# Patient Record
Sex: MALE | Race: OTHER | NOT HISPANIC OR LATINO | URBAN - METROPOLITAN AREA
[De-identification: names, ages, dates, MRNs, and addresses within clinical notes are randomized per-mention and may not be internally consistent; named-entity substitution may affect disease eponyms.]

---

## 2024-01-01 ENCOUNTER — INPATIENT (INPATIENT)
Facility: HOSPITAL | Age: 79
LOS: 19 days | DRG: 392 | End: 2024-10-06
Attending: HOSPITALIST | Admitting: STUDENT IN AN ORGANIZED HEALTH CARE EDUCATION/TRAINING PROGRAM
Payer: MEDICARE

## 2024-01-01 VITALS
TEMPERATURE: 98 F | SYSTOLIC BLOOD PRESSURE: 92 MMHG | WEIGHT: 126.99 LBS | RESPIRATION RATE: 18 BRPM | HEART RATE: 93 BPM | DIASTOLIC BLOOD PRESSURE: 55 MMHG | OXYGEN SATURATION: 96 %

## 2024-01-01 VITALS — OXYGEN SATURATION: 97 % | HEART RATE: 62 BPM

## 2024-01-01 DIAGNOSIS — Z51.5 ENCOUNTER FOR PALLIATIVE CARE: ICD-10-CM

## 2024-01-01 DIAGNOSIS — R10.9 UNSPECIFIED ABDOMINAL PAIN: ICD-10-CM

## 2024-01-01 DIAGNOSIS — C67.9 MALIGNANT NEOPLASM OF BLADDER, UNSPECIFIED: ICD-10-CM

## 2024-01-01 DIAGNOSIS — R31.9 HEMATURIA, UNSPECIFIED: ICD-10-CM

## 2024-01-01 LAB
A1C WITH ESTIMATED AVERAGE GLUCOSE RESULT: 6.3 % — HIGH (ref 4–5.6)
AFP-TM SERPL-MCNC: 2.4 NG/ML — SIGNIFICANT CHANGE UP
ALBUMIN SERPL ELPH-MCNC: 2.2 G/DL — LOW (ref 3.5–5.2)
ALBUMIN SERPL ELPH-MCNC: 2.3 G/DL — LOW (ref 3.5–5.2)
ALBUMIN SERPL ELPH-MCNC: 2.5 G/DL — LOW (ref 3.5–5.2)
ALBUMIN SERPL ELPH-MCNC: 2.6 G/DL — LOW (ref 3.5–5.2)
ALBUMIN SERPL ELPH-MCNC: 2.6 G/DL — LOW (ref 3.5–5.2)
ALBUMIN SERPL ELPH-MCNC: 2.8 G/DL — LOW (ref 3.5–5.2)
ALBUMIN SERPL ELPH-MCNC: 2.9 G/DL — LOW (ref 3.5–5.2)
ALBUMIN SERPL ELPH-MCNC: 3 G/DL — LOW (ref 3.5–5.2)
ALBUMIN SERPL ELPH-MCNC: 3 G/DL — LOW (ref 3.5–5.2)
ALBUMIN SERPL ELPH-MCNC: 3.1 G/DL — LOW (ref 3.5–5.2)
ALBUMIN SERPL ELPH-MCNC: 3.3 G/DL — LOW (ref 3.5–5.2)
ALBUMIN SERPL ELPH-MCNC: 3.4 G/DL — LOW (ref 3.5–5.2)
ALBUMIN SERPL ELPH-MCNC: 3.4 G/DL — LOW (ref 3.5–5.2)
ALBUMIN SERPL ELPH-MCNC: 3.7 G/DL — SIGNIFICANT CHANGE UP (ref 3.5–5.2)
ALBUMIN SERPL ELPH-MCNC: 3.8 G/DL — SIGNIFICANT CHANGE UP (ref 3.5–5.2)
ALP SERPL-CCNC: 485 U/L — HIGH (ref 30–115)
ALP SERPL-CCNC: 485 U/L — HIGH (ref 30–115)
ALP SERPL-CCNC: 542 U/L — HIGH (ref 30–115)
ALP SERPL-CCNC: 596 U/L — HIGH (ref 30–115)
ALP SERPL-CCNC: 655 U/L — HIGH (ref 30–115)
ALP SERPL-CCNC: 812 U/L — HIGH (ref 30–115)
ALP SERPL-CCNC: 850 U/L — HIGH (ref 30–115)
ALP SERPL-CCNC: 853 U/L — HIGH (ref 30–115)
ALP SERPL-CCNC: 856 U/L — HIGH (ref 30–115)
ALP SERPL-CCNC: 860 U/L — HIGH (ref 30–115)
ALP SERPL-CCNC: 865 U/L — HIGH (ref 30–115)
ALP SERPL-CCNC: 869 U/L — HIGH (ref 30–115)
ALP SERPL-CCNC: 874 U/L — HIGH (ref 30–115)
ALP SERPL-CCNC: 889 U/L — HIGH (ref 30–115)
ALP SERPL-CCNC: 897 U/L — HIGH (ref 30–115)
ALP SERPL-CCNC: 899 U/L — HIGH (ref 30–115)
ALP SERPL-CCNC: 908 U/L — HIGH (ref 30–115)
ALP SERPL-CCNC: 947 U/L — HIGH (ref 30–115)
ALP SERPL-CCNC: 969 U/L — HIGH (ref 30–115)
ALP SERPL-CCNC: 981 U/L — HIGH (ref 30–115)
ALT FLD-CCNC: 60 U/L — HIGH (ref 0–41)
ALT FLD-CCNC: 63 U/L — HIGH (ref 0–41)
ALT FLD-CCNC: 64 U/L — HIGH (ref 0–41)
ALT FLD-CCNC: 65 U/L — HIGH (ref 0–41)
ALT FLD-CCNC: 65 U/L — HIGH (ref 0–41)
ALT FLD-CCNC: 69 U/L — HIGH (ref 0–41)
ALT FLD-CCNC: 69 U/L — HIGH (ref 0–41)
ALT FLD-CCNC: 70 U/L — HIGH (ref 0–41)
ALT FLD-CCNC: 70 U/L — HIGH (ref 0–41)
ALT FLD-CCNC: 72 U/L — HIGH (ref 0–41)
ALT FLD-CCNC: 81 U/L — HIGH (ref 0–41)
ALT FLD-CCNC: 81 U/L — HIGH (ref 0–41)
ALT FLD-CCNC: 84 U/L — HIGH (ref 0–41)
ALT FLD-CCNC: 85 U/L — HIGH (ref 0–41)
ALT FLD-CCNC: 86 U/L — HIGH (ref 0–41)
ALT FLD-CCNC: 89 U/L — HIGH (ref 0–41)
ALT FLD-CCNC: 91 U/L — HIGH (ref 0–41)
ALT FLD-CCNC: 91 U/L — HIGH (ref 0–41)
ALT FLD-CCNC: 92 U/L — HIGH (ref 0–41)
ALT FLD-CCNC: 98 U/L — HIGH (ref 0–41)
ANA TITR SER: NEGATIVE — SIGNIFICANT CHANGE UP
ANION GAP SERPL CALC-SCNC: 14 MMOL/L — SIGNIFICANT CHANGE UP (ref 7–14)
ANION GAP SERPL CALC-SCNC: 15 MMOL/L — HIGH (ref 7–14)
ANION GAP SERPL CALC-SCNC: 16 MMOL/L — HIGH (ref 7–14)
ANION GAP SERPL CALC-SCNC: 16 MMOL/L — HIGH (ref 7–14)
ANION GAP SERPL CALC-SCNC: 17 MMOL/L — HIGH (ref 7–14)
ANION GAP SERPL CALC-SCNC: 18 MMOL/L — HIGH (ref 7–14)
ANION GAP SERPL CALC-SCNC: 19 MMOL/L — HIGH (ref 7–14)
ANION GAP SERPL CALC-SCNC: 21 MMOL/L — HIGH (ref 7–14)
ANION GAP SERPL CALC-SCNC: 21 MMOL/L — HIGH (ref 7–14)
ANION GAP SERPL CALC-SCNC: 22 MMOL/L — HIGH (ref 7–14)
ANION GAP SERPL CALC-SCNC: 27 MMOL/L — HIGH (ref 7–14)
APPEARANCE UR: ABNORMAL
APTT BLD: 29.9 SEC — SIGNIFICANT CHANGE UP (ref 27–39.2)
APTT BLD: 30.6 SEC — SIGNIFICANT CHANGE UP (ref 27–39.2)
APTT BLD: 31.9 SEC — SIGNIFICANT CHANGE UP (ref 27–39.2)
AST SERPL-CCNC: 245 U/L — HIGH (ref 0–41)
AST SERPL-CCNC: 252 U/L — HIGH (ref 0–41)
AST SERPL-CCNC: 252 U/L — HIGH (ref 0–41)
AST SERPL-CCNC: 259 U/L — HIGH (ref 0–41)
AST SERPL-CCNC: 260 U/L — HIGH (ref 0–41)
AST SERPL-CCNC: 270 U/L — HIGH (ref 0–41)
AST SERPL-CCNC: 278 U/L — HIGH (ref 0–41)
AST SERPL-CCNC: 283 U/L — HIGH (ref 0–41)
AST SERPL-CCNC: 289 U/L — HIGH (ref 0–41)
AST SERPL-CCNC: 292 U/L — HIGH (ref 0–41)
AST SERPL-CCNC: 294 U/L — HIGH (ref 0–41)
AST SERPL-CCNC: 294 U/L — HIGH (ref 0–41)
AST SERPL-CCNC: 301 U/L — HIGH (ref 0–41)
AST SERPL-CCNC: 304 U/L — HIGH (ref 0–41)
AST SERPL-CCNC: 323 U/L — HIGH (ref 0–41)
AST SERPL-CCNC: 331 U/L — HIGH (ref 0–41)
AST SERPL-CCNC: 335 U/L — HIGH (ref 0–41)
AST SERPL-CCNC: 339 U/L — HIGH (ref 0–41)
AST SERPL-CCNC: 412 U/L — HIGH (ref 0–41)
AST SERPL-CCNC: 414 U/L — HIGH (ref 0–41)
BACTERIA # UR AUTO: ABNORMAL /HPF
BACTERIA # UR AUTO: ABNORMAL /HPF
BASE EXCESS BLDA CALC-SCNC: -8.7 MMOL/L — LOW (ref -2–3)
BASOPHILS # BLD AUTO: 0 K/UL — SIGNIFICANT CHANGE UP (ref 0–0.2)
BASOPHILS # BLD AUTO: 0 K/UL — SIGNIFICANT CHANGE UP (ref 0–0.2)
BASOPHILS # BLD AUTO: 0.01 K/UL — SIGNIFICANT CHANGE UP (ref 0–0.2)
BASOPHILS # BLD AUTO: 0.02 K/UL — SIGNIFICANT CHANGE UP (ref 0–0.2)
BASOPHILS # BLD AUTO: 0.03 K/UL — SIGNIFICANT CHANGE UP (ref 0–0.2)
BASOPHILS # BLD AUTO: 0.04 K/UL — SIGNIFICANT CHANGE UP (ref 0–0.2)
BASOPHILS NFR BLD AUTO: 0 % — SIGNIFICANT CHANGE UP (ref 0–1)
BASOPHILS NFR BLD AUTO: 0 % — SIGNIFICANT CHANGE UP (ref 0–1)
BASOPHILS NFR BLD AUTO: 0.1 % — SIGNIFICANT CHANGE UP (ref 0–1)
BASOPHILS NFR BLD AUTO: 0.2 % — SIGNIFICANT CHANGE UP (ref 0–1)
BASOPHILS NFR BLD AUTO: 0.3 % — SIGNIFICANT CHANGE UP (ref 0–1)
BASOPHILS NFR BLD AUTO: 0.4 % — SIGNIFICANT CHANGE UP (ref 0–1)
BILIRUB SERPL-MCNC: 2.1 MG/DL — HIGH (ref 0.2–1.2)
BILIRUB SERPL-MCNC: 2.6 MG/DL — HIGH (ref 0.2–1.2)
BILIRUB SERPL-MCNC: 3.1 MG/DL — HIGH (ref 0.2–1.2)
BILIRUB SERPL-MCNC: 3.8 MG/DL — HIGH (ref 0.2–1.2)
BILIRUB SERPL-MCNC: 3.8 MG/DL — HIGH (ref 0.2–1.2)
BILIRUB SERPL-MCNC: 4 MG/DL — HIGH (ref 0.2–1.2)
BILIRUB SERPL-MCNC: 4.1 MG/DL — HIGH (ref 0.2–1.2)
BILIRUB SERPL-MCNC: 4.1 MG/DL — HIGH (ref 0.2–1.2)
BILIRUB SERPL-MCNC: 4.2 MG/DL — HIGH (ref 0.2–1.2)
BILIRUB SERPL-MCNC: 4.3 MG/DL — HIGH (ref 0.2–1.2)
BILIRUB SERPL-MCNC: 5.5 MG/DL — HIGH (ref 0.2–1.2)
BILIRUB SERPL-MCNC: 5.5 MG/DL — HIGH (ref 0.2–1.2)
BILIRUB SERPL-MCNC: 5.8 MG/DL — HIGH (ref 0.2–1.2)
BILIRUB SERPL-MCNC: 6.6 MG/DL — HIGH (ref 0.2–1.2)
BILIRUB SERPL-MCNC: 6.7 MG/DL — HIGH (ref 0.2–1.2)
BILIRUB SERPL-MCNC: 7 MG/DL — HIGH (ref 0.2–1.2)
BILIRUB SERPL-MCNC: 7.5 MG/DL — HIGH (ref 0.2–1.2)
BILIRUB SERPL-MCNC: 8.1 MG/DL — HIGH (ref 0.2–1.2)
BILIRUB SERPL-MCNC: 8.2 MG/DL — HIGH (ref 0.2–1.2)
BILIRUB SERPL-MCNC: 8.4 MG/DL — HIGH (ref 0.2–1.2)
BILIRUB UR-MCNC: ABNORMAL
BLD GP AB SCN SERPL QL: SIGNIFICANT CHANGE UP
BUN SERPL-MCNC: 17 MG/DL — SIGNIFICANT CHANGE UP (ref 10–20)
BUN SERPL-MCNC: 19 MG/DL — SIGNIFICANT CHANGE UP (ref 10–20)
BUN SERPL-MCNC: 21 MG/DL — HIGH (ref 10–20)
BUN SERPL-MCNC: 23 MG/DL — HIGH (ref 10–20)
BUN SERPL-MCNC: 24 MG/DL — HIGH (ref 10–20)
BUN SERPL-MCNC: 24 MG/DL — HIGH (ref 10–20)
BUN SERPL-MCNC: 26 MG/DL — HIGH (ref 10–20)
BUN SERPL-MCNC: 26 MG/DL — HIGH (ref 10–20)
BUN SERPL-MCNC: 27 MG/DL — HIGH (ref 10–20)
BUN SERPL-MCNC: 27 MG/DL — HIGH (ref 10–20)
BUN SERPL-MCNC: 28 MG/DL — HIGH (ref 10–20)
BUN SERPL-MCNC: 29 MG/DL — HIGH (ref 10–20)
BUN SERPL-MCNC: 30 MG/DL — HIGH (ref 10–20)
BUN SERPL-MCNC: 30 MG/DL — HIGH (ref 10–20)
BUN SERPL-MCNC: 31 MG/DL — HIGH (ref 10–20)
BUN SERPL-MCNC: 31 MG/DL — HIGH (ref 10–20)
BUN SERPL-MCNC: 32 MG/DL — HIGH (ref 10–20)
BUN SERPL-MCNC: 32 MG/DL — HIGH (ref 10–20)
BUN SERPL-MCNC: 43 MG/DL — HIGH (ref 10–20)
BUN SERPL-MCNC: 44 MG/DL — HIGH (ref 10–20)
BUN SERPL-MCNC: 44 MG/DL — HIGH (ref 10–20)
BUN SERPL-MCNC: 48 MG/DL — HIGH (ref 10–20)
BUN SERPL-MCNC: 53 MG/DL — HIGH (ref 10–20)
C DIFF GDH STL QL: NEGATIVE — SIGNIFICANT CHANGE UP
C DIFF GDH STL QL: SIGNIFICANT CHANGE UP
CALCIUM SERPL-MCNC: 10.2 MG/DL — SIGNIFICANT CHANGE UP (ref 8.4–10.5)
CALCIUM SERPL-MCNC: 11.4 MG/DL — HIGH (ref 8.4–10.5)
CALCIUM SERPL-MCNC: 7.6 MG/DL — LOW (ref 8.4–10.5)
CALCIUM SERPL-MCNC: 8 MG/DL — LOW (ref 8.4–10.5)
CALCIUM SERPL-MCNC: 8.1 MG/DL — LOW (ref 8.4–10.5)
CALCIUM SERPL-MCNC: 8.2 MG/DL — LOW (ref 8.4–10.5)
CALCIUM SERPL-MCNC: 8.4 MG/DL — SIGNIFICANT CHANGE UP (ref 8.4–10.4)
CALCIUM SERPL-MCNC: 8.4 MG/DL — SIGNIFICANT CHANGE UP (ref 8.4–10.5)
CALCIUM SERPL-MCNC: 8.4 MG/DL — SIGNIFICANT CHANGE UP (ref 8.4–10.5)
CALCIUM SERPL-MCNC: 8.6 MG/DL — SIGNIFICANT CHANGE UP (ref 8.4–10.4)
CALCIUM SERPL-MCNC: 8.6 MG/DL — SIGNIFICANT CHANGE UP (ref 8.4–10.5)
CALCIUM SERPL-MCNC: 9.3 MG/DL — SIGNIFICANT CHANGE UP (ref 8.4–10.4)
CALCIUM SERPL-MCNC: 9.3 MG/DL — SIGNIFICANT CHANGE UP (ref 8.4–10.5)
CALCIUM SERPL-MCNC: 9.4 MG/DL — SIGNIFICANT CHANGE UP (ref 8.4–10.4)
CALCIUM SERPL-MCNC: 9.4 MG/DL — SIGNIFICANT CHANGE UP (ref 8.4–10.4)
CALCIUM SERPL-MCNC: 9.4 MG/DL — SIGNIFICANT CHANGE UP (ref 8.4–10.5)
CALCIUM SERPL-MCNC: 9.5 MG/DL — SIGNIFICANT CHANGE UP (ref 8.4–10.5)
CALCIUM SERPL-MCNC: 9.6 MG/DL — SIGNIFICANT CHANGE UP (ref 8.4–10.4)
CALCIUM SERPL-MCNC: 9.6 MG/DL — SIGNIFICANT CHANGE UP (ref 8.4–10.5)
CALCIUM SERPL-MCNC: 9.8 MG/DL — SIGNIFICANT CHANGE UP (ref 8.4–10.5)
CALCIUM SERPL-MCNC: 9.9 MG/DL — SIGNIFICANT CHANGE UP (ref 8.4–10.5)
CALPROTECTIN STL-MCNT: 42 UG/G — SIGNIFICANT CHANGE UP (ref 0–120)
CANCER AG19-9 SERPL-ACNC: 49 U/ML — HIGH
CAST: 40 /LPF — HIGH (ref 0–4)
CAST: 9 /LPF — HIGH (ref 0–4)
CHLORIDE SERPL-SCNC: 100 MMOL/L — SIGNIFICANT CHANGE UP (ref 98–110)
CHLORIDE SERPL-SCNC: 100 MMOL/L — SIGNIFICANT CHANGE UP (ref 98–110)
CHLORIDE SERPL-SCNC: 102 MMOL/L — SIGNIFICANT CHANGE UP (ref 98–110)
CHLORIDE SERPL-SCNC: 103 MMOL/L — SIGNIFICANT CHANGE UP (ref 98–110)
CHLORIDE SERPL-SCNC: 104 MMOL/L — SIGNIFICANT CHANGE UP (ref 98–110)
CHLORIDE SERPL-SCNC: 105 MMOL/L — SIGNIFICANT CHANGE UP (ref 98–110)
CHLORIDE SERPL-SCNC: 105 MMOL/L — SIGNIFICANT CHANGE UP (ref 98–110)
CHLORIDE SERPL-SCNC: 106 MMOL/L — SIGNIFICANT CHANGE UP (ref 98–110)
CHLORIDE SERPL-SCNC: 107 MMOL/L — SIGNIFICANT CHANGE UP (ref 98–110)
CHLORIDE SERPL-SCNC: 107 MMOL/L — SIGNIFICANT CHANGE UP (ref 98–110)
CHLORIDE SERPL-SCNC: 92 MMOL/L — LOW (ref 98–110)
CHLORIDE SERPL-SCNC: 95 MMOL/L — LOW (ref 98–110)
CHLORIDE SERPL-SCNC: 96 MMOL/L — LOW (ref 98–110)
CHLORIDE SERPL-SCNC: 97 MMOL/L — LOW (ref 98–110)
CHLORIDE SERPL-SCNC: 97 MMOL/L — LOW (ref 98–110)
CHLORIDE SERPL-SCNC: 98 MMOL/L — SIGNIFICANT CHANGE UP (ref 98–110)
CHLORIDE SERPL-SCNC: 98 MMOL/L — SIGNIFICANT CHANGE UP (ref 98–110)
CHLORIDE SERPL-SCNC: 99 MMOL/L — SIGNIFICANT CHANGE UP (ref 98–110)
CHOLEST SERPL-MCNC: 211 MG/DL — HIGH
CO2 SERPL-SCNC: 16 MMOL/L — LOW (ref 17–32)
CO2 SERPL-SCNC: 17 MMOL/L — SIGNIFICANT CHANGE UP (ref 17–32)
CO2 SERPL-SCNC: 18 MMOL/L — SIGNIFICANT CHANGE UP (ref 17–32)
CO2 SERPL-SCNC: 19 MMOL/L — SIGNIFICANT CHANGE UP (ref 17–32)
CO2 SERPL-SCNC: 20 MMOL/L — SIGNIFICANT CHANGE UP (ref 17–32)
CO2 SERPL-SCNC: 21 MMOL/L — SIGNIFICANT CHANGE UP (ref 17–32)
CO2 SERPL-SCNC: 21 MMOL/L — SIGNIFICANT CHANGE UP (ref 17–32)
CO2 SERPL-SCNC: 22 MMOL/L — SIGNIFICANT CHANGE UP (ref 17–32)
CO2 SERPL-SCNC: 23 MMOL/L — SIGNIFICANT CHANGE UP (ref 17–32)
CO2 SERPL-SCNC: 23 MMOL/L — SIGNIFICANT CHANGE UP (ref 17–32)
CO2 SERPL-SCNC: 24 MMOL/L — SIGNIFICANT CHANGE UP (ref 17–32)
CO2 SERPL-SCNC: 25 MMOL/L — SIGNIFICANT CHANGE UP (ref 17–32)
CO2 SERPL-SCNC: 25 MMOL/L — SIGNIFICANT CHANGE UP (ref 17–32)
COLOR SPEC: ABNORMAL
COLOR SPEC: SIGNIFICANT CHANGE UP
COLOR SPEC: SIGNIFICANT CHANGE UP
CREAT SERPL-MCNC: 0.7 MG/DL — SIGNIFICANT CHANGE UP (ref 0.7–1.5)
CREAT SERPL-MCNC: 0.7 MG/DL — SIGNIFICANT CHANGE UP (ref 0.7–1.5)
CREAT SERPL-MCNC: 0.8 MG/DL — SIGNIFICANT CHANGE UP (ref 0.7–1.5)
CREAT SERPL-MCNC: 0.9 MG/DL — SIGNIFICANT CHANGE UP (ref 0.7–1.5)
CREAT SERPL-MCNC: 1 MG/DL — SIGNIFICANT CHANGE UP (ref 0.7–1.5)
CREAT SERPL-MCNC: 1.1 MG/DL — SIGNIFICANT CHANGE UP (ref 0.7–1.5)
CREAT SERPL-MCNC: 1.1 MG/DL — SIGNIFICANT CHANGE UP (ref 0.7–1.5)
CREAT SERPL-MCNC: 1.2 MG/DL — SIGNIFICANT CHANGE UP (ref 0.7–1.5)
CREAT SERPL-MCNC: 1.3 MG/DL — SIGNIFICANT CHANGE UP (ref 0.7–1.5)
CREAT SERPL-MCNC: 1.9 MG/DL — HIGH (ref 0.7–1.5)
CREAT SERPL-MCNC: 2.2 MG/DL — HIGH (ref 0.7–1.5)
CREAT SERPL-MCNC: 2.4 MG/DL — HIGH (ref 0.7–1.5)
CREAT SERPL-MCNC: 2.4 MG/DL — HIGH (ref 0.7–1.5)
CULTURE RESULTS: SIGNIFICANT CHANGE UP
D DIMER BLD IA.RAPID-MCNC: 1119 NG/ML DDU — HIGH
DIFF PNL FLD: ABNORMAL
EGFR: 27 ML/MIN/1.73M2 — LOW
EGFR: 27 ML/MIN/1.73M2 — LOW
EGFR: 30 ML/MIN/1.73M2 — LOW
EGFR: 36 ML/MIN/1.73M2 — LOW
EGFR: 57 ML/MIN/1.73M2 — LOW
EGFR: 62 ML/MIN/1.73M2 — SIGNIFICANT CHANGE UP
EGFR: 69 ML/MIN/1.73M2 — SIGNIFICANT CHANGE UP
EGFR: 69 ML/MIN/1.73M2 — SIGNIFICANT CHANGE UP
EGFR: 77 ML/MIN/1.73M2 — SIGNIFICANT CHANGE UP
EGFR: 87 ML/MIN/1.73M2 — SIGNIFICANT CHANGE UP
EGFR: 91 ML/MIN/1.73M2 — SIGNIFICANT CHANGE UP
EGFR: 94 ML/MIN/1.73M2 — SIGNIFICANT CHANGE UP
EGFR: 94 ML/MIN/1.73M2 — SIGNIFICANT CHANGE UP
ELASTASE PANC STL-MCNT: 157 CD:794062645 — LOW
EOSINOPHIL # BLD AUTO: 0 K/UL — SIGNIFICANT CHANGE UP (ref 0–0.7)
EOSINOPHIL # BLD AUTO: 0.01 K/UL — SIGNIFICANT CHANGE UP (ref 0–0.7)
EOSINOPHIL # BLD AUTO: 0.02 K/UL — SIGNIFICANT CHANGE UP (ref 0–0.7)
EOSINOPHIL # BLD AUTO: 0.03 K/UL — SIGNIFICANT CHANGE UP (ref 0–0.7)
EOSINOPHIL # BLD AUTO: 0.04 K/UL — SIGNIFICANT CHANGE UP (ref 0–0.7)
EOSINOPHIL # BLD AUTO: 0.05 K/UL — SIGNIFICANT CHANGE UP (ref 0–0.7)
EOSINOPHIL # BLD AUTO: 0.09 K/UL — SIGNIFICANT CHANGE UP (ref 0–0.7)
EOSINOPHIL NFR BLD AUTO: 0 % — SIGNIFICANT CHANGE UP (ref 0–8)
EOSINOPHIL NFR BLD AUTO: 0.1 % — SIGNIFICANT CHANGE UP (ref 0–8)
EOSINOPHIL NFR BLD AUTO: 0.3 % — SIGNIFICANT CHANGE UP (ref 0–8)
EOSINOPHIL NFR BLD AUTO: 0.4 % — SIGNIFICANT CHANGE UP (ref 0–8)
EOSINOPHIL NFR BLD AUTO: 0.8 % — SIGNIFICANT CHANGE UP (ref 0–8)
ESTIMATED AVERAGE GLUCOSE: 134 MG/DL — HIGH (ref 68–114)
FAT STL QN: NORMAL — SIGNIFICANT CHANGE UP
FAT STL QN: NORMAL — SIGNIFICANT CHANGE UP
GASTRIN SERPL-MCNC: 53 PG/ML — SIGNIFICANT CHANGE UP (ref 0–115)
GI PCR PANEL: SIGNIFICANT CHANGE UP
GLUCOSE BLDC GLUCOMTR-MCNC: 239 MG/DL — HIGH (ref 70–99)
GLUCOSE SERPL-MCNC: 101 MG/DL — HIGH (ref 70–99)
GLUCOSE SERPL-MCNC: 102 MG/DL — HIGH (ref 70–99)
GLUCOSE SERPL-MCNC: 107 MG/DL — HIGH (ref 70–99)
GLUCOSE SERPL-MCNC: 108 MG/DL — HIGH (ref 70–99)
GLUCOSE SERPL-MCNC: 110 MG/DL — HIGH (ref 70–99)
GLUCOSE SERPL-MCNC: 122 MG/DL — HIGH (ref 70–99)
GLUCOSE SERPL-MCNC: 122 MG/DL — HIGH (ref 70–99)
GLUCOSE SERPL-MCNC: 132 MG/DL — HIGH (ref 70–99)
GLUCOSE SERPL-MCNC: 137 MG/DL — HIGH (ref 70–99)
GLUCOSE SERPL-MCNC: 142 MG/DL — HIGH (ref 70–99)
GLUCOSE SERPL-MCNC: 149 MG/DL — HIGH (ref 70–99)
GLUCOSE SERPL-MCNC: 207 MG/DL — HIGH (ref 70–99)
GLUCOSE SERPL-MCNC: 59 MG/DL — LOW (ref 70–99)
GLUCOSE SERPL-MCNC: 76 MG/DL — SIGNIFICANT CHANGE UP (ref 70–99)
GLUCOSE SERPL-MCNC: 78 MG/DL — SIGNIFICANT CHANGE UP (ref 70–99)
GLUCOSE SERPL-MCNC: 78 MG/DL — SIGNIFICANT CHANGE UP (ref 70–99)
GLUCOSE SERPL-MCNC: 79 MG/DL — SIGNIFICANT CHANGE UP (ref 70–99)
GLUCOSE SERPL-MCNC: 80 MG/DL — SIGNIFICANT CHANGE UP (ref 70–99)
GLUCOSE SERPL-MCNC: 84 MG/DL — SIGNIFICANT CHANGE UP (ref 70–99)
GLUCOSE SERPL-MCNC: 84 MG/DL — SIGNIFICANT CHANGE UP (ref 70–99)
GLUCOSE SERPL-MCNC: 85 MG/DL — SIGNIFICANT CHANGE UP (ref 70–99)
GLUCOSE SERPL-MCNC: 86 MG/DL — SIGNIFICANT CHANGE UP (ref 70–99)
GLUCOSE SERPL-MCNC: 87 MG/DL — SIGNIFICANT CHANGE UP (ref 70–99)
GLUCOSE UR QL: NEGATIVE MG/DL — SIGNIFICANT CHANGE UP
HAV IGM SER-ACNC: SIGNIFICANT CHANGE UP
HBV CORE IGM SER-ACNC: SIGNIFICANT CHANGE UP
HBV SURFACE AG SER-ACNC: SIGNIFICANT CHANGE UP
HCO3 BLDA-SCNC: 21 MMOL/L — SIGNIFICANT CHANGE UP (ref 21–28)
HCT VFR BLD CALC: 22.5 % — LOW (ref 42–52)
HCT VFR BLD CALC: 25.3 % — LOW (ref 42–52)
HCT VFR BLD CALC: 26.2 % — LOW (ref 42–52)
HCT VFR BLD CALC: 26.3 % — LOW (ref 42–52)
HCT VFR BLD CALC: 26.4 % — LOW (ref 42–52)
HCT VFR BLD CALC: 26.7 % — LOW (ref 42–52)
HCT VFR BLD CALC: 27.9 % — LOW (ref 42–52)
HCT VFR BLD CALC: 28 % — LOW (ref 42–52)
HCT VFR BLD CALC: 28.5 % — LOW (ref 42–52)
HCT VFR BLD CALC: 29.4 % — LOW (ref 42–52)
HCT VFR BLD CALC: 29.4 % — LOW (ref 42–52)
HCT VFR BLD CALC: 29.9 % — LOW (ref 42–52)
HCT VFR BLD CALC: 31.1 % — LOW (ref 42–52)
HCT VFR BLD CALC: 31.4 % — LOW (ref 42–52)
HCT VFR BLD CALC: 31.9 % — LOW (ref 42–52)
HCT VFR BLD CALC: 32.4 % — LOW (ref 42–52)
HCT VFR BLD CALC: 33.3 % — LOW (ref 42–52)
HCT VFR BLD CALC: 34 % — LOW (ref 42–52)
HCT VFR BLD CALC: 34.3 % — LOW (ref 42–52)
HCT VFR BLD CALC: 35.3 % — LOW (ref 42–52)
HCT VFR BLD CALC: 35.4 % — LOW (ref 42–52)
HCT VFR BLD CALC: 35.7 % — LOW (ref 42–52)
HCT VFR BLD CALC: 39.5 % — LOW (ref 42–52)
HCV AB S/CO SERPL IA: 0.08 S/CO — SIGNIFICANT CHANGE UP (ref 0–0.99)
HCV AB SERPL-IMP: SIGNIFICANT CHANGE UP
HDLC SERPL-MCNC: 42 MG/DL — SIGNIFICANT CHANGE UP
HGB BLD-MCNC: 10 G/DL — LOW (ref 14–18)
HGB BLD-MCNC: 10.1 G/DL — LOW (ref 14–18)
HGB BLD-MCNC: 10.5 G/DL — LOW (ref 14–18)
HGB BLD-MCNC: 10.5 G/DL — LOW (ref 14–18)
HGB BLD-MCNC: 10.6 G/DL — LOW (ref 14–18)
HGB BLD-MCNC: 11.2 G/DL — LOW (ref 14–18)
HGB BLD-MCNC: 11.2 G/DL — LOW (ref 14–18)
HGB BLD-MCNC: 11.4 G/DL — LOW (ref 14–18)
HGB BLD-MCNC: 11.7 G/DL — LOW (ref 14–18)
HGB BLD-MCNC: 11.9 G/DL — LOW (ref 14–18)
HGB BLD-MCNC: 12.2 G/DL — LOW (ref 14–18)
HGB BLD-MCNC: 12.3 G/DL — LOW (ref 14–18)
HGB BLD-MCNC: 12.3 G/DL — LOW (ref 14–18)
HGB BLD-MCNC: 12.4 G/DL — LOW (ref 14–18)
HGB BLD-MCNC: 12.7 G/DL — LOW (ref 14–18)
HGB BLD-MCNC: 13.7 G/DL — LOW (ref 14–18)
HGB BLD-MCNC: 8.1 G/DL — LOW (ref 14–18)
HGB BLD-MCNC: 9.2 G/DL — LOW (ref 14–18)
HGB BLD-MCNC: 9.4 G/DL — LOW (ref 14–18)
HGB BLD-MCNC: 9.4 G/DL — LOW (ref 14–18)
HGB BLD-MCNC: 9.5 G/DL — LOW (ref 14–18)
HGB BLD-MCNC: 9.5 G/DL — LOW (ref 14–18)
HGB BLD-MCNC: 9.6 G/DL — LOW (ref 14–18)
HGB BLD-MCNC: 9.6 G/DL — LOW (ref 14–18)
HGB BLD-MCNC: 9.8 G/DL — LOW (ref 14–18)
HIV 1+2 AB+HIV1 P24 AG SERPL QL IA: SIGNIFICANT CHANGE UP
IGA FLD-MCNC: 153 MG/DL — SIGNIFICANT CHANGE UP (ref 84–499)
IGG FLD-MCNC: 701 MG/DL — SIGNIFICANT CHANGE UP (ref 610–1660)
IGM SERPL-MCNC: 27 MG/DL — LOW (ref 35–242)
IMM GRANULOCYTES NFR BLD AUTO: 0.5 % — HIGH (ref 0.1–0.3)
IMM GRANULOCYTES NFR BLD AUTO: 0.6 % — HIGH (ref 0.1–0.3)
IMM GRANULOCYTES NFR BLD AUTO: 0.7 % — HIGH (ref 0.1–0.3)
IMM GRANULOCYTES NFR BLD AUTO: 0.8 % — HIGH (ref 0.1–0.3)
IMM GRANULOCYTES NFR BLD AUTO: 0.8 % — HIGH (ref 0.1–0.3)
IMM GRANULOCYTES NFR BLD AUTO: 0.9 % — HIGH (ref 0.1–0.3)
IMM GRANULOCYTES NFR BLD AUTO: 1 % — HIGH (ref 0.1–0.3)
IMM GRANULOCYTES NFR BLD AUTO: 1 % — HIGH (ref 0.1–0.3)
IMM GRANULOCYTES NFR BLD AUTO: 1.1 % — HIGH (ref 0.1–0.3)
IMM GRANULOCYTES NFR BLD AUTO: 1.2 % — HIGH (ref 0.1–0.3)
IMM GRANULOCYTES NFR BLD AUTO: 1.2 % — HIGH (ref 0.1–0.3)
IMM GRANULOCYTES NFR BLD AUTO: 1.4 % — HIGH (ref 0.1–0.3)
IMM GRANULOCYTES NFR BLD AUTO: 1.6 % — HIGH (ref 0.1–0.3)
IMM GRANULOCYTES NFR BLD AUTO: 1.7 % — HIGH (ref 0.1–0.3)
IMM GRANULOCYTES NFR BLD AUTO: 1.7 % — HIGH (ref 0.1–0.3)
IMM GRANULOCYTES NFR BLD AUTO: 1.8 % — HIGH (ref 0.1–0.3)
INR BLD: 1.06 RATIO — SIGNIFICANT CHANGE UP (ref 0.65–1.3)
INR BLD: 1.17 RATIO — SIGNIFICANT CHANGE UP (ref 0.65–1.3)
KAPPA LC SER QL IFE: 2.13 MG/DL — HIGH (ref 0.33–1.94)
KAPPA/LAMBDA FREE LIGHT CHAIN RATIO, SERUM: 2.2 RATIO — HIGH (ref 0.26–1.65)
KETONES UR-MCNC: 15 MG/DL
KETONES UR-MCNC: 15 MG/DL
KETONES UR-MCNC: NEGATIVE MG/DL — SIGNIFICANT CHANGE UP
LACTATE SERPL-SCNC: 1.9 MMOL/L — SIGNIFICANT CHANGE UP (ref 0.7–2)
LACTATE SERPL-SCNC: 1.9 MMOL/L — SIGNIFICANT CHANGE UP (ref 0.7–2)
LACTATE SERPL-SCNC: 2.2 MMOL/L — HIGH (ref 0.7–2)
LACTATE SERPL-SCNC: 3.5 MMOL/L — HIGH (ref 0.7–2)
LACTATE SERPL-SCNC: 4.4 MMOL/L — CRITICAL HIGH (ref 0.7–2)
LACTOFERRIN STL-MCNC: 1.83 CD:794062635 — SIGNIFICANT CHANGE UP (ref 0–7.24)
LAMBDA LC SER QL IFE: 0.97 MG/DL — SIGNIFICANT CHANGE UP (ref 0.57–2.63)
LEUKOCYTE ESTERASE UR-ACNC: ABNORMAL
LIDOCAIN IGE QN: 81 U/L — HIGH (ref 7–60)
LIPID PNL WITH DIRECT LDL SERPL: 134 MG/DL — HIGH
LKM AB SER-ACNC: <20.1 UNITS — SIGNIFICANT CHANGE UP (ref 0–20)
LYMPHOCYTES # BLD AUTO: 0.14 K/UL — LOW (ref 1.2–3.4)
LYMPHOCYTES # BLD AUTO: 0.19 K/UL — LOW (ref 1.2–3.4)
LYMPHOCYTES # BLD AUTO: 0.35 K/UL — LOW (ref 1.2–3.4)
LYMPHOCYTES # BLD AUTO: 0.41 K/UL — LOW (ref 1.2–3.4)
LYMPHOCYTES # BLD AUTO: 0.42 K/UL — LOW (ref 1.2–3.4)
LYMPHOCYTES # BLD AUTO: 0.45 K/UL — LOW (ref 1.2–3.4)
LYMPHOCYTES # BLD AUTO: 0.63 K/UL — LOW (ref 1.2–3.4)
LYMPHOCYTES # BLD AUTO: 0.67 K/UL — LOW (ref 1.2–3.4)
LYMPHOCYTES # BLD AUTO: 0.74 K/UL — LOW (ref 1.2–3.4)
LYMPHOCYTES # BLD AUTO: 0.76 K/UL — LOW (ref 1.2–3.4)
LYMPHOCYTES # BLD AUTO: 0.81 K/UL — LOW (ref 1.2–3.4)
LYMPHOCYTES # BLD AUTO: 0.85 K/UL — LOW (ref 1.2–3.4)
LYMPHOCYTES # BLD AUTO: 0.86 K/UL — LOW (ref 1.2–3.4)
LYMPHOCYTES # BLD AUTO: 0.9 K/UL — LOW (ref 1.2–3.4)
LYMPHOCYTES # BLD AUTO: 0.92 K/UL — LOW (ref 1.2–3.4)
LYMPHOCYTES # BLD AUTO: 0.97 K/UL — LOW (ref 1.2–3.4)
LYMPHOCYTES # BLD AUTO: 0.98 K/UL — LOW (ref 1.2–3.4)
LYMPHOCYTES # BLD AUTO: 1 K/UL — LOW (ref 1.2–3.4)
LYMPHOCYTES # BLD AUTO: 1.03 K/UL — LOW (ref 1.2–3.4)
LYMPHOCYTES # BLD AUTO: 1.08 K/UL — LOW (ref 1.2–3.4)
LYMPHOCYTES # BLD AUTO: 1.11 K/UL — LOW (ref 1.2–3.4)
LYMPHOCYTES # BLD AUTO: 2.8 % — LOW (ref 20.5–51.1)
LYMPHOCYTES # BLD AUTO: 2.8 % — LOW (ref 20.5–51.1)
LYMPHOCYTES # BLD AUTO: 3.1 % — LOW (ref 20.5–51.1)
LYMPHOCYTES # BLD AUTO: 3.5 % — LOW (ref 20.5–51.1)
LYMPHOCYTES # BLD AUTO: 6.1 % — LOW (ref 20.5–51.1)
LYMPHOCYTES # BLD AUTO: 6.3 % — LOW (ref 20.5–51.1)
LYMPHOCYTES # BLD AUTO: 6.4 % — LOW (ref 20.5–51.1)
LYMPHOCYTES # BLD AUTO: 6.9 % — LOW (ref 20.5–51.1)
LYMPHOCYTES # BLD AUTO: 7 % — LOW (ref 20.5–51.1)
LYMPHOCYTES # BLD AUTO: 7.3 % — LOW (ref 20.5–51.1)
LYMPHOCYTES # BLD AUTO: 7.3 % — LOW (ref 20.5–51.1)
LYMPHOCYTES # BLD AUTO: 7.5 % — LOW (ref 20.5–51.1)
LYMPHOCYTES # BLD AUTO: 7.7 % — LOW (ref 20.5–51.1)
LYMPHOCYTES # BLD AUTO: 7.9 % — LOW (ref 20.5–51.1)
LYMPHOCYTES # BLD AUTO: 8 % — LOW (ref 20.5–51.1)
LYMPHOCYTES # BLD AUTO: 8.1 % — LOW (ref 20.5–51.1)
LYMPHOCYTES # BLD AUTO: 8.3 % — LOW (ref 20.5–51.1)
LYMPHOCYTES # BLD AUTO: 8.8 % — LOW (ref 20.5–51.1)
LYMPHOCYTES # BLD AUTO: 9.1 % — LOW (ref 20.5–51.1)
LYMPHOCYTES # BLD AUTO: 9.1 % — LOW (ref 20.5–51.1)
LYMPHOCYTES # BLD AUTO: 9.3 % — LOW (ref 20.5–51.1)
MAGNESIUM SERPL-MCNC: 1.6 MG/DL — LOW (ref 1.8–2.4)
MAGNESIUM SERPL-MCNC: 1.7 MG/DL — LOW (ref 1.8–2.4)
MAGNESIUM SERPL-MCNC: 1.8 MG/DL — SIGNIFICANT CHANGE UP (ref 1.8–2.4)
MAGNESIUM SERPL-MCNC: 1.9 MG/DL — SIGNIFICANT CHANGE UP (ref 1.8–2.4)
MAGNESIUM SERPL-MCNC: 1.9 MG/DL — SIGNIFICANT CHANGE UP (ref 1.8–2.4)
MAGNESIUM SERPL-MCNC: 2 MG/DL — SIGNIFICANT CHANGE UP (ref 1.8–2.4)
MAGNESIUM SERPL-MCNC: 2.1 MG/DL — SIGNIFICANT CHANGE UP (ref 1.8–2.4)
MAGNESIUM SERPL-MCNC: 2.1 MG/DL — SIGNIFICANT CHANGE UP (ref 1.8–2.4)
MAGNESIUM SERPL-MCNC: 2.2 MG/DL — SIGNIFICANT CHANGE UP (ref 1.8–2.4)
MAGNESIUM SERPL-MCNC: 2.2 MG/DL — SIGNIFICANT CHANGE UP (ref 1.8–2.4)
MAGNESIUM SERPL-MCNC: 2.3 MG/DL — SIGNIFICANT CHANGE UP (ref 1.8–2.4)
MAGNESIUM SERPL-MCNC: 2.3 MG/DL — SIGNIFICANT CHANGE UP (ref 1.8–2.4)
MCHC RBC-ENTMCNC: 30.6 PG — SIGNIFICANT CHANGE UP (ref 27–31)
MCHC RBC-ENTMCNC: 30.7 PG — SIGNIFICANT CHANGE UP (ref 27–31)
MCHC RBC-ENTMCNC: 30.8 PG — SIGNIFICANT CHANGE UP (ref 27–31)
MCHC RBC-ENTMCNC: 31 PG — SIGNIFICANT CHANGE UP (ref 27–31)
MCHC RBC-ENTMCNC: 31.1 PG — HIGH (ref 27–31)
MCHC RBC-ENTMCNC: 31.1 PG — HIGH (ref 27–31)
MCHC RBC-ENTMCNC: 31.3 PG — HIGH (ref 27–31)
MCHC RBC-ENTMCNC: 31.4 PG — HIGH (ref 27–31)
MCHC RBC-ENTMCNC: 31.5 PG — HIGH (ref 27–31)
MCHC RBC-ENTMCNC: 31.6 PG — HIGH (ref 27–31)
MCHC RBC-ENTMCNC: 31.6 PG — HIGH (ref 27–31)
MCHC RBC-ENTMCNC: 31.9 PG — HIGH (ref 27–31)
MCHC RBC-ENTMCNC: 34.7 G/DL — SIGNIFICANT CHANGE UP (ref 32–37)
MCHC RBC-ENTMCNC: 34.8 G/DL — SIGNIFICANT CHANGE UP (ref 32–37)
MCHC RBC-ENTMCNC: 35 G/DL — SIGNIFICANT CHANGE UP (ref 32–37)
MCHC RBC-ENTMCNC: 35.1 G/DL — SIGNIFICANT CHANGE UP (ref 32–37)
MCHC RBC-ENTMCNC: 35.4 G/DL — SIGNIFICANT CHANGE UP (ref 32–37)
MCHC RBC-ENTMCNC: 35.5 G/DL — SIGNIFICANT CHANGE UP (ref 32–37)
MCHC RBC-ENTMCNC: 35.6 G/DL — SIGNIFICANT CHANGE UP (ref 32–37)
MCHC RBC-ENTMCNC: 35.6 G/DL — SIGNIFICANT CHANGE UP (ref 32–37)
MCHC RBC-ENTMCNC: 35.7 G/DL — SIGNIFICANT CHANGE UP (ref 32–37)
MCHC RBC-ENTMCNC: 35.9 G/DL — SIGNIFICANT CHANGE UP (ref 32–37)
MCHC RBC-ENTMCNC: 36 G/DL — SIGNIFICANT CHANGE UP (ref 32–37)
MCHC RBC-ENTMCNC: 36.1 G/DL — SIGNIFICANT CHANGE UP (ref 32–37)
MCHC RBC-ENTMCNC: 36.1 G/DL — SIGNIFICANT CHANGE UP (ref 32–37)
MCHC RBC-ENTMCNC: 36.4 G/DL — SIGNIFICANT CHANGE UP (ref 32–37)
MCHC RBC-ENTMCNC: 36.4 G/DL — SIGNIFICANT CHANGE UP (ref 32–37)
MCV RBC AUTO: 86.2 FL — SIGNIFICANT CHANGE UP (ref 80–94)
MCV RBC AUTO: 86.2 FL — SIGNIFICANT CHANGE UP (ref 80–94)
MCV RBC AUTO: 86.3 FL — SIGNIFICANT CHANGE UP (ref 80–94)
MCV RBC AUTO: 86.3 FL — SIGNIFICANT CHANGE UP (ref 80–94)
MCV RBC AUTO: 86.5 FL — SIGNIFICANT CHANGE UP (ref 80–94)
MCV RBC AUTO: 86.9 FL — SIGNIFICANT CHANGE UP (ref 80–94)
MCV RBC AUTO: 87.1 FL — SIGNIFICANT CHANGE UP (ref 80–94)
MCV RBC AUTO: 87.3 FL — SIGNIFICANT CHANGE UP (ref 80–94)
MCV RBC AUTO: 87.4 FL — SIGNIFICANT CHANGE UP (ref 80–94)
MCV RBC AUTO: 87.4 FL — SIGNIFICANT CHANGE UP (ref 80–94)
MCV RBC AUTO: 87.5 FL — SIGNIFICANT CHANGE UP (ref 80–94)
MCV RBC AUTO: 87.7 FL — SIGNIFICANT CHANGE UP (ref 80–94)
MCV RBC AUTO: 87.9 FL — SIGNIFICANT CHANGE UP (ref 80–94)
MCV RBC AUTO: 88 FL — SIGNIFICANT CHANGE UP (ref 80–94)
MCV RBC AUTO: 88.1 FL — SIGNIFICANT CHANGE UP (ref 80–94)
MCV RBC AUTO: 88.2 FL — SIGNIFICANT CHANGE UP (ref 80–94)
MCV RBC AUTO: 88.3 FL — SIGNIFICANT CHANGE UP (ref 80–94)
MCV RBC AUTO: 88.4 FL — SIGNIFICANT CHANGE UP (ref 80–94)
MCV RBC AUTO: 88.5 FL — SIGNIFICANT CHANGE UP (ref 80–94)
MCV RBC AUTO: 89.4 FL — SIGNIFICANT CHANGE UP (ref 80–94)
MCV RBC AUTO: 90.6 FL — SIGNIFICANT CHANGE UP (ref 80–94)
MITOCHONDRIA AB SER-ACNC: SIGNIFICANT CHANGE UP
MONOCYTES # BLD AUTO: 0.06 K/UL — LOW (ref 0.1–0.6)
MONOCYTES # BLD AUTO: 0.06 K/UL — LOW (ref 0.1–0.6)
MONOCYTES # BLD AUTO: 0.09 K/UL — LOW (ref 0.1–0.6)
MONOCYTES # BLD AUTO: 0.11 K/UL — SIGNIFICANT CHANGE UP (ref 0.1–0.6)
MONOCYTES # BLD AUTO: 0.14 K/UL — SIGNIFICANT CHANGE UP (ref 0.1–0.6)
MONOCYTES # BLD AUTO: 0.18 K/UL — SIGNIFICANT CHANGE UP (ref 0.1–0.6)
MONOCYTES # BLD AUTO: 0.2 K/UL — SIGNIFICANT CHANGE UP (ref 0.1–0.6)
MONOCYTES # BLD AUTO: 0.83 K/UL — HIGH (ref 0.1–0.6)
MONOCYTES # BLD AUTO: 1.06 K/UL — HIGH (ref 0.1–0.6)
MONOCYTES # BLD AUTO: 1.12 K/UL — HIGH (ref 0.1–0.6)
MONOCYTES # BLD AUTO: 1.13 K/UL — HIGH (ref 0.1–0.6)
MONOCYTES # BLD AUTO: 1.13 K/UL — HIGH (ref 0.1–0.6)
MONOCYTES # BLD AUTO: 1.14 K/UL — HIGH (ref 0.1–0.6)
MONOCYTES # BLD AUTO: 1.14 K/UL — HIGH (ref 0.1–0.6)
MONOCYTES # BLD AUTO: 1.15 K/UL — HIGH (ref 0.1–0.6)
MONOCYTES # BLD AUTO: 1.17 K/UL — HIGH (ref 0.1–0.6)
MONOCYTES # BLD AUTO: 1.24 K/UL — HIGH (ref 0.1–0.6)
MONOCYTES # BLD AUTO: 1.27 K/UL — HIGH (ref 0.1–0.6)
MONOCYTES # BLD AUTO: 1.31 K/UL — HIGH (ref 0.1–0.6)
MONOCYTES # BLD AUTO: 1.39 K/UL — HIGH (ref 0.1–0.6)
MONOCYTES # BLD AUTO: 1.67 K/UL — HIGH (ref 0.1–0.6)
MONOCYTES NFR BLD AUTO: 0.9 % — LOW (ref 1.7–9.3)
MONOCYTES NFR BLD AUTO: 1.1 % — LOW (ref 1.7–9.3)
MONOCYTES NFR BLD AUTO: 1.3 % — LOW (ref 1.7–9.3)
MONOCYTES NFR BLD AUTO: 1.4 % — LOW (ref 1.7–9.3)
MONOCYTES NFR BLD AUTO: 1.8 % — SIGNIFICANT CHANGE UP (ref 1.7–9.3)
MONOCYTES NFR BLD AUTO: 1.9 % — SIGNIFICANT CHANGE UP (ref 1.7–9.3)
MONOCYTES NFR BLD AUTO: 10.1 % — HIGH (ref 1.7–9.3)
MONOCYTES NFR BLD AUTO: 10.7 % — HIGH (ref 1.7–9.3)
MONOCYTES NFR BLD AUTO: 10.9 % — HIGH (ref 1.7–9.3)
MONOCYTES NFR BLD AUTO: 11.2 % — HIGH (ref 1.7–9.3)
MONOCYTES NFR BLD AUTO: 11.2 % — HIGH (ref 1.7–9.3)
MONOCYTES NFR BLD AUTO: 12 % — HIGH (ref 1.7–9.3)
MONOCYTES NFR BLD AUTO: 15.5 % — HIGH (ref 1.7–9.3)
MONOCYTES NFR BLD AUTO: 3.5 % — SIGNIFICANT CHANGE UP (ref 1.7–9.3)
MONOCYTES NFR BLD AUTO: 5.8 % — SIGNIFICANT CHANGE UP (ref 1.7–9.3)
MONOCYTES NFR BLD AUTO: 8.1 % — SIGNIFICANT CHANGE UP (ref 1.7–9.3)
MONOCYTES NFR BLD AUTO: 8.4 % — SIGNIFICANT CHANGE UP (ref 1.7–9.3)
MONOCYTES NFR BLD AUTO: 9 % — SIGNIFICANT CHANGE UP (ref 1.7–9.3)
MONOCYTES NFR BLD AUTO: 9.2 % — SIGNIFICANT CHANGE UP (ref 1.7–9.3)
MONOCYTES NFR BLD AUTO: 9.2 % — SIGNIFICANT CHANGE UP (ref 1.7–9.3)
MONOCYTES NFR BLD AUTO: 9.6 % — HIGH (ref 1.7–9.3)
MRSA PCR RESULT.: NEGATIVE — SIGNIFICANT CHANGE UP
NEUTROPHILS # BLD AUTO: 10.36 K/UL — HIGH (ref 1.4–6.5)
NEUTROPHILS # BLD AUTO: 11.35 K/UL — HIGH (ref 1.4–6.5)
NEUTROPHILS # BLD AUTO: 11.8 K/UL — HIGH (ref 1.4–6.5)
NEUTROPHILS # BLD AUTO: 12.98 K/UL — HIGH (ref 1.4–6.5)
NEUTROPHILS # BLD AUTO: 14.1 K/UL — HIGH (ref 1.4–6.5)
NEUTROPHILS # BLD AUTO: 3.67 K/UL — SIGNIFICANT CHANGE UP (ref 1.4–6.5)
NEUTROPHILS # BLD AUTO: 3.78 K/UL — SIGNIFICANT CHANGE UP (ref 1.4–6.5)
NEUTROPHILS # BLD AUTO: 4.56 K/UL — SIGNIFICANT CHANGE UP (ref 1.4–6.5)
NEUTROPHILS # BLD AUTO: 6.47 K/UL — SIGNIFICANT CHANGE UP (ref 1.4–6.5)
NEUTROPHILS # BLD AUTO: 8.04 K/UL — HIGH (ref 1.4–6.5)
NEUTROPHILS # BLD AUTO: 8.36 K/UL — HIGH (ref 1.4–6.5)
NEUTROPHILS # BLD AUTO: 8.65 K/UL — HIGH (ref 1.4–6.5)
NEUTROPHILS # BLD AUTO: 8.69 K/UL — HIGH (ref 1.4–6.5)
NEUTROPHILS # BLD AUTO: 8.78 K/UL — HIGH (ref 1.4–6.5)
NEUTROPHILS # BLD AUTO: 8.83 K/UL — HIGH (ref 1.4–6.5)
NEUTROPHILS # BLD AUTO: 8.91 K/UL — HIGH (ref 1.4–6.5)
NEUTROPHILS # BLD AUTO: 9.02 K/UL — HIGH (ref 1.4–6.5)
NEUTROPHILS # BLD AUTO: 9.16 K/UL — HIGH (ref 1.4–6.5)
NEUTROPHILS # BLD AUTO: 9.85 K/UL — HIGH (ref 1.4–6.5)
NEUTROPHILS # BLD AUTO: 9.98 K/UL — HIGH (ref 1.4–6.5)
NEUTROPHILS # BLD AUTO: 9.99 K/UL — HIGH (ref 1.4–6.5)
NEUTROPHILS NFR BLD AUTO: 74.8 % — SIGNIFICANT CHANGE UP (ref 42.2–75.2)
NEUTROPHILS NFR BLD AUTO: 76.6 % — HIGH (ref 42.2–75.2)
NEUTROPHILS NFR BLD AUTO: 77.9 % — HIGH (ref 42.2–75.2)
NEUTROPHILS NFR BLD AUTO: 78.6 % — HIGH (ref 42.2–75.2)
NEUTROPHILS NFR BLD AUTO: 79.4 % — HIGH (ref 42.2–75.2)
NEUTROPHILS NFR BLD AUTO: 80.4 % — HIGH (ref 42.2–75.2)
NEUTROPHILS NFR BLD AUTO: 80.5 % — HIGH (ref 42.2–75.2)
NEUTROPHILS NFR BLD AUTO: 80.6 % — HIGH (ref 42.2–75.2)
NEUTROPHILS NFR BLD AUTO: 81.3 % — HIGH (ref 42.2–75.2)
NEUTROPHILS NFR BLD AUTO: 81.4 % — HIGH (ref 42.2–75.2)
NEUTROPHILS NFR BLD AUTO: 82 % — HIGH (ref 42.2–75.2)
NEUTROPHILS NFR BLD AUTO: 83.4 % — HIGH (ref 42.2–75.2)
NEUTROPHILS NFR BLD AUTO: 85 % — HIGH (ref 42.2–75.2)
NEUTROPHILS NFR BLD AUTO: 88.7 % — HIGH (ref 42.2–75.2)
NEUTROPHILS NFR BLD AUTO: 89 % — HIGH (ref 42.2–75.2)
NEUTROPHILS NFR BLD AUTO: 89.6 % — HIGH (ref 42.2–75.2)
NEUTROPHILS NFR BLD AUTO: 90.2 % — HIGH (ref 42.2–75.2)
NEUTROPHILS NFR BLD AUTO: 91.4 % — HIGH (ref 42.2–75.2)
NEUTROPHILS NFR BLD AUTO: 93 % — HIGH (ref 42.2–75.2)
NEUTROPHILS NFR BLD AUTO: 95 % — HIGH (ref 42.2–75.2)
NEUTROPHILS NFR BLD AUTO: 95.6 % — HIGH (ref 42.2–75.2)
NIGHT BLUE STAIN TISS: SIGNIFICANT CHANGE UP
NITRITE UR-MCNC: NEGATIVE — SIGNIFICANT CHANGE UP
NITRITE UR-MCNC: NEGATIVE — SIGNIFICANT CHANGE UP
NITRITE UR-MCNC: POSITIVE
NON HDL CHOLESTEROL: 169 MG/DL — HIGH
NON-GYNECOLOGICAL CYTOLOGY STUDY: SIGNIFICANT CHANGE UP
NRBC # BLD: 0 /100 WBCS — SIGNIFICANT CHANGE UP (ref 0–0)
NRBC # BLD: 1 /100 WBCS — HIGH (ref 0–0)
NRBC # BLD: 1 /100 WBCS — HIGH (ref 0–0)
NRBC # BLD: 2 /100 WBCS — HIGH (ref 0–0)
NRBC # BLD: 6 /100 WBCS — HIGH (ref 0–0)
NT-PROBNP SERPL-SCNC: 452 PG/ML — HIGH (ref 0–300)
PCO2 BLDA: 60 MMHG — HIGH (ref 35–48)
PH BLDA: 7.15 — CRITICAL LOW (ref 7.35–7.45)
PH UR: 5 — SIGNIFICANT CHANGE UP (ref 5–8)
PH UR: 5.5 — SIGNIFICANT CHANGE UP (ref 5–8)
PH UR: 5.5 — SIGNIFICANT CHANGE UP (ref 5–8)
PHOSPHATE SERPL-MCNC: 2.5 MG/DL — SIGNIFICANT CHANGE UP (ref 2.1–4.9)
PHOSPHATE SERPL-MCNC: 2.6 MG/DL — SIGNIFICANT CHANGE UP (ref 2.1–4.9)
PHOSPHATE SERPL-MCNC: 2.6 MG/DL — SIGNIFICANT CHANGE UP (ref 2.1–4.9)
PHOSPHATE SERPL-MCNC: 2.7 MG/DL — SIGNIFICANT CHANGE UP (ref 2.1–4.9)
PHOSPHATE SERPL-MCNC: 3 MG/DL — SIGNIFICANT CHANGE UP (ref 2.1–4.9)
PHOSPHATE SERPL-MCNC: 3.6 MG/DL — SIGNIFICANT CHANGE UP (ref 2.1–4.9)
PHOSPHATE SERPL-MCNC: 4.8 MG/DL — SIGNIFICANT CHANGE UP (ref 2.1–4.9)
PLATELET # BLD AUTO: 128 K/UL — LOW (ref 130–400)
PLATELET # BLD AUTO: 152 K/UL — SIGNIFICANT CHANGE UP (ref 130–400)
PLATELET # BLD AUTO: 181 K/UL — SIGNIFICANT CHANGE UP (ref 130–400)
PLATELET # BLD AUTO: 188 K/UL — SIGNIFICANT CHANGE UP (ref 130–400)
PLATELET # BLD AUTO: 196 K/UL — SIGNIFICANT CHANGE UP (ref 130–400)
PLATELET # BLD AUTO: 210 K/UL — SIGNIFICANT CHANGE UP (ref 130–400)
PLATELET # BLD AUTO: 246 K/UL — SIGNIFICANT CHANGE UP (ref 130–400)
PLATELET # BLD AUTO: 251 K/UL — SIGNIFICANT CHANGE UP (ref 130–400)
PLATELET # BLD AUTO: 256 K/UL — SIGNIFICANT CHANGE UP (ref 130–400)
PLATELET # BLD AUTO: 261 K/UL — SIGNIFICANT CHANGE UP (ref 130–400)
PLATELET # BLD AUTO: 266 K/UL — SIGNIFICANT CHANGE UP (ref 130–400)
PLATELET # BLD AUTO: 271 K/UL — SIGNIFICANT CHANGE UP (ref 130–400)
PLATELET # BLD AUTO: 282 K/UL — SIGNIFICANT CHANGE UP (ref 130–400)
PLATELET # BLD AUTO: 290 K/UL — SIGNIFICANT CHANGE UP (ref 130–400)
PLATELET # BLD AUTO: 291 K/UL — SIGNIFICANT CHANGE UP (ref 130–400)
PLATELET # BLD AUTO: 292 K/UL — SIGNIFICANT CHANGE UP (ref 130–400)
PLATELET # BLD AUTO: 301 K/UL — SIGNIFICANT CHANGE UP (ref 130–400)
PLATELET # BLD AUTO: 302 K/UL — SIGNIFICANT CHANGE UP (ref 130–400)
PLATELET # BLD AUTO: 303 K/UL — SIGNIFICANT CHANGE UP (ref 130–400)
PLATELET # BLD AUTO: 304 K/UL — SIGNIFICANT CHANGE UP (ref 130–400)
PLATELET # BLD AUTO: 309 K/UL — SIGNIFICANT CHANGE UP (ref 130–400)
PLATELET # BLD AUTO: 339 K/UL — SIGNIFICANT CHANGE UP (ref 130–400)
PLATELET # BLD AUTO: 62 K/UL — LOW (ref 130–400)
PLATELET # BLD AUTO: 73 K/UL — LOW (ref 130–400)
PLATELET # BLD AUTO: 96 K/UL — LOW (ref 130–400)
PMV BLD: 10.2 FL — SIGNIFICANT CHANGE UP (ref 7.4–10.4)
PMV BLD: 10.2 FL — SIGNIFICANT CHANGE UP (ref 7.4–10.4)
PMV BLD: 10.4 FL — SIGNIFICANT CHANGE UP (ref 7.4–10.4)
PMV BLD: 10.5 FL — HIGH (ref 7.4–10.4)
PMV BLD: 10.5 FL — HIGH (ref 7.4–10.4)
PMV BLD: 10.6 FL — HIGH (ref 7.4–10.4)
PMV BLD: 10.6 FL — HIGH (ref 7.4–10.4)
PMV BLD: 10.7 FL — HIGH (ref 7.4–10.4)
PMV BLD: 10.8 FL — HIGH (ref 7.4–10.4)
PMV BLD: 10.9 FL — HIGH (ref 7.4–10.4)
PMV BLD: 10.9 FL — HIGH (ref 7.4–10.4)
PMV BLD: 11 FL — HIGH (ref 7.4–10.4)
PMV BLD: 11.1 FL — HIGH (ref 7.4–10.4)
PMV BLD: 11.2 FL — HIGH (ref 7.4–10.4)
PMV BLD: 11.3 FL — HIGH (ref 7.4–10.4)
PMV BLD: 11.4 FL — HIGH (ref 7.4–10.4)
PMV BLD: 12.2 FL — HIGH (ref 7.4–10.4)
PO2 BLDA: 168 MMHG — HIGH (ref 83–108)
POTASSIUM SERPL-MCNC: 3.3 MMOL/L — LOW (ref 3.5–5)
POTASSIUM SERPL-MCNC: 3.5 MMOL/L — SIGNIFICANT CHANGE UP (ref 3.5–5)
POTASSIUM SERPL-MCNC: 3.7 MMOL/L — SIGNIFICANT CHANGE UP (ref 3.5–5)
POTASSIUM SERPL-MCNC: 3.7 MMOL/L — SIGNIFICANT CHANGE UP (ref 3.5–5)
POTASSIUM SERPL-MCNC: 3.8 MMOL/L — SIGNIFICANT CHANGE UP (ref 3.5–5)
POTASSIUM SERPL-MCNC: 3.9 MMOL/L — SIGNIFICANT CHANGE UP (ref 3.5–5)
POTASSIUM SERPL-MCNC: 4 MMOL/L — SIGNIFICANT CHANGE UP (ref 3.5–5)
POTASSIUM SERPL-MCNC: 4.1 MMOL/L — SIGNIFICANT CHANGE UP (ref 3.5–5)
POTASSIUM SERPL-MCNC: 4.2 MMOL/L — SIGNIFICANT CHANGE UP (ref 3.5–5)
POTASSIUM SERPL-MCNC: 4.3 MMOL/L — SIGNIFICANT CHANGE UP (ref 3.5–5)
POTASSIUM SERPL-MCNC: 4.6 MMOL/L — SIGNIFICANT CHANGE UP (ref 3.5–5)
POTASSIUM SERPL-MCNC: 5.5 MMOL/L — HIGH (ref 3.5–5)
POTASSIUM SERPL-MCNC: 5.7 MMOL/L — HIGH (ref 3.5–5)
POTASSIUM SERPL-SCNC: 3.3 MMOL/L — LOW (ref 3.5–5)
POTASSIUM SERPL-SCNC: 3.5 MMOL/L — SIGNIFICANT CHANGE UP (ref 3.5–5)
POTASSIUM SERPL-SCNC: 3.7 MMOL/L — SIGNIFICANT CHANGE UP (ref 3.5–5)
POTASSIUM SERPL-SCNC: 3.7 MMOL/L — SIGNIFICANT CHANGE UP (ref 3.5–5)
POTASSIUM SERPL-SCNC: 3.8 MMOL/L — SIGNIFICANT CHANGE UP (ref 3.5–5)
POTASSIUM SERPL-SCNC: 3.9 MMOL/L — SIGNIFICANT CHANGE UP (ref 3.5–5)
POTASSIUM SERPL-SCNC: 4 MMOL/L — SIGNIFICANT CHANGE UP (ref 3.5–5)
POTASSIUM SERPL-SCNC: 4.1 MMOL/L — SIGNIFICANT CHANGE UP (ref 3.5–5)
POTASSIUM SERPL-SCNC: 4.2 MMOL/L — SIGNIFICANT CHANGE UP (ref 3.5–5)
POTASSIUM SERPL-SCNC: 4.3 MMOL/L — SIGNIFICANT CHANGE UP (ref 3.5–5)
POTASSIUM SERPL-SCNC: 4.6 MMOL/L — SIGNIFICANT CHANGE UP (ref 3.5–5)
POTASSIUM SERPL-SCNC: 5.5 MMOL/L — HIGH (ref 3.5–5)
POTASSIUM SERPL-SCNC: 5.7 MMOL/L — HIGH (ref 3.5–5)
PROT SERPL-MCNC: 4.4 G/DL — LOW (ref 6–8)
PROT SERPL-MCNC: 4.5 G/DL — LOW (ref 6–8)
PROT SERPL-MCNC: 4.5 G/DL — LOW (ref 6–8)
PROT SERPL-MCNC: 4.6 G/DL — LOW (ref 6–8)
PROT SERPL-MCNC: 4.8 G/DL — LOW (ref 6–8)
PROT SERPL-MCNC: 4.9 G/DL — LOW (ref 6–8)
PROT SERPL-MCNC: 5 G/DL — LOW (ref 6–8)
PROT SERPL-MCNC: 5.2 G/DL — LOW (ref 6–8)
PROT SERPL-MCNC: 5.2 G/DL — LOW (ref 6–8)
PROT SERPL-MCNC: 5.3 G/DL — LOW (ref 6–8)
PROT SERPL-MCNC: 5.3 G/DL — LOW (ref 6–8)
PROT SERPL-MCNC: 5.6 G/DL — LOW (ref 6–8)
PROT SERPL-MCNC: 5.6 G/DL — LOW (ref 6–8)
PROT SERPL-MCNC: 6.4 G/DL — SIGNIFICANT CHANGE UP (ref 6–8)
PROT UR-MCNC: 100 MG/DL
PROT UR-MCNC: 30 MG/DL
PROT UR-MCNC: 30 MG/DL
PROTHROM AB SERPL-ACNC: 12.1 SEC — SIGNIFICANT CHANGE UP (ref 9.95–12.87)
PROTHROM AB SERPL-ACNC: 13.4 SEC — HIGH (ref 9.95–12.87)
RAPID RVP RESULT: SIGNIFICANT CHANGE UP
RBC # BLD: 2.61 M/UL — LOW (ref 4.7–6.1)
RBC # BLD: 2.93 M/UL — LOW (ref 4.7–6.1)
RBC # BLD: 3 M/UL — LOW (ref 4.7–6.1)
RBC # BLD: 3.01 M/UL — LOW (ref 4.7–6.1)
RBC # BLD: 3.02 M/UL — LOW (ref 4.7–6.1)
RBC # BLD: 3.02 M/UL — LOW (ref 4.7–6.1)
RBC # BLD: 3.03 M/UL — LOW (ref 4.7–6.1)
RBC # BLD: 3.06 M/UL — LOW (ref 4.7–6.1)
RBC # BLD: 3.18 M/UL — LOW (ref 4.7–6.1)
RBC # BLD: 3.19 M/UL — LOW (ref 4.7–6.1)
RBC # BLD: 3.22 M/UL — LOW (ref 4.7–6.1)
RBC # BLD: 3.33 M/UL — LOW (ref 4.7–6.1)
RBC # BLD: 3.39 M/UL — LOW (ref 4.7–6.1)
RBC # BLD: 3.41 M/UL — LOW (ref 4.7–6.1)
RBC # BLD: 3.56 M/UL — LOW (ref 4.7–6.1)
RBC # BLD: 3.58 M/UL — LOW (ref 4.7–6.1)
RBC # BLD: 3.61 M/UL — LOW (ref 4.7–6.1)
RBC # BLD: 3.73 M/UL — LOW (ref 4.7–6.1)
RBC # BLD: 3.83 M/UL — LOW (ref 4.7–6.1)
RBC # BLD: 3.89 M/UL — LOW (ref 4.7–6.1)
RBC # BLD: 3.94 M/UL — LOW (ref 4.7–6.1)
RBC # BLD: 3.95 M/UL — LOW (ref 4.7–6.1)
RBC # BLD: 4.05 M/UL — LOW (ref 4.7–6.1)
RBC # BLD: 4.06 M/UL — LOW (ref 4.7–6.1)
RBC # BLD: 4.36 M/UL — LOW (ref 4.7–6.1)
RBC # FLD: 17 % — HIGH (ref 11.5–14.5)
RBC # FLD: 17.2 % — HIGH (ref 11.5–14.5)
RBC # FLD: 17.9 % — HIGH (ref 11.5–14.5)
RBC # FLD: 18.2 % — HIGH (ref 11.5–14.5)
RBC # FLD: 18.6 % — HIGH (ref 11.5–14.5)
RBC # FLD: 19 % — HIGH (ref 11.5–14.5)
RBC # FLD: 19.1 % — HIGH (ref 11.5–14.5)
RBC # FLD: 19.5 % — HIGH (ref 11.5–14.5)
RBC # FLD: 19.7 % — HIGH (ref 11.5–14.5)
RBC # FLD: 20.2 % — HIGH (ref 11.5–14.5)
RBC # FLD: 21.4 % — HIGH (ref 11.5–14.5)
RBC # FLD: 21.5 % — HIGH (ref 11.5–14.5)
RBC # FLD: 21.7 % — HIGH (ref 11.5–14.5)
RBC # FLD: 21.8 % — HIGH (ref 11.5–14.5)
RBC # FLD: 21.9 % — HIGH (ref 11.5–14.5)
RBC # FLD: 22 % — HIGH (ref 11.5–14.5)
RBC # FLD: 22.1 % — HIGH (ref 11.5–14.5)
RBC # FLD: 22.1 % — HIGH (ref 11.5–14.5)
RBC # FLD: 22.2 % — HIGH (ref 11.5–14.5)
RBC # FLD: 22.3 % — HIGH (ref 11.5–14.5)
RBC # FLD: 22.4 % — HIGH (ref 11.5–14.5)
RBC # FLD: 22.5 % — HIGH (ref 11.5–14.5)
RBC # FLD: 22.9 % — HIGH (ref 11.5–14.5)
RBC CASTS # UR COMP ASSIST: 147 /HPF — HIGH (ref 0–4)
RBC CASTS # UR COMP ASSIST: >1900 /HPF — HIGH (ref 0–4)
SAO2 % BLDA: 98.7 % — HIGH (ref 94–98)
SARS-COV-2 RNA SPEC QL NAA+PROBE: SIGNIFICANT CHANGE UP
SMOOTH MUSCLE AB SER-ACNC: SIGNIFICANT CHANGE UP
SODIUM SERPL-SCNC: 133 MMOL/L — LOW (ref 135–146)
SODIUM SERPL-SCNC: 135 MMOL/L — SIGNIFICANT CHANGE UP (ref 135–146)
SODIUM SERPL-SCNC: 137 MMOL/L — SIGNIFICANT CHANGE UP (ref 135–146)
SODIUM SERPL-SCNC: 138 MMOL/L — SIGNIFICANT CHANGE UP (ref 135–146)
SODIUM SERPL-SCNC: 138 MMOL/L — SIGNIFICANT CHANGE UP (ref 135–146)
SODIUM SERPL-SCNC: 139 MMOL/L — SIGNIFICANT CHANGE UP (ref 135–146)
SODIUM SERPL-SCNC: 140 MMOL/L — SIGNIFICANT CHANGE UP (ref 135–146)
SODIUM SERPL-SCNC: 141 MMOL/L — SIGNIFICANT CHANGE UP (ref 135–146)
SODIUM SERPL-SCNC: 142 MMOL/L — SIGNIFICANT CHANGE UP (ref 135–146)
SODIUM SERPL-SCNC: 143 MMOL/L — SIGNIFICANT CHANGE UP (ref 135–146)
SODIUM SERPL-SCNC: 144 MMOL/L — SIGNIFICANT CHANGE UP (ref 135–146)
SP GR SPEC: 1.02 — SIGNIFICANT CHANGE UP (ref 1–1.03)
SPECIMEN SOURCE: SIGNIFICANT CHANGE UP
SQUAMOUS # UR AUTO: 11 /HPF — HIGH (ref 0–5)
SQUAMOUS # UR AUTO: 3 /HPF — SIGNIFICANT CHANGE UP (ref 0–5)
T4 FREE+ TSH PNL SERPL: 1.97 UIU/ML — SIGNIFICANT CHANGE UP (ref 0.27–4.2)
TRIGL SERPL-MCNC: 175 MG/DL — HIGH
TROPONIN T, HIGH SENSITIVITY RESULT: 44 NG/L — HIGH (ref 6–21)
TROPONIN T, HIGH SENSITIVITY RESULT: 47 NG/L — HIGH (ref 6–21)
TSH SERPL-MCNC: 1.11 UIU/ML — SIGNIFICANT CHANGE UP (ref 0.27–4.2)
URATE CRY FLD QL MICRO: PRESENT
URATE CRY FLD QL MICRO: PRESENT
UROBILINOGEN FLD QL: 0.2 MG/DL — SIGNIFICANT CHANGE UP (ref 0.2–1)
UROBILINOGEN FLD QL: 1 MG/DL — SIGNIFICANT CHANGE UP (ref 0.2–1)
UROBILINOGEN FLD QL: 1 MG/DL — SIGNIFICANT CHANGE UP (ref 0.2–1)
WBC # BLD: 10.72 K/UL — SIGNIFICANT CHANGE UP (ref 4.8–10.8)
WBC # BLD: 10.75 K/UL — SIGNIFICANT CHANGE UP (ref 4.8–10.8)
WBC # BLD: 10.78 K/UL — SIGNIFICANT CHANGE UP (ref 4.8–10.8)
WBC # BLD: 11.05 K/UL — HIGH (ref 4.8–10.8)
WBC # BLD: 11.1 K/UL — HIGH (ref 4.8–10.8)
WBC # BLD: 11.32 K/UL — HIGH (ref 4.8–10.8)
WBC # BLD: 11.63 K/UL — HIGH (ref 4.8–10.8)
WBC # BLD: 11.66 K/UL — HIGH (ref 4.8–10.8)
WBC # BLD: 12.2 K/UL — HIGH (ref 4.8–10.8)
WBC # BLD: 12.26 K/UL — HIGH (ref 4.8–10.8)
WBC # BLD: 12.29 K/UL — HIGH (ref 4.8–10.8)
WBC # BLD: 12.72 K/UL — HIGH (ref 4.8–10.8)
WBC # BLD: 13.63 K/UL — HIGH (ref 4.8–10.8)
WBC # BLD: 13.9 K/UL — HIGH (ref 4.8–10.8)
WBC # BLD: 14.39 K/UL — HIGH (ref 4.8–10.8)
WBC # BLD: 14.85 K/UL — HIGH (ref 4.8–10.8)
WBC # BLD: 3.95 K/UL — LOW (ref 4.8–10.8)
WBC # BLD: 4.21 K/UL — LOW (ref 4.8–10.8)
WBC # BLD: 4.22 K/UL — LOW (ref 4.8–10.8)
WBC # BLD: 5.12 K/UL — SIGNIFICANT CHANGE UP (ref 4.8–10.8)
WBC # BLD: 6.27 K/UL — SIGNIFICANT CHANGE UP (ref 4.8–10.8)
WBC # BLD: 6.77 K/UL — SIGNIFICANT CHANGE UP (ref 4.8–10.8)
WBC # BLD: 7.9 K/UL — SIGNIFICANT CHANGE UP (ref 4.8–10.8)
WBC # BLD: 9.42 K/UL — SIGNIFICANT CHANGE UP (ref 4.8–10.8)
WBC # BLD: 9.87 K/UL — SIGNIFICANT CHANGE UP (ref 4.8–10.8)
WBC # FLD AUTO: 10.72 K/UL — SIGNIFICANT CHANGE UP (ref 4.8–10.8)
WBC # FLD AUTO: 10.75 K/UL — SIGNIFICANT CHANGE UP (ref 4.8–10.8)
WBC # FLD AUTO: 10.78 K/UL — SIGNIFICANT CHANGE UP (ref 4.8–10.8)
WBC # FLD AUTO: 11.05 K/UL — HIGH (ref 4.8–10.8)
WBC # FLD AUTO: 11.1 K/UL — HIGH (ref 4.8–10.8)
WBC # FLD AUTO: 11.32 K/UL — HIGH (ref 4.8–10.8)
WBC # FLD AUTO: 11.63 K/UL — HIGH (ref 4.8–10.8)
WBC # FLD AUTO: 11.66 K/UL — HIGH (ref 4.8–10.8)
WBC # FLD AUTO: 12.2 K/UL — HIGH (ref 4.8–10.8)
WBC # FLD AUTO: 12.26 K/UL — HIGH (ref 4.8–10.8)
WBC # FLD AUTO: 12.29 K/UL — HIGH (ref 4.8–10.8)
WBC # FLD AUTO: 12.72 K/UL — HIGH (ref 4.8–10.8)
WBC # FLD AUTO: 13.63 K/UL — HIGH (ref 4.8–10.8)
WBC # FLD AUTO: 13.9 K/UL — HIGH (ref 4.8–10.8)
WBC # FLD AUTO: 14.39 K/UL — HIGH (ref 4.8–10.8)
WBC # FLD AUTO: 14.85 K/UL — HIGH (ref 4.8–10.8)
WBC # FLD AUTO: 3.95 K/UL — LOW (ref 4.8–10.8)
WBC # FLD AUTO: 4.21 K/UL — LOW (ref 4.8–10.8)
WBC # FLD AUTO: 4.22 K/UL — LOW (ref 4.8–10.8)
WBC # FLD AUTO: 5.12 K/UL — SIGNIFICANT CHANGE UP (ref 4.8–10.8)
WBC # FLD AUTO: 6.27 K/UL — SIGNIFICANT CHANGE UP (ref 4.8–10.8)
WBC # FLD AUTO: 6.77 K/UL — SIGNIFICANT CHANGE UP (ref 4.8–10.8)
WBC # FLD AUTO: 7.9 K/UL — SIGNIFICANT CHANGE UP (ref 4.8–10.8)
WBC # FLD AUTO: 9.42 K/UL — SIGNIFICANT CHANGE UP (ref 4.8–10.8)
WBC # FLD AUTO: 9.87 K/UL — SIGNIFICANT CHANGE UP (ref 4.8–10.8)
WBC UR QL: 12 /HPF — HIGH (ref 0–5)
WBC UR QL: 49 /HPF — HIGH (ref 0–5)

## 2024-01-01 PROCEDURE — 71045 X-RAY EXAM CHEST 1 VIEW: CPT | Mod: 26

## 2024-01-01 PROCEDURE — 80074 ACUTE HEPATITIS PANEL: CPT

## 2024-01-01 PROCEDURE — 99497 ADVNCD CARE PLAN 30 MIN: CPT | Mod: 25

## 2024-01-01 PROCEDURE — 74176 CT ABD & PELVIS W/O CONTRAST: CPT | Mod: 26,MC

## 2024-01-01 PROCEDURE — 99233 SBSQ HOSP IP/OBS HIGH 50: CPT

## 2024-01-01 PROCEDURE — 87641 MR-STAPH DNA AMP PROBE: CPT

## 2024-01-01 PROCEDURE — 74183 MRI ABD W/O CNTR FLWD CNTR: CPT | Mod: MC

## 2024-01-01 PROCEDURE — 76705 ECHO EXAM OF ABDOMEN: CPT

## 2024-01-01 PROCEDURE — 82784 ASSAY IGA/IGD/IGG/IGM EACH: CPT

## 2024-01-01 PROCEDURE — 99232 SBSQ HOSP IP/OBS MODERATE 35: CPT

## 2024-01-01 PROCEDURE — 71275 CT ANGIOGRAPHY CHEST: CPT | Mod: 26

## 2024-01-01 PROCEDURE — 99153 MOD SED SAME PHYS/QHP EA: CPT

## 2024-01-01 PROCEDURE — 93306 TTE W/DOPPLER COMPLETE: CPT | Mod: 26

## 2024-01-01 PROCEDURE — 99222 1ST HOSP IP/OBS MODERATE 55: CPT

## 2024-01-01 PROCEDURE — 99231 SBSQ HOSP IP/OBS SF/LOW 25: CPT

## 2024-01-01 PROCEDURE — 99223 1ST HOSP IP/OBS HIGH 75: CPT

## 2024-01-01 PROCEDURE — 84145 PROCALCITONIN (PCT): CPT

## 2024-01-01 PROCEDURE — 87449 NOS EACH ORGANISM AG IA: CPT

## 2024-01-01 PROCEDURE — 80048 BASIC METABOLIC PNL TOTAL CA: CPT

## 2024-01-01 PROCEDURE — 82962 GLUCOSE BLOOD TEST: CPT

## 2024-01-01 PROCEDURE — 82653 EL-1 FECAL QUANTITATIVE: CPT

## 2024-01-01 PROCEDURE — 93010 ELECTROCARDIOGRAM REPORT: CPT

## 2024-01-01 PROCEDURE — 93970 EXTREMITY STUDY: CPT

## 2024-01-01 PROCEDURE — 87015 SPECIMEN INFECT AGNT CONCNTJ: CPT

## 2024-01-01 PROCEDURE — A9579: CPT

## 2024-01-01 PROCEDURE — 82705 FATS/LIPIDS FECES QUAL: CPT

## 2024-01-01 PROCEDURE — 82803 BLOOD GASES ANY COMBINATION: CPT

## 2024-01-01 PROCEDURE — 76942 ECHO GUIDE FOR BIOPSY: CPT

## 2024-01-01 PROCEDURE — 74183 MRI ABD W/O CNTR FLWD CNTR: CPT | Mod: 26

## 2024-01-01 PROCEDURE — C1889: CPT

## 2024-01-01 PROCEDURE — 0225U NFCT DS DNA&RNA 21 SARSCOV2: CPT

## 2024-01-01 PROCEDURE — 71045 X-RAY EXAM CHEST 1 VIEW: CPT

## 2024-01-01 PROCEDURE — 81001 URINALYSIS AUTO W/SCOPE: CPT

## 2024-01-01 PROCEDURE — 85610 PROTHROMBIN TIME: CPT

## 2024-01-01 PROCEDURE — 76942 ECHO GUIDE FOR BIOPSY: CPT | Mod: 26

## 2024-01-01 PROCEDURE — 97530 THERAPEUTIC ACTIVITIES: CPT | Mod: GP

## 2024-01-01 PROCEDURE — 86381 MITOCHONDRIAL ANTIBODY EACH: CPT

## 2024-01-01 PROCEDURE — 83735 ASSAY OF MAGNESIUM: CPT

## 2024-01-01 PROCEDURE — 87389 HIV-1 AG W/HIV-1&-2 AB AG IA: CPT

## 2024-01-01 PROCEDURE — 88173 CYTOPATH EVAL FNA REPORT: CPT | Mod: 26

## 2024-01-01 PROCEDURE — 93970 EXTREMITY STUDY: CPT | Mod: 26

## 2024-01-01 PROCEDURE — 86301 IMMUNOASSAY TUMOR CA 19-9: CPT

## 2024-01-01 PROCEDURE — 88360 TUMOR IMMUNOHISTOCHEM/MANUAL: CPT

## 2024-01-01 PROCEDURE — 85027 COMPLETE CBC AUTOMATED: CPT

## 2024-01-01 PROCEDURE — 93306 TTE W/DOPPLER COMPLETE: CPT

## 2024-01-01 PROCEDURE — 82105 ALPHA-FETOPROTEIN SERUM: CPT

## 2024-01-01 PROCEDURE — 86038 ANTINUCLEAR ANTIBODIES: CPT

## 2024-01-01 PROCEDURE — 88173 CYTOPATH EVAL FNA REPORT: CPT

## 2024-01-01 PROCEDURE — 76937 US GUIDE VASCULAR ACCESS: CPT | Mod: 26

## 2024-01-01 PROCEDURE — 97116 GAIT TRAINING THERAPY: CPT | Mod: GP

## 2024-01-01 PROCEDURE — 76705 ECHO EXAM OF ABDOMEN: CPT | Mod: 26

## 2024-01-01 PROCEDURE — 86255 FLUORESCENT ANTIBODY SCREEN: CPT

## 2024-01-01 PROCEDURE — 87640 STAPH A DNA AMP PROBE: CPT

## 2024-01-01 PROCEDURE — 99152 MOD SED SAME PHYS/QHP 5/>YRS: CPT

## 2024-01-01 PROCEDURE — 47000 NEEDLE BIOPSY OF LIVER PERQ: CPT

## 2024-01-01 PROCEDURE — 83993 ASSAY FOR CALPROTECTIN FECAL: CPT

## 2024-01-01 PROCEDURE — 86901 BLOOD TYPING SEROLOGIC RH(D): CPT

## 2024-01-01 PROCEDURE — 97162 PT EVAL MOD COMPLEX 30 MIN: CPT | Mod: GP

## 2024-01-01 PROCEDURE — 85730 THROMBOPLASTIN TIME PARTIAL: CPT

## 2024-01-01 PROCEDURE — 80053 COMPREHEN METABOLIC PANEL: CPT

## 2024-01-01 PROCEDURE — 93005 ELECTROCARDIOGRAM TRACING: CPT

## 2024-01-01 PROCEDURE — 83631 LACTOFERRIN FECAL (QUANT): CPT

## 2024-01-01 PROCEDURE — 83521 IG LIGHT CHAINS FREE EACH: CPT

## 2024-01-01 PROCEDURE — 99285 EMERGENCY DEPT VISIT HI MDM: CPT

## 2024-01-01 PROCEDURE — 83605 ASSAY OF LACTIC ACID: CPT

## 2024-01-01 PROCEDURE — 85379 FIBRIN DEGRADATION QUANT: CPT

## 2024-01-01 PROCEDURE — 80061 LIPID PANEL: CPT

## 2024-01-01 PROCEDURE — 86850 RBC ANTIBODY SCREEN: CPT

## 2024-01-01 PROCEDURE — 88341 IMHCHEM/IMCYTCHM EA ADD ANTB: CPT | Mod: 26

## 2024-01-01 PROCEDURE — 88342 IMHCHEM/IMCYTCHM 1ST ANTB: CPT

## 2024-01-01 PROCEDURE — 87116 MYCOBACTERIA CULTURE: CPT

## 2024-01-01 PROCEDURE — 88342 IMHCHEM/IMCYTCHM 1ST ANTB: CPT | Mod: 26

## 2024-01-01 PROCEDURE — 99239 HOSP IP/OBS DSCHRG MGMT >30: CPT

## 2024-01-01 PROCEDURE — 82941 ASSAY OF GASTRIN: CPT

## 2024-01-01 PROCEDURE — 83036 HEMOGLOBIN GLYCOSYLATED A1C: CPT

## 2024-01-01 PROCEDURE — 36415 COLL VENOUS BLD VENIPUNCTURE: CPT

## 2024-01-01 PROCEDURE — 87324 CLOSTRIDIUM AG IA: CPT

## 2024-01-01 PROCEDURE — 71275 CT ANGIOGRAPHY CHEST: CPT | Mod: MC

## 2024-01-01 PROCEDURE — 84443 ASSAY THYROID STIM HORMONE: CPT

## 2024-01-01 PROCEDURE — 94002 VENT MGMT INPAT INIT DAY: CPT

## 2024-01-01 PROCEDURE — 85025 COMPLETE CBC W/AUTO DIFF WBC: CPT

## 2024-01-01 PROCEDURE — 86376 MICROSOMAL ANTIBODY EACH: CPT

## 2024-01-01 PROCEDURE — 87206 SMEAR FLUORESCENT/ACID STAI: CPT

## 2024-01-01 PROCEDURE — 84100 ASSAY OF PHOSPHORUS: CPT

## 2024-01-01 PROCEDURE — 87507 IADNA-DNA/RNA PROBE TQ 12-25: CPT

## 2024-01-01 PROCEDURE — 36000 PLACE NEEDLE IN VEIN: CPT

## 2024-01-01 PROCEDURE — 87086 URINE CULTURE/COLONY COUNT: CPT

## 2024-01-01 PROCEDURE — 88341 IMHCHEM/IMCYTCHM EA ADD ANTB: CPT

## 2024-01-01 PROCEDURE — 86900 BLOOD TYPING SEROLOGIC ABO: CPT

## 2024-01-01 RX ORDER — FUROSEMIDE 10 MG/ML
40 INJECTION INTRAVENOUS ONCE
Refills: 0 | Status: COMPLETED | OUTPATIENT
Start: 2024-01-01 | End: 2024-01-01

## 2024-01-01 RX ORDER — FUROSEMIDE 10 MG/ML
40 INJECTION INTRAVENOUS DAILY
Refills: 0 | Status: COMPLETED | OUTPATIENT
Start: 2024-01-01 | End: 2024-01-01

## 2024-01-01 RX ORDER — METOPROLOL TARTRATE 50 MG
25 TABLET ORAL DAILY
Refills: 0 | Status: DISCONTINUED | OUTPATIENT
Start: 2024-01-01 | End: 2024-01-01

## 2024-01-01 RX ORDER — METHYLPREDNISOLONE ACETATE 80 MG/ML
60 INJECTION, SUSPENSION INTRA-ARTICULAR; INTRALESIONAL; INTRAMUSCULAR; SOFT TISSUE
Refills: 0 | Status: DISCONTINUED | OUTPATIENT
Start: 2024-01-01 | End: 2024-01-01

## 2024-01-01 RX ORDER — PIPERACILLIN SODIUM AND TAZOBACTAM SODIUM 12; 1.5 G/60ML; G/60ML
3.38 INJECTION, POWDER, LYOPHILIZED, FOR SOLUTION INTRAVENOUS ONCE
Refills: 0 | Status: COMPLETED | OUTPATIENT
Start: 2024-01-01 | End: 2024-01-01

## 2024-01-01 RX ORDER — SODIUM ZIRCONIUM CYCLOSILICATE 10 G/10G
5 POWDER, FOR SUSPENSION ORAL ONCE
Refills: 0 | Status: COMPLETED | OUTPATIENT
Start: 2024-01-01 | End: 2024-01-01

## 2024-01-01 RX ORDER — SODIUM CHLORIDE 0.9 % (FLUSH) 0.9 %
500 SYRINGE (ML) INJECTION ONCE
Refills: 0 | Status: COMPLETED | OUTPATIENT
Start: 2024-01-01 | End: 2024-01-01

## 2024-01-01 RX ORDER — MAGNESIUM SULFATE 500 MG/ML
2 VIAL (ML) INJECTION ONCE
Refills: 0 | Status: COMPLETED | OUTPATIENT
Start: 2024-01-01 | End: 2024-01-01

## 2024-01-01 RX ORDER — AMLODIPINE BESYLATE 5 MG
5 TABLET ORAL DAILY
Refills: 0 | Status: DISCONTINUED | OUTPATIENT
Start: 2024-01-01 | End: 2024-01-01

## 2024-01-01 RX ORDER — ATORVASTATIN CALCIUM 10 MG/1
20 TABLET, FILM COATED ORAL AT BEDTIME
Refills: 0 | Status: DISCONTINUED | OUTPATIENT
Start: 2024-01-01 | End: 2024-01-01

## 2024-01-01 RX ORDER — CEFEPIME 2 G/1
2000 INJECTION, POWDER, FOR SOLUTION INTRAVENOUS EVERY 12 HOURS
Refills: 0 | Status: DISCONTINUED | OUTPATIENT
Start: 2024-01-01 | End: 2024-01-01

## 2024-01-01 RX ORDER — MORPHINE SULFATE 30 MG/1
4 TABLET, FILM COATED, EXTENDED RELEASE ORAL ONCE
Refills: 0 | Status: DISCONTINUED | OUTPATIENT
Start: 2024-01-01 | End: 2024-01-01

## 2024-01-01 RX ORDER — ZOLPIDEM TARTRATE 5 MG
0 TABLET ORAL
Refills: 0 | DISCHARGE

## 2024-01-01 RX ORDER — OXYCODONE HYDROCHLORIDE 30 MG/1
5 TABLET, FILM COATED, EXTENDED RELEASE ORAL EVERY 6 HOURS
Refills: 0 | Status: DISCONTINUED | OUTPATIENT
Start: 2024-01-01 | End: 2024-01-01

## 2024-01-01 RX ORDER — ACETAMINOPHEN 325 MG
650 TABLET ORAL EVERY 6 HOURS
Refills: 0 | Status: DISCONTINUED | OUTPATIENT
Start: 2024-01-01 | End: 2024-01-01

## 2024-01-01 RX ORDER — ONDANSETRON HCL/PF 4 MG/2 ML
16 VIAL (ML) INJECTION ONCE
Refills: 0 | Status: COMPLETED | OUTPATIENT
Start: 2024-01-01 | End: 2024-01-01

## 2024-01-01 RX ORDER — METOPROLOL TARTRATE 50 MG
1 TABLET ORAL
Refills: 0 | DISCHARGE

## 2024-01-01 RX ORDER — GEMCITABINE HYDROCHLORIDE 1 G/26.3ML
1100 INJECTION INTRAVENOUS ONCE
Refills: 0 | Status: COMPLETED | OUTPATIENT
Start: 2024-01-01 | End: 2024-01-01

## 2024-01-01 RX ORDER — ATORVASTATIN CALCIUM 10 MG/1
1 TABLET, FILM COATED ORAL
Refills: 0 | DISCHARGE

## 2024-01-01 RX ORDER — SODIUM CHLORIDE IRRIG SOLUTION 0.9 %
1000 SOLUTION, IRRIGATION IRRIGATION
Refills: 0 | Status: DISCONTINUED | OUTPATIENT
Start: 2024-01-01 | End: 2024-01-01

## 2024-01-01 RX ORDER — SENNOSIDES 8.6 MG
2 TABLET ORAL AT BEDTIME
Refills: 0 | Status: DISCONTINUED | OUTPATIENT
Start: 2024-01-01 | End: 2024-01-01

## 2024-01-01 RX ORDER — DRONABINOL 5 MG/1
2.5 CAPSULE ORAL THREE TIMES A DAY
Refills: 0 | Status: DISCONTINUED | OUTPATIENT
Start: 2024-01-01 | End: 2024-01-01

## 2024-01-01 RX ORDER — CARBOPLATIN 450 MG/45ML
320 INJECTION, SOLUTION INTRAVENOUS ONCE
Refills: 0 | Status: COMPLETED | OUTPATIENT
Start: 2024-01-01 | End: 2024-01-01

## 2024-01-01 RX ORDER — CHLORHEXIDINE GLUCONATE ORAL RINSE 1.2 MG/ML
1 SOLUTION DENTAL DAILY
Refills: 0 | Status: DISCONTINUED | OUTPATIENT
Start: 2024-01-01 | End: 2024-01-01

## 2024-01-01 RX ORDER — CEFTRIAXONE SODIUM 1 G
1000 VIAL (EA) INJECTION EVERY 24 HOURS
Refills: 0 | Status: DISCONTINUED | OUTPATIENT
Start: 2024-01-01 | End: 2024-01-01

## 2024-01-01 RX ORDER — TAMSULOSIN HCL 0.4 MG
0.4 CAPSULE ORAL DAILY
Refills: 0 | Status: DISCONTINUED | OUTPATIENT
Start: 2024-01-01 | End: 2024-01-01

## 2024-01-01 RX ORDER — CHLORHEXIDINE GLUCONATE ORAL RINSE 1.2 MG/ML
15 SOLUTION DENTAL EVERY 12 HOURS
Refills: 0 | Status: DISCONTINUED | OUTPATIENT
Start: 2024-01-01 | End: 2024-01-01

## 2024-01-01 RX ORDER — CEFEPIME 2 G/1
2000 INJECTION, POWDER, FOR SOLUTION INTRAVENOUS ONCE
Refills: 0 | Status: COMPLETED | OUTPATIENT
Start: 2024-01-01 | End: 2024-01-01

## 2024-01-01 RX ORDER — FOSAPREPITANT 150 MG/5ML
150 INJECTION, POWDER, LYOPHILIZED, FOR SOLUTION INTRAVENOUS ONCE
Refills: 0 | Status: COMPLETED | OUTPATIENT
Start: 2024-01-01 | End: 2024-01-01

## 2024-01-01 RX ORDER — BENAZEPRIL HYDROCHLORIDE 40 MG/1
1 TABLET ORAL
Refills: 0 | DISCHARGE

## 2024-01-01 RX ORDER — ASPIRIN 325 MG
81 TABLET ORAL
Refills: 0 | DISCHARGE

## 2024-01-01 RX ORDER — AMLODIPINE BESYLATE 5 MG
1 TABLET ORAL
Refills: 0 | DISCHARGE

## 2024-01-01 RX ORDER — LACTOBACILLUS ACIDOPHILUS 25MM CELL
1 CAPSULE ORAL DAILY
Refills: 0 | Status: DISCONTINUED | OUTPATIENT
Start: 2024-01-01 | End: 2024-01-01

## 2024-01-01 RX ORDER — LOPERAMIDE HYDROCHLORIDE 2 MG/1
2 CAPSULE ORAL EVERY 6 HOURS
Refills: 0 | Status: DISCONTINUED | OUTPATIENT
Start: 2024-01-01 | End: 2024-01-01

## 2024-01-01 RX ORDER — MULTI VITAMIN/MINERAL SUPPLEMENT WITH ASCORBIC ACID, NIACIN, PYRIDOXINE, PANTOTHENIC ACID, FOLIC ACID, RIBOFLAVIN, THIAMIN, BIOTIN, COBALAMIN AND ZINC. 60; 20; 12.5; 10; 10; 1.7; 1.5; 1; .3; .006 MG/1; MG/1; MG/1; MG/1; MG/1; MG/1; MG/1; MG/1; MG/1; MG/1
0 TABLET, COATED ORAL
Refills: 0 | DISCHARGE

## 2024-01-01 RX ORDER — NOREPINEPHRINE BITARTRATE/D5W 16MG/250ML
0.05 PLASTIC BAG, INJECTION (ML) INTRAVENOUS
Qty: 8 | Refills: 0 | Status: DISCONTINUED | OUTPATIENT
Start: 2024-01-01 | End: 2024-01-01

## 2024-01-01 RX ORDER — SODIUM CHLORIDE IRRIG SOLUTION 0.9 %
1000 SOLUTION, IRRIGATION IRRIGATION ONCE
Refills: 0 | Status: COMPLETED | OUTPATIENT
Start: 2024-01-01 | End: 2024-01-01

## 2024-01-01 RX ORDER — PANTOPRAZOLE SODIUM 40 MG/1
40 TABLET, DELAYED RELEASE ORAL
Refills: 0 | Status: DISCONTINUED | OUTPATIENT
Start: 2024-01-01 | End: 2024-01-01

## 2024-01-01 RX ORDER — ONDANSETRON HCL/PF 4 MG/2 ML
4 VIAL (ML) INJECTION ONCE
Refills: 0 | Status: COMPLETED | OUTPATIENT
Start: 2024-01-01 | End: 2024-01-01

## 2024-01-01 RX ORDER — LOPERAMIDE HYDROCHLORIDE 2 MG/1
2 CAPSULE ORAL
Refills: 0 | Status: DISCONTINUED | OUTPATIENT
Start: 2024-01-01 | End: 2024-01-01

## 2024-01-01 RX ORDER — CEFEPIME 2 G/1
INJECTION, POWDER, FOR SOLUTION INTRAVENOUS
Refills: 0 | Status: DISCONTINUED | OUTPATIENT
Start: 2024-01-01 | End: 2024-01-01

## 2024-01-01 RX ORDER — FENTANYL CITRATE-0.9 % NACL/PF 300MCG/30
1 PATIENT CONTROLLED ANALGESIA VIAL INJECTION
Refills: 0 | DISCHARGE

## 2024-01-01 RX ORDER — AMLODIPINE BESYLATE 5 MG
10 TABLET ORAL DAILY
Refills: 0 | Status: DISCONTINUED | OUTPATIENT
Start: 2024-01-01 | End: 2024-01-01

## 2024-01-01 RX ORDER — MORPHINE SULFATE 30 MG/1
1 TABLET, FILM COATED, EXTENDED RELEASE ORAL ONCE
Refills: 0 | Status: DISCONTINUED | OUTPATIENT
Start: 2024-01-01 | End: 2024-01-01

## 2024-01-01 RX ORDER — DRONABINOL 5 MG/1
2.5 CAPSULE ORAL
Refills: 0 | Status: DISCONTINUED | OUTPATIENT
Start: 2024-01-01 | End: 2024-01-01

## 2024-01-01 RX ADMIN — FUROSEMIDE 40 MILLIGRAM(S): 10 INJECTION INTRAVENOUS at 05:42

## 2024-01-01 RX ADMIN — OXYCODONE HYDROCHLORIDE 5 MILLIGRAM(S): 30 TABLET, FILM COATED, EXTENDED RELEASE ORAL at 21:30

## 2024-01-01 RX ADMIN — LOPERAMIDE HYDROCHLORIDE 2 MILLIGRAM(S): 2 CAPSULE ORAL at 11:50

## 2024-01-01 RX ADMIN — LOPERAMIDE HYDROCHLORIDE 2 MILLIGRAM(S): 2 CAPSULE ORAL at 18:42

## 2024-01-01 RX ADMIN — LOPERAMIDE HYDROCHLORIDE 2 MILLIGRAM(S): 2 CAPSULE ORAL at 23:01

## 2024-01-01 RX ADMIN — Medication 1 TABLET(S): at 11:55

## 2024-01-01 RX ADMIN — Medication 500 MILLILITER(S): at 18:09

## 2024-01-01 RX ADMIN — Medication 5000 UNIT(S): at 17:15

## 2024-01-01 RX ADMIN — Medication 102 MILLIGRAM(S): at 11:28

## 2024-01-01 RX ADMIN — Medication 0.4 MILLIGRAM(S): at 11:36

## 2024-01-01 RX ADMIN — OXYCODONE HYDROCHLORIDE 5 MILLIGRAM(S): 30 TABLET, FILM COATED, EXTENDED RELEASE ORAL at 18:54

## 2024-01-01 RX ADMIN — Medication 650 MILLIGRAM(S): at 12:17

## 2024-01-01 RX ADMIN — CHLORHEXIDINE GLUCONATE ORAL RINSE 1 APPLICATION(S): 1.2 SOLUTION DENTAL at 13:31

## 2024-01-01 RX ADMIN — CHLORHEXIDINE GLUCONATE ORAL RINSE 1 APPLICATION(S): 1.2 SOLUTION DENTAL at 12:39

## 2024-01-01 RX ADMIN — SODIUM ZIRCONIUM CYCLOSILICATE 5 GRAM(S): 10 POWDER, FOR SUSPENSION ORAL at 08:09

## 2024-01-01 RX ADMIN — CHLORHEXIDINE GLUCONATE ORAL RINSE 1 APPLICATION(S): 1.2 SOLUTION DENTAL at 12:20

## 2024-01-01 RX ADMIN — Medication 0.4 MILLIGRAM(S): at 11:30

## 2024-01-01 RX ADMIN — Medication 5000 UNIT(S): at 05:00

## 2024-01-01 RX ADMIN — OXYCODONE HYDROCHLORIDE 5 MILLIGRAM(S): 30 TABLET, FILM COATED, EXTENDED RELEASE ORAL at 13:00

## 2024-01-01 RX ADMIN — Medication 100 MILLIGRAM(S): at 05:05

## 2024-01-01 RX ADMIN — OXYCODONE HYDROCHLORIDE 5 MILLIGRAM(S): 30 TABLET, FILM COATED, EXTENDED RELEASE ORAL at 17:47

## 2024-01-01 RX ADMIN — Medication 100 MILLIGRAM(S): at 06:01

## 2024-01-01 RX ADMIN — OXYCODONE HYDROCHLORIDE 5 MILLIGRAM(S): 30 TABLET, FILM COATED, EXTENDED RELEASE ORAL at 19:51

## 2024-01-01 RX ADMIN — LOPERAMIDE HYDROCHLORIDE 2 MILLIGRAM(S): 2 CAPSULE ORAL at 09:46

## 2024-01-01 RX ADMIN — CEFEPIME 100 MILLIGRAM(S): 2 INJECTION, POWDER, FOR SOLUTION INTRAVENOUS at 11:00

## 2024-01-01 RX ADMIN — CHLORHEXIDINE GLUCONATE ORAL RINSE 1 APPLICATION(S): 1.2 SOLUTION DENTAL at 11:32

## 2024-01-01 RX ADMIN — Medication 25 GRAM(S): at 11:00

## 2024-01-01 RX ADMIN — PANTOPRAZOLE SODIUM 40 MILLIGRAM(S): 40 TABLET, DELAYED RELEASE ORAL at 05:06

## 2024-01-01 RX ADMIN — CEFEPIME 100 MILLIGRAM(S): 2 INJECTION, POWDER, FOR SOLUTION INTRAVENOUS at 17:14

## 2024-01-01 RX ADMIN — LOPERAMIDE HYDROCHLORIDE 2 MILLIGRAM(S): 2 CAPSULE ORAL at 05:25

## 2024-01-01 RX ADMIN — Medication 650 MILLIGRAM(S): at 21:32

## 2024-01-01 RX ADMIN — DRONABINOL 2.5 MILLIGRAM(S): 5 CAPSULE ORAL at 13:39

## 2024-01-01 RX ADMIN — Medication 1 TABLET(S): at 12:28

## 2024-01-01 RX ADMIN — Medication 1 TABLET(S): at 11:50

## 2024-01-01 RX ADMIN — Medication 150 MILLILITER(S): at 04:42

## 2024-01-01 RX ADMIN — OXYCODONE HYDROCHLORIDE 5 MILLIGRAM(S): 30 TABLET, FILM COATED, EXTENDED RELEASE ORAL at 19:10

## 2024-01-01 RX ADMIN — Medication 5000 UNIT(S): at 05:50

## 2024-01-01 RX ADMIN — OXYCODONE HYDROCHLORIDE 5 MILLIGRAM(S): 30 TABLET, FILM COATED, EXTENDED RELEASE ORAL at 21:58

## 2024-01-01 RX ADMIN — OXYCODONE HYDROCHLORIDE 5 MILLIGRAM(S): 30 TABLET, FILM COATED, EXTENDED RELEASE ORAL at 18:14

## 2024-01-01 RX ADMIN — Medication 25 MILLIGRAM(S): at 05:18

## 2024-01-01 RX ADMIN — Medication 1 TABLET(S): at 12:49

## 2024-01-01 RX ADMIN — OXYCODONE HYDROCHLORIDE 5 MILLIGRAM(S): 30 TABLET, FILM COATED, EXTENDED RELEASE ORAL at 13:20

## 2024-01-01 RX ADMIN — Medication 1 TABLET(S): at 11:36

## 2024-01-01 RX ADMIN — Medication 5000 UNIT(S): at 18:42

## 2024-01-01 RX ADMIN — Medication 650 MILLIGRAM(S): at 11:21

## 2024-01-01 RX ADMIN — DRONABINOL 2.5 MILLIGRAM(S): 5 CAPSULE ORAL at 17:21

## 2024-01-01 RX ADMIN — Medication 100 MILLILITER(S): at 01:05

## 2024-01-01 RX ADMIN — OXYCODONE HYDROCHLORIDE 5 MILLIGRAM(S): 30 TABLET, FILM COATED, EXTENDED RELEASE ORAL at 16:40

## 2024-01-01 RX ADMIN — Medication 150 MILLILITER(S): at 22:33

## 2024-01-01 RX ADMIN — Medication 10 MILLIGRAM(S): at 05:58

## 2024-01-01 RX ADMIN — CHLORHEXIDINE GLUCONATE ORAL RINSE 1 APPLICATION(S): 1.2 SOLUTION DENTAL at 12:30

## 2024-01-01 RX ADMIN — Medication 150 MILLILITER(S): at 05:08

## 2024-01-01 RX ADMIN — OXYCODONE HYDROCHLORIDE 5 MILLIGRAM(S): 30 TABLET, FILM COATED, EXTENDED RELEASE ORAL at 06:12

## 2024-01-01 RX ADMIN — Medication 650 MILLIGRAM(S): at 18:09

## 2024-01-01 RX ADMIN — CHLORHEXIDINE GLUCONATE ORAL RINSE 1 APPLICATION(S): 1.2 SOLUTION DENTAL at 12:13

## 2024-01-01 RX ADMIN — Medication 5000 UNIT(S): at 17:37

## 2024-01-01 RX ADMIN — OXYCODONE HYDROCHLORIDE 5 MILLIGRAM(S): 30 TABLET, FILM COATED, EXTENDED RELEASE ORAL at 06:34

## 2024-01-01 RX ADMIN — Medication 5000 UNIT(S): at 06:04

## 2024-01-01 RX ADMIN — Medication 5000 UNIT(S): at 17:44

## 2024-01-01 RX ADMIN — Medication 650 MILLIGRAM(S): at 06:38

## 2024-01-01 RX ADMIN — Medication 1 TABLET(S): at 12:18

## 2024-01-01 RX ADMIN — Medication 1 TABLET(S): at 11:10

## 2024-01-01 RX ADMIN — DRONABINOL 2.5 MILLIGRAM(S): 5 CAPSULE ORAL at 05:50

## 2024-01-01 RX ADMIN — Medication 1 TABLET(S): at 12:19

## 2024-01-01 RX ADMIN — Medication 5000 UNIT(S): at 05:07

## 2024-01-01 RX ADMIN — Medication 100 MILLILITER(S): at 05:36

## 2024-01-01 RX ADMIN — Medication 0.4 MILLIGRAM(S): at 11:10

## 2024-01-01 RX ADMIN — Medication 650 MILLIGRAM(S): at 08:08

## 2024-01-01 RX ADMIN — DRONABINOL 2.5 MILLIGRAM(S): 5 CAPSULE ORAL at 05:28

## 2024-01-01 RX ADMIN — OXYCODONE HYDROCHLORIDE 5 MILLIGRAM(S): 30 TABLET, FILM COATED, EXTENDED RELEASE ORAL at 11:37

## 2024-01-01 RX ADMIN — Medication 1 TABLET(S): at 11:37

## 2024-01-01 RX ADMIN — GEMCITABINE HYDROCHLORIDE 1100 MILLIGRAM(S): 1 INJECTION INTRAVENOUS at 13:02

## 2024-01-01 RX ADMIN — Medication 25 MILLIGRAM(S): at 05:20

## 2024-01-01 RX ADMIN — Medication 1100 UNIT(S)/HR: at 21:30

## 2024-01-01 RX ADMIN — CHLORHEXIDINE GLUCONATE ORAL RINSE 1 APPLICATION(S): 1.2 SOLUTION DENTAL at 12:37

## 2024-01-01 RX ADMIN — DRONABINOL 2.5 MILLIGRAM(S): 5 CAPSULE ORAL at 18:04

## 2024-01-01 RX ADMIN — Medication 650 MILLIGRAM(S): at 12:27

## 2024-01-01 RX ADMIN — OXYCODONE HYDROCHLORIDE 5 MILLIGRAM(S): 30 TABLET, FILM COATED, EXTENDED RELEASE ORAL at 22:15

## 2024-01-01 RX ADMIN — Medication 0.4 MILLIGRAM(S): at 11:08

## 2024-01-01 RX ADMIN — Medication 1 TABLET(S): at 12:11

## 2024-01-01 RX ADMIN — Medication 2 TABLET(S): at 21:35

## 2024-01-01 RX ADMIN — Medication 5000 UNIT(S): at 05:30

## 2024-01-01 RX ADMIN — OXYCODONE HYDROCHLORIDE 5 MILLIGRAM(S): 30 TABLET, FILM COATED, EXTENDED RELEASE ORAL at 12:37

## 2024-01-01 RX ADMIN — Medication 650 MILLIGRAM(S): at 11:54

## 2024-01-01 RX ADMIN — OXYCODONE HYDROCHLORIDE 5 MILLIGRAM(S): 30 TABLET, FILM COATED, EXTENDED RELEASE ORAL at 10:45

## 2024-01-01 RX ADMIN — Medication 5 MILLIGRAM(S): at 05:18

## 2024-01-01 RX ADMIN — Medication 4 MILLIGRAM(S): at 12:15

## 2024-01-01 RX ADMIN — Medication 25 MILLIGRAM(S): at 05:02

## 2024-01-01 RX ADMIN — LOPERAMIDE HYDROCHLORIDE 2 MILLIGRAM(S): 2 CAPSULE ORAL at 05:00

## 2024-01-01 RX ADMIN — OXYCODONE HYDROCHLORIDE 5 MILLIGRAM(S): 30 TABLET, FILM COATED, EXTENDED RELEASE ORAL at 06:47

## 2024-01-01 RX ADMIN — OXYCODONE HYDROCHLORIDE 5 MILLIGRAM(S): 30 TABLET, FILM COATED, EXTENDED RELEASE ORAL at 22:37

## 2024-01-01 RX ADMIN — Medication 5000 UNIT(S): at 17:38

## 2024-01-01 RX ADMIN — Medication 25 GRAM(S): at 09:48

## 2024-01-01 RX ADMIN — Medication 2 TABLET(S): at 22:28

## 2024-01-01 RX ADMIN — Medication 100 MILLILITER(S): at 03:30

## 2024-01-01 RX ADMIN — Medication 116 MILLIGRAM(S): at 11:27

## 2024-01-01 RX ADMIN — Medication 650 MILLIGRAM(S): at 19:49

## 2024-01-01 RX ADMIN — Medication 5000 UNIT(S): at 06:16

## 2024-01-01 RX ADMIN — Medication 5000 UNIT(S): at 17:18

## 2024-01-01 RX ADMIN — Medication 100 MILLILITER(S): at 07:32

## 2024-01-01 RX ADMIN — Medication 40 MILLIEQUIVALENT(S): at 10:25

## 2024-01-01 RX ADMIN — Medication 17 GRAM(S): at 12:26

## 2024-01-01 RX ADMIN — Medication 5000 UNIT(S): at 17:22

## 2024-01-01 RX ADMIN — Medication 5000 UNIT(S): at 21:24

## 2024-01-01 RX ADMIN — OXYCODONE HYDROCHLORIDE 5 MILLIGRAM(S): 30 TABLET, FILM COATED, EXTENDED RELEASE ORAL at 18:40

## 2024-01-01 RX ADMIN — Medication 1000 MILLILITER(S): at 13:20

## 2024-01-01 RX ADMIN — FUROSEMIDE 40 MILLIGRAM(S): 10 INJECTION INTRAVENOUS at 06:05

## 2024-01-01 RX ADMIN — OXYCODONE HYDROCHLORIDE 5 MILLIGRAM(S): 30 TABLET, FILM COATED, EXTENDED RELEASE ORAL at 12:02

## 2024-01-01 RX ADMIN — Medication 1 TABLET(S): at 11:30

## 2024-01-01 RX ADMIN — Medication 1000 MILLILITER(S): at 12:14

## 2024-01-01 RX ADMIN — Medication 5000 UNIT(S): at 05:06

## 2024-01-01 RX ADMIN — Medication 650 MILLIGRAM(S): at 19:31

## 2024-01-01 RX ADMIN — METHYLPREDNISOLONE ACETATE 60 MILLIGRAM(S): 80 INJECTION, SUSPENSION INTRA-ARTICULAR; INTRALESIONAL; INTRAMUSCULAR; SOFT TISSUE at 08:29

## 2024-01-01 RX ADMIN — MORPHINE SULFATE 1 MILLIGRAM(S): 30 TABLET, FILM COATED, EXTENDED RELEASE ORAL at 06:38

## 2024-01-01 RX ADMIN — Medication 17 GRAM(S): at 12:12

## 2024-01-01 RX ADMIN — OXYCODONE HYDROCHLORIDE 5 MILLIGRAM(S): 30 TABLET, FILM COATED, EXTENDED RELEASE ORAL at 17:50

## 2024-01-01 RX ADMIN — OXYCODONE HYDROCHLORIDE 5 MILLIGRAM(S): 30 TABLET, FILM COATED, EXTENDED RELEASE ORAL at 18:00

## 2024-01-01 RX ADMIN — Medication 0.4 MILLIGRAM(S): at 12:27

## 2024-01-01 RX ADMIN — Medication 2 TABLET(S): at 21:45

## 2024-01-01 RX ADMIN — OXYCODONE HYDROCHLORIDE 5 MILLIGRAM(S): 30 TABLET, FILM COATED, EXTENDED RELEASE ORAL at 10:46

## 2024-01-01 RX ADMIN — DRONABINOL 2.5 MILLIGRAM(S): 5 CAPSULE ORAL at 10:25

## 2024-01-01 RX ADMIN — Medication 25 MILLIGRAM(S): at 05:25

## 2024-01-01 RX ADMIN — Medication 25 MILLIGRAM(S): at 05:07

## 2024-01-01 RX ADMIN — Medication 100 MILLIGRAM(S): at 06:04

## 2024-01-01 RX ADMIN — ATORVASTATIN CALCIUM 20 MILLIGRAM(S): 10 TABLET, FILM COATED ORAL at 22:33

## 2024-01-01 RX ADMIN — OXYCODONE HYDROCHLORIDE 5 MILLIGRAM(S): 30 TABLET, FILM COATED, EXTENDED RELEASE ORAL at 09:59

## 2024-01-01 RX ADMIN — CHLORHEXIDINE GLUCONATE ORAL RINSE 1 APPLICATION(S): 1.2 SOLUTION DENTAL at 11:12

## 2024-01-01 RX ADMIN — OXYCODONE HYDROCHLORIDE 5 MILLIGRAM(S): 30 TABLET, FILM COATED, EXTENDED RELEASE ORAL at 12:22

## 2024-01-01 RX ADMIN — Medication 17 GRAM(S): at 12:08

## 2024-01-01 RX ADMIN — FUROSEMIDE 40 MILLIGRAM(S): 10 INJECTION INTRAVENOUS at 06:17

## 2024-01-01 RX ADMIN — OXYCODONE HYDROCHLORIDE 5 MILLIGRAM(S): 30 TABLET, FILM COATED, EXTENDED RELEASE ORAL at 09:46

## 2024-01-01 RX ADMIN — METHYLPREDNISOLONE ACETATE 60 MILLIGRAM(S): 80 INJECTION, SUSPENSION INTRA-ARTICULAR; INTRALESIONAL; INTRAMUSCULAR; SOFT TISSUE at 17:51

## 2024-01-01 RX ADMIN — LOPERAMIDE HYDROCHLORIDE 2 MILLIGRAM(S): 2 CAPSULE ORAL at 23:39

## 2024-01-01 RX ADMIN — OXYCODONE HYDROCHLORIDE 5 MILLIGRAM(S): 30 TABLET, FILM COATED, EXTENDED RELEASE ORAL at 19:40

## 2024-01-01 RX ADMIN — OXYCODONE HYDROCHLORIDE 5 MILLIGRAM(S): 30 TABLET, FILM COATED, EXTENDED RELEASE ORAL at 14:25

## 2024-01-01 RX ADMIN — Medication 100 MILLILITER(S): at 10:42

## 2024-01-01 RX ADMIN — CHLORHEXIDINE GLUCONATE ORAL RINSE 1 APPLICATION(S): 1.2 SOLUTION DENTAL at 11:39

## 2024-01-01 RX ADMIN — Medication 5000 UNIT(S): at 18:40

## 2024-01-01 RX ADMIN — MORPHINE SULFATE 4 MILLIGRAM(S): 30 TABLET, FILM COATED, EXTENDED RELEASE ORAL at 16:21

## 2024-01-01 RX ADMIN — Medication 5000 UNIT(S): at 17:14

## 2024-01-01 RX ADMIN — Medication 0.4 MILLIGRAM(S): at 11:37

## 2024-01-01 RX ADMIN — MORPHINE SULFATE 4 MILLIGRAM(S): 30 TABLET, FILM COATED, EXTENDED RELEASE ORAL at 12:15

## 2024-01-01 RX ADMIN — CHLORHEXIDINE GLUCONATE ORAL RINSE 1 APPLICATION(S): 1.2 SOLUTION DENTAL at 11:07

## 2024-01-01 RX ADMIN — CHLORHEXIDINE GLUCONATE ORAL RINSE 1 APPLICATION(S): 1.2 SOLUTION DENTAL at 11:37

## 2024-01-01 RX ADMIN — Medication 500 MILLIGRAM(S): at 21:35

## 2024-01-01 RX ADMIN — OXYCODONE HYDROCHLORIDE 5 MILLIGRAM(S): 30 TABLET, FILM COATED, EXTENDED RELEASE ORAL at 22:02

## 2024-01-01 RX ADMIN — Medication 650 MILLIGRAM(S): at 23:33

## 2024-01-01 RX ADMIN — Medication 10 MILLIGRAM(S): at 05:20

## 2024-01-01 RX ADMIN — Medication 5000 UNIT(S): at 17:21

## 2024-01-01 RX ADMIN — Medication 500 MILLIGRAM(S): at 21:47

## 2024-01-01 RX ADMIN — Medication 5 MILLIGRAM(S): at 05:25

## 2024-01-01 RX ADMIN — Medication 100 MILLILITER(S): at 22:28

## 2024-01-01 RX ADMIN — DRONABINOL 2.5 MILLIGRAM(S): 5 CAPSULE ORAL at 05:02

## 2024-01-01 RX ADMIN — Medication 17 GRAM(S): at 11:37

## 2024-01-01 RX ADMIN — Medication 1 TABLET(S): at 11:08

## 2024-01-01 RX ADMIN — OXYCODONE HYDROCHLORIDE 5 MILLIGRAM(S): 30 TABLET, FILM COATED, EXTENDED RELEASE ORAL at 08:25

## 2024-01-01 RX ADMIN — Medication 2 TABLET(S): at 21:47

## 2024-01-01 RX ADMIN — Medication 1 TABLET(S): at 11:35

## 2024-01-01 RX ADMIN — CEFEPIME 100 MILLIGRAM(S): 2 INJECTION, POWDER, FOR SOLUTION INTRAVENOUS at 05:01

## 2024-01-01 RX ADMIN — OXYCODONE HYDROCHLORIDE 5 MILLIGRAM(S): 30 TABLET, FILM COATED, EXTENDED RELEASE ORAL at 05:35

## 2024-01-01 RX ADMIN — DRONABINOL 2.5 MILLIGRAM(S): 5 CAPSULE ORAL at 21:47

## 2024-01-01 RX ADMIN — OXYCODONE HYDROCHLORIDE 5 MILLIGRAM(S): 30 TABLET, FILM COATED, EXTENDED RELEASE ORAL at 11:02

## 2024-01-01 RX ADMIN — LOPERAMIDE HYDROCHLORIDE 2 MILLIGRAM(S): 2 CAPSULE ORAL at 00:20

## 2024-01-01 RX ADMIN — Medication 25 GRAM(S): at 08:09

## 2024-01-01 RX ADMIN — FUROSEMIDE 40 MILLIGRAM(S): 10 INJECTION INTRAVENOUS at 13:00

## 2024-01-01 RX ADMIN — Medication 1 TABLET(S): at 12:13

## 2024-01-01 RX ADMIN — OXYCODONE HYDROCHLORIDE 5 MILLIGRAM(S): 30 TABLET, FILM COATED, EXTENDED RELEASE ORAL at 20:29

## 2024-01-01 RX ADMIN — Medication 5000 UNIT(S): at 17:26

## 2024-01-01 RX ADMIN — OXYCODONE HYDROCHLORIDE 5 MILLIGRAM(S): 30 TABLET, FILM COATED, EXTENDED RELEASE ORAL at 06:17

## 2024-01-01 RX ADMIN — OXYCODONE HYDROCHLORIDE 5 MILLIGRAM(S): 30 TABLET, FILM COATED, EXTENDED RELEASE ORAL at 06:14

## 2024-01-01 RX ADMIN — Medication 100 MILLIGRAM(S): at 22:33

## 2024-01-01 RX ADMIN — DRONABINOL 2.5 MILLIGRAM(S): 5 CAPSULE ORAL at 13:29

## 2024-01-01 RX ADMIN — Medication 650 MILLIGRAM(S): at 18:48

## 2024-01-01 RX ADMIN — Medication 0.4 MILLIGRAM(S): at 12:01

## 2024-01-01 RX ADMIN — OXYCODONE HYDROCHLORIDE 5 MILLIGRAM(S): 30 TABLET, FILM COATED, EXTENDED RELEASE ORAL at 12:26

## 2024-01-01 RX ADMIN — FOSAPREPITANT 500 MILLIGRAM(S): 150 INJECTION, POWDER, LYOPHILIZED, FOR SOLUTION INTRAVENOUS at 11:27

## 2024-01-01 RX ADMIN — OXYCODONE HYDROCHLORIDE 5 MILLIGRAM(S): 30 TABLET, FILM COATED, EXTENDED RELEASE ORAL at 19:11

## 2024-01-01 RX ADMIN — Medication 100 MILLIGRAM(S): at 06:08

## 2024-01-01 RX ADMIN — Medication 150 MILLILITER(S): at 15:00

## 2024-01-01 RX ADMIN — LOPERAMIDE HYDROCHLORIDE 2 MILLIGRAM(S): 2 CAPSULE ORAL at 17:46

## 2024-01-01 RX ADMIN — Medication 5000 UNIT(S): at 17:46

## 2024-01-01 RX ADMIN — OXYCODONE HYDROCHLORIDE 5 MILLIGRAM(S): 30 TABLET, FILM COATED, EXTENDED RELEASE ORAL at 15:41

## 2024-01-01 RX ADMIN — OXYCODONE HYDROCHLORIDE 5 MILLIGRAM(S): 30 TABLET, FILM COATED, EXTENDED RELEASE ORAL at 22:00

## 2024-01-01 RX ADMIN — Medication 25 MILLIGRAM(S): at 05:58

## 2024-01-01 RX ADMIN — OXYCODONE HYDROCHLORIDE 5 MILLIGRAM(S): 30 TABLET, FILM COATED, EXTENDED RELEASE ORAL at 06:04

## 2024-01-01 RX ADMIN — Medication 5000 UNIT(S): at 05:28

## 2024-01-01 RX ADMIN — LOPERAMIDE HYDROCHLORIDE 2 MILLIGRAM(S): 2 CAPSULE ORAL at 17:17

## 2024-01-01 RX ADMIN — OXYCODONE HYDROCHLORIDE 5 MILLIGRAM(S): 30 TABLET, FILM COATED, EXTENDED RELEASE ORAL at 22:28

## 2024-01-01 RX ADMIN — Medication 2 TABLET(S): at 22:02

## 2024-01-01 RX ADMIN — OXYCODONE HYDROCHLORIDE 5 MILLIGRAM(S): 30 TABLET, FILM COATED, EXTENDED RELEASE ORAL at 09:25

## 2024-01-01 RX ADMIN — DRONABINOL 2.5 MILLIGRAM(S): 5 CAPSULE ORAL at 21:24

## 2024-01-01 RX ADMIN — Medication 1 TABLET(S): at 13:33

## 2024-01-01 RX ADMIN — OXYCODONE HYDROCHLORIDE 5 MILLIGRAM(S): 30 TABLET, FILM COATED, EXTENDED RELEASE ORAL at 03:35

## 2024-01-01 RX ADMIN — MORPHINE SULFATE 1 MILLIGRAM(S): 30 TABLET, FILM COATED, EXTENDED RELEASE ORAL at 05:40

## 2024-01-01 RX ADMIN — Medication 650 MILLIGRAM(S): at 05:00

## 2024-01-01 RX ADMIN — PIPERACILLIN SODIUM AND TAZOBACTAM SODIUM 200 GRAM(S): 12; 1.5 INJECTION, POWDER, LYOPHILIZED, FOR SOLUTION INTRAVENOUS at 14:24

## 2024-01-01 RX ADMIN — Medication 650 MILLIGRAM(S): at 17:33

## 2024-01-01 RX ADMIN — FUROSEMIDE 40 MILLIGRAM(S): 10 INJECTION INTRAVENOUS at 05:52

## 2024-01-01 RX ADMIN — PANTOPRAZOLE SODIUM 40 MILLIGRAM(S): 40 TABLET, DELAYED RELEASE ORAL at 06:01

## 2024-01-01 RX ADMIN — Medication 10 MILLIGRAM(S): at 05:07

## 2024-01-01 RX ADMIN — Medication 5000 UNIT(S): at 06:01

## 2024-01-01 RX ADMIN — CEFEPIME 100 MILLIGRAM(S): 2 INJECTION, POWDER, FOR SOLUTION INTRAVENOUS at 17:28

## 2024-01-01 RX ADMIN — Medication 1 TABLET(S): at 12:08

## 2024-01-01 RX ADMIN — Medication 5000 UNIT(S): at 17:41

## 2024-01-01 RX ADMIN — METHYLPREDNISOLONE ACETATE 60 MILLIGRAM(S): 80 INJECTION, SUSPENSION INTRA-ARTICULAR; INTRALESIONAL; INTRAMUSCULAR; SOFT TISSUE at 17:26

## 2024-01-01 RX ADMIN — METHYLPREDNISOLONE ACETATE 60 MILLIGRAM(S): 80 INJECTION, SUSPENSION INTRA-ARTICULAR; INTRALESIONAL; INTRAMUSCULAR; SOFT TISSUE at 05:27

## 2024-01-01 RX ADMIN — OXYCODONE HYDROCHLORIDE 5 MILLIGRAM(S): 30 TABLET, FILM COATED, EXTENDED RELEASE ORAL at 21:45

## 2024-01-01 RX ADMIN — Medication 100 MILLILITER(S): at 14:47

## 2024-01-01 RX ADMIN — DRONABINOL 2.5 MILLIGRAM(S): 5 CAPSULE ORAL at 21:37

## 2024-01-01 RX ADMIN — CHLORHEXIDINE GLUCONATE ORAL RINSE 1 APPLICATION(S): 1.2 SOLUTION DENTAL at 12:08

## 2024-01-01 RX ADMIN — Medication 2 TABLET(S): at 21:24

## 2024-01-01 RX ADMIN — Medication 650 MILLIGRAM(S): at 22:04

## 2024-01-01 RX ADMIN — Medication 5000 UNIT(S): at 06:17

## 2024-01-01 RX ADMIN — Medication 650 MILLIGRAM(S): at 18:23

## 2024-01-01 RX ADMIN — Medication 650 MILLIGRAM(S): at 09:05

## 2024-01-01 RX ADMIN — Medication 5 MILLIGRAM(S): at 05:02

## 2024-01-01 RX ADMIN — Medication 25 GRAM(S): at 14:00

## 2024-01-01 RX ADMIN — OXYCODONE HYDROCHLORIDE 5 MILLIGRAM(S): 30 TABLET, FILM COATED, EXTENDED RELEASE ORAL at 04:35

## 2024-01-01 RX ADMIN — Medication 650 MILLIGRAM(S): at 18:26

## 2024-01-01 RX ADMIN — Medication 5000 UNIT(S): at 05:25

## 2024-01-01 RX ADMIN — Medication 1100 UNIT(S)/HR: at 21:32

## 2024-01-01 RX ADMIN — Medication 0.4 MILLIGRAM(S): at 12:08

## 2024-01-01 RX ADMIN — Medication 650 MILLIGRAM(S): at 02:14

## 2024-01-01 RX ADMIN — OXYCODONE HYDROCHLORIDE 5 MILLIGRAM(S): 30 TABLET, FILM COATED, EXTENDED RELEASE ORAL at 05:38

## 2024-01-01 RX ADMIN — Medication 1 TABLET(S): at 12:27

## 2024-01-01 RX ADMIN — Medication 650 MILLIGRAM(S): at 14:00

## 2024-01-01 RX ADMIN — FUROSEMIDE 40 MILLIGRAM(S): 10 INJECTION INTRAVENOUS at 05:49

## 2024-01-01 RX ADMIN — Medication 1 TABLET(S): at 12:01

## 2024-01-01 RX ADMIN — OXYCODONE HYDROCHLORIDE 5 MILLIGRAM(S): 30 TABLET, FILM COATED, EXTENDED RELEASE ORAL at 06:05

## 2024-01-01 RX ADMIN — Medication 5000 UNIT(S): at 05:42

## 2024-01-01 RX ADMIN — CARBOPLATIN 320 MILLIGRAM(S): 450 INJECTION, SOLUTION INTRAVENOUS at 11:46

## 2024-01-01 RX ADMIN — Medication 650 MILLIGRAM(S): at 12:35

## 2024-01-01 RX ADMIN — LOPERAMIDE HYDROCHLORIDE 2 MILLIGRAM(S): 2 CAPSULE ORAL at 05:20

## 2024-01-01 RX ADMIN — CHLORHEXIDINE GLUCONATE ORAL RINSE 1 APPLICATION(S): 1.2 SOLUTION DENTAL at 12:27

## 2024-01-01 RX ADMIN — Medication 500 MILLIGRAM(S): at 21:24

## 2024-01-01 RX ADMIN — OXYCODONE HYDROCHLORIDE 5 MILLIGRAM(S): 30 TABLET, FILM COATED, EXTENDED RELEASE ORAL at 05:41

## 2024-01-01 RX ADMIN — Medication 0.4 MILLIGRAM(S): at 12:19

## 2024-01-01 RX ADMIN — CEFEPIME 100 MILLIGRAM(S): 2 INJECTION, POWDER, FOR SOLUTION INTRAVENOUS at 05:28

## 2024-01-01 RX ADMIN — Medication 100 MILLIGRAM(S): at 14:15

## 2024-01-01 RX ADMIN — CEFEPIME 100 MILLIGRAM(S): 2 INJECTION, POWDER, FOR SOLUTION INTRAVENOUS at 17:52

## 2024-01-01 RX ADMIN — Medication 5000 UNIT(S): at 13:29

## 2024-01-01 RX ADMIN — Medication 5000 UNIT(S): at 05:20

## 2024-09-16 NOTE — ED PROVIDER NOTE - EKG/XRAY ADDITIONAL INFORMATION
ED EKG: my independent interpretation - Dr. Lisandro Mueller : [NSR, no ST elevation]  ED CXR prelim, my independent interpretation - Dr. Lisandro Mueller: [No PTX, No infiltrates, No Free air]

## 2024-09-16 NOTE — ED PROVIDER NOTE - CARE PLAN
Principal Discharge DX:	Abdominal pain  Secondary Diagnosis:	Diarrhea   1 Principal Discharge DX:	Abdominal pain  Secondary Diagnosis:	Diarrhea  Secondary Diagnosis:	KANWAL (acute kidney injury)  Secondary Diagnosis:	Acute UTI

## 2024-09-16 NOTE — ED PROVIDER NOTE - CONSIDERATION OF ADMISSION OBSERVATION
Consideration of Admission/Observation Appropriate medications for patients presenting complaints were offered and effects were re-assessed Escalation to admission was considered. At this time patient requires inpatient hospitalization.

## 2024-09-16 NOTE — ED PROVIDER NOTE - IV ALTEPLASE EXCL REL HIDDEN
[place] [facility]    [facility]    [facility], 12th Floor [address] [place] [address]      Chandni Gregg MD, FAAP - Director    Lyssa Funes, MSW, LICSW -     Pippa Rosado, CNP - Nurse Practitioner    Caterina Bautista MD, FAAP - Physician    Manuela Sol, DO - Physician    MICAELA Ha, LICSW -     [facility]      For questions or concerns, please call our Main Office number at [phone] or [phone] during business hours    Please call [phone] for scheduling    National Child Traumatic Stress Network: Includes resources and information for many different types of traumatic events for all audiences, including parents and caregivers. http://www.nctsn.org/    If you need help locating additional mental health services, please ask a , child protection worker, primary care provider, or another trusted professional. You can also visit [email or web address] for a complete list of professionals who are trained to help children who are victims of traumatic events and their families.        
show

## 2024-09-16 NOTE — ED PROVIDER NOTE - PROGRESS NOTE DETAILS
Perla Miller DO: My personal interpretation of the x-ray is: hyperinflated lungs, no pneumothorax, no air under the diaphragm. Sepsis suspected at this time.

## 2024-09-16 NOTE — ED PROVIDER NOTE - OBJECTIVE STATEMENT
78-year-old male with significant past medical history of hypertension on Norvasc, hyperlipidemia on Lipitor, cervical neck arthritis previous use of opioid pain medication, history of kidney stones presents for 10 days of diarrhea.  Patient ate out on 9/6 and began to experience abdominal pain and diarrhea.  It has been ongoing, constant happening every 20-julius minutes, per patient.  He has lost his appetite and has not eaten anything in the last 2 days.  He has decreased oral intake including liquids.  His abdominal pain is periumbilical, 8/10. Does not drink, smokes.

## 2024-09-16 NOTE — ED PROVIDER NOTE - PHYSICAL EXAMINATION
Vitals: Soft BP, otherwise within normal limits.   General: NAD, laying comfortably in stretcher.   Head: Atraumatic, normocephalic.  Mouth: Dry mucous membranes, uvula midline.  Cardio: RRR, no murmurs auscultated. Pedal and radial pulses 2+, equal. Cap refill <2.   Lungs: Good air movement throughout, no distress. LCTAB.   Abdomen: Hard elkin. RUQ, periumbilical tenderness, bowel sounds present throughout.   Extremities: Full AROM. No cyanosis, edema, or rash noted.

## 2024-09-16 NOTE — ED PROVIDER NOTE - CLINICAL SUMMARY MEDICAL DECISION MAKING FREE TEXT BOX
78 year old male w/ a pmh of htn hld kidney stones presenting here c/o 2 weeks of abdominal pain and non bloody diarrhea. Also c.o of dyrusria and intermittent chest palpitations sob and midsternal chest pain.No fever no vomiting. vital signs reviewed labs imaging ekg obtained and reviewed. Patient noted to have elevated wbc w/ elevated lactate of 4.4 Blood cultures ordered fluids and antibiotics given. Repeat lactate down trended to 3.5. CT abd/pelvis perforemd non con 2/2 to aski noted dense irregularity within the bladder concerning for lesion, prominent retroperitoneal lymph nodes. Initial high sensitity troponin 44 , repeat 47 vs repeated before admission, hemodynamically stable. Patient admitted to [facility].

## 2024-09-16 NOTE — ED ADULT NURSE REASSESSMENT NOTE - NS ED NURSE REASSESS COMMENT FT1
LUE IV placed on Ux infiltrated. Pt denies pain. IV removed, Limb elevated, warm compress applied. MD murray

## 2024-09-16 NOTE — ED PROVIDER NOTE - ATTENDING CONTRIBUTION TO CARE
I personally evaluated the patient. I reviewed the Resident’s or Physician Assistant’s note (as assigned above), and agree with the findings and plan except as documented in my note.    78 year old male w/ a pmh of htn hld kidney stones presenting here c/o 2 weeks of abdominal pain and non bloody diarrhea. Also c.o of dyrusria and intermittent chest palpitations sob and midsternal chest pain.No fever no vomiting.  CONSTITUTIONAL: WA / WN / NAD  HEAD: NCAT  EYES: PERRL; EOMI;   ENT: Normal pharynx; mucous membranes dry  NECK: Supple; no meningeal signs  CARD: RRR; nl S1/S2; no M/R/G.   RESP: Respiratory rate and effort are normal; breath sounds clear and equal bilaterally.  ABD: mildly distended + ttp luq and b/l lower right and left   MSK/EXT: No gross deformities; full range of motion.  SKIN: Warm and dry;   NEURO: AAOx2  PSYCH: Memory Intact, Normal Affect

## 2024-09-17 NOTE — CONSULT NOTE ADULT - ASSESSMENT
79 y/o male with newly diagnosed 5cm bladder mass  - UA, UCx, urine cytology  - KANWAL elevated, Trend Cr  - Will require bladder mass w/u as o/p  - Cysto TURBT as o/p  - Will discuss with attending.  79 y/o male with newly diagnosed 5cm bladder mass  - UA, UCx, urine cytology  - KANWAL elevated, Trend Cr  - Will require bladder mass w/u as o/p  - Cysto TURBT as o/p  - Will discuss with attending.     I reviewed the issues with the patient.  Though he is "only" smoking 2 to 6 cigarettes a day he was explained that cigarette smoking is longitudinal not just lateral i.e. such as the MAC he smoked but how long he was smoking that affects you.  Given his current diarrhea were not going to take him to the operating room once this is better we will arrange for semiurgent follow-up with her urologic oncologist to arrange for TURBT

## 2024-09-17 NOTE — H&P ADULT - HISTORY OF PRESENT ILLNESS
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  Patient ate out on 9/6 and began to experience abdominal pain and diarrhea. Denies any vomiting but endorses nausea, notes having 30-40 loose stools over the past week and over the past day he's had 3-5 loose stools. No melena, no hematochezia, describes stools as watery and filled with mucus. No prior history of this in the past, last colonoscopy was 8 years ago and was normal per patient. Notes 8/10 epigastric and periumbilical pain. Decreased appetite due to pain and diarrhea. No fevers, no chills, no sick contacts, does not endorse eating anything out of the ordinary, does not recall his meal prior to the onset of symptoms. No cp, sob, no loc, no acute vision or hearing changes. No other ros symptoms reported.     ED course:   bp: 92 mm Hg/55 mm Hg  hr: 93 /min  rr: 18 /min  temp;97.7 Degrees F  oral  spo2: 96 %    wbc elevated 12k  lactate 4.4-->3.5-->2.2  cr 2.4 (unknown baseline)     CTAP w/IV contrast:     01. LIVER: Cirrhotic morphology. Hepatic steatosis.  02. SPLEEN: Normal unenhanced.  03. PANCREAS: Atrophic.  04. GALLBLADDER/BILIARY TREE: Gallbladder not definitively visualized.    No biliary duct dilatation.  05. ADRENALS: Normal.  06. KIDNEYS: Symmetric in size.  No hydronephrosis. Bilateral   nonobstructing renal stones measuring 1.5 x 0.7 cm and left renal pole   (series and 601 image 49) and 0.3 cm in the right renal lower pole   (series 3 image 142).  07. LYMPHADENOPATHY/RETROPERITONEUM: Prominent retroperitoneal lymph   nodes measuring upto 1 cm (series 4 image 43).  08. BOWEL: No bowel obstruction.  09. PELVIC VISCERA: Dense irregularity within the urinary bladder   measuring 5.3 x 4.6 cm (series 4 image 89). Prostate measures 3.7 cm in   transverse dimension (series 4 image 93).  10. PELVIC LYMPH NODES: No enlarged lymph nodes.  11. VASCULATURE: Diffuse calcified atherosclerotic disease of the aorta   and its branches. Multiple peripherally calcified vessels at the level of   the mid pancrea    impression:   No bowel obstruction.    Dense irregularity within the urinary bladder measuring up to 5.3 cm   concerning for an underlying lesion. No hydronephrosis.    Prominent retroperitoneal lymph nodes measuring up to 1 cm.    Cirrhotic morphology of the liver. Hepatic steatosis.    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

## 2024-09-17 NOTE — H&P ADULT - ASSESSMENT
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  Patient ate out on 9/6 and began to experience abdominal pain and diarrhea. Denies any vomiting but endorses nausea, notes having 30-40 loose stools over the past week and over the past day he's had 3-5 loose stools. No melena, no hematochezia, describes stools as watery and filled with mucus. No prior history of this in the past, last colonoscopy was 8 years ago and was normal per patient. Notes 8/10 epigastric and periumbilical pain. Decreased appetite due to pain and diarrhea. No fevers, no chills, no sick contacts, does not endorse eating anything out of the ordinary, does not recall his meal prior to the onset of symptoms. No cp, sob, no loc, no acute vision or hearing changes. No other ros symptoms reported.     ED course:   bp: 92 mm Hg/55 mm Hg  hr: 93 /min  rr: 18 /min  temp;97.7 Degrees F  oral  spo2: 96 %    wbc elevated 12k  lactate 4.4-->3.5-->2.2  cr 2.4 (unknown baseline)     CTAP w/IV contrast:     01. LIVER: Cirrhotic morphology. Hepatic steatosis.  02. SPLEEN: Normal unenhanced.  03. PANCREAS: Atrophic.  04. GALLBLADDER/BILIARY TREE: Gallbladder not definitively visualized.    No biliary duct dilatation.  05. ADRENALS: Normal.  06. KIDNEYS: Symmetric in size.  No hydronephrosis. Bilateral   nonobstructing renal stones measuring 1.5 x 0.7 cm and left renal pole   (series and 601 image 49) and 0.3 cm in the right renal lower pole   (series 3 image 142).  07. LYMPHADENOPATHY/RETROPERITONEUM: Prominent retroperitoneal lymph   nodes measuring upto 1 cm (series 4 image 43).  08. BOWEL: No bowel obstruction.  09. PELVIC VISCERA: Dense irregularity within the urinary bladder   measuring 5.3 x 4.6 cm (series 4 image 89). Prostate measures 3.7 cm in   transverse dimension (series 4 image 93).  10. PELVIC LYMPH NODES: No enlarged lymph nodes.  11. VASCULATURE: Diffuse calcified atherosclerotic disease of the aorta   and its branches. Multiple peripherally calcified vessels at the level of   the mid pancrea    impression:   No bowel obstruction.    Dense irregularity within the urinary bladder measuring up to 5.3 cm   concerning for an underlying lesion. No hydronephrosis.    Prominent retroperitoneal lymph nodes measuring up to 1 cm.    Cirrhotic morphology of the liver. Hepatic steatosis.    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  Patient ate out on 9/6 and began to experience abdominal pain and diarrhea. Denies any vomiting but endorses nausea, notes having 30-40 loose stools over the past week and over the past day he's had 3-5 loose stools. No melena, no hematochezia, describes stools as watery and filled with mucus. No prior history of this in the past, last colonoscopy was 8 years ago and was normal per patient. Notes 8/10 epigastric and periumbilical pain. Decreased appetite due to pain and diarrhea. No fevers, no chills, no sick contacts, does not endorse eating anything out of the ordinary, does not recall his meal prior to the onset of symptoms. No cp, sob, no loc, no acute vision or hearing changes. No other ros symptoms reported.     ED course:   bp: 92 mm Hg/55 mm Hg  hr: 93 /min  rr: 18 /min  temp;97.7 Degrees F  oral  spo2: 96 %    wbc elevated 12k  lactate 4.4-->3.5-->2.2  cr 2.4 (unknown baseline)     CTAP w/IV contrast:     01. LIVER: Cirrhotic morphology. Hepatic steatosis.  02. SPLEEN: Normal unenhanced.  03. PANCREAS: Atrophic.  04. GALLBLADDER/BILIARY TREE: Gallbladder not definitively visualized.    No biliary duct dilatation.  05. ADRENALS: Normal.  06. KIDNEYS: Symmetric in size.  No hydronephrosis. Bilateral   nonobstructing renal stones measuring 1.5 x 0.7 cm and left renal pole   (series and 601 image 49) and 0.3 cm in the right renal lower pole   (series 3 image 142).  07. LYMPHADENOPATHY/RETROPERITONEUM: Prominent retroperitoneal lymph   nodes measuring upto 1 cm (series 4 image 43).  08. BOWEL: No bowel obstruction.  09. PELVIC VISCERA: Dense irregularity within the urinary bladder   measuring 5.3 x 4.6 cm (series 4 image 89). Prostate measures 3.7 cm in   transverse dimension (series 4 image 93).  10. PELVIC LYMPH NODES: No enlarged lymph nodes.  11. VASCULATURE: Diffuse calcified atherosclerotic disease of the aorta   and its branches. Multiple peripherally calcified vessels at the level of   the mid pancrea    impression:   No bowel obstruction.    Dense irregularity within the urinary bladder measuring up to 5.3 cm   concerning for an underlying lesion. No hydronephrosis.    Prominent retroperitoneal lymph nodes measuring up to 1 cm.    Cirrhotic morphology of the liver. Hepatic steatosis.    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      79 y/o male w/pmhx as noted above presenting w/diarrhea, kanwal, abd pain       #Diarrhea  #Abd pain   #KANWAL   #transaminitis  #hepatic steatosis   #elevated alk phos  - kanwal likely pre-renal from dehydration   - diarrhea unlikely to be infectious given history   - f/u stool studies   - f/u bcx and ucx   - SIRS positive, received empiric zosyn, can do rocephin flagyl for now, dc if infectious work up negative   - IVF 150cc/hr      #HTN  - hold meds, bp soft    #HLD  - cw statin     #Nephrolithiasis   - notes passing kidney stones in past and had one recently   - consider US for further eval     #Bladder irregularity   - can consider Urology work up as outpatient       #MISC  - IAT   - DVT proph: heparin sub-q  - GI proph: protonix   - admit: medicine   -discussed GOC: full code

## 2024-09-17 NOTE — H&P ADULT - NSHPPHYSICALEXAM_GEN_ALL_CORE
General: NAD, laying comfortably in bed  Head: Atraumatic, normocephalic.  Mouth: Dry mucous membranes,  Cardio: RRR, no murmurs auscultated. Pedal and radial pulses 2+, equal. Cap refill <2.   Lungs: CTA b/l, no wheezing  Abdomen: epigastric and yasemin umbilical tenderness  Extremities: Full AROM. No cyanosis, edema, or rash noted.  Neuro: AAOx3, no focal deficits

## 2024-09-17 NOTE — PATIENT PROFILE ADULT - MEDICATIONS/VISITS
----- Message from Yo Snyder MD sent at 6/2/2018  9:49 AM CDT -----  Regarding: Please schedule  (I saw no retina surgeon available, so told him to go to [facility])    Thanks,   Yo     no

## 2024-09-17 NOTE — CONSULT NOTE ADULT - NS ATTEND AMEND GEN_ALL_CORE FT
I personally saw and examined the patient, reviewed the chart and available data. I discussed the situation with the patient and KALEIGH DAMON. I also reviewed and/or amended the note as necessary.    patient seen on the day in question. Note not reviewed and cosigned until now due to scheduling issues

## 2024-09-17 NOTE — POST DISCHARGE NOTE - NOTIFICATION:
DIABETES WITHOUT RETINOPATHY: RETURN FOR FOLLOW-UP AS SCHEDULED FOR DILATED EXAM. Notified Dr. Victoriano Coreas of patient's CT findings.  Please contact [email or web address] or [phone] or select “Cancer Care Direct” on the discharge disposition sheet when the patient is close to discharge for assistance in outpatient care.

## 2024-09-17 NOTE — H&P ADULT - NSHPLABSRESULTS_GEN_ALL_CORE
13.7   12.26 )-----------( 339      ( 16 Sep 2024 12:30 )             39.5       09-17    133<L>  |  95<L>  |  48<H>  ----------------------------<  108<H>  5.7<H>   |  19  |  2.4<H>    Ca    9.9      17 Sep 2024 00:07  Mg     2.3     09-16    TPro  6.4  /  Alb  3.8  /  TBili  2.6<H>  /  DBili  x   /  AST  304<H>  /  ALT  84<H>  /  AlkPhos  596<H>  09-16

## 2024-09-17 NOTE — CONSULT NOTE ADULT - SUBJECTIVE AND OBJECTIVE BOX
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  Patient ate out on 9/6 and began to experience abdominal pain and diarrhea. Denies any vomiting but endorses nausea, notes having 30-40 loose stools over the past week and over the past day he's had 3-5 loose stools. No melena, no hematochezia, describes stools as watery and filled with mucus. No prior history of this in the past, last colonoscopy was 8 years ago and was normal per patient. Notes 8/10 epigastric and periumbilical pain. Decreased appetite due to pain and diarrhea. No fevers, no chills, no sick contacts, does not endorse eating anything out of the ordinary, does not recall his meal prior to the onset of symptoms. No cp, sob, no loc, no acute vision or hearing changes. No other ros symptoms reported.    (17 Sep 2024 03:44)    : Pt used to be former smoker pack/day still continues to smoke 2-3 cigarettes/week. States he has experienced weight loss over the past couple weeks but attributes it to recent illness involving diarrhea and nausea. Denies any fevers, chills. Has been having dysuria and states he occasionally has intermittent hematuria if he passes a stone. Has required procedures for stones in the past including lithotripsy, JJ stent placements and ESWL. Used to follow with Dr. Camacho 2 years ago.     PAST MEDICAL & SURGICAL HISTORY:  HTN (hypertension)  HLD (hyperlipidemia)  Kidney stone  No significant past surgical history    MEDICATIONS  (STANDING):  atorvastatin 20 milliGRAM(s) Oral at bedtime  cefTRIAXone   IVPB 1000 milliGRAM(s) IV Intermittent every 24 hours  heparin   Injectable 5000 Unit(s) SubCutaneous every 12 hours  lactated ringers. 1000 milliLiter(s) (150 mL/Hr) IV Continuous <Continuous>  metroNIDAZOLE  IVPB 500 milliGRAM(s) IV Intermittent every 8 hours  metroNIDAZOLE  IVPB      pantoprazole    Tablet 40 milliGRAM(s) Oral before breakfast    MEDICATIONS  (PRN):  acetaminophen     Tablet .. 650 milliGRAM(s) Oral every 6 hours PRN Severe Pain (7 - 10)    Allergies  No Known Allergies    SOCIAL HISTORY: No illicit drug use    FAMILY HISTORY:  FH: CHF (congestive heart failure) (Father)    REVIEW OF SYSTEMS   [x] A ten-point review of systems was otherwise negative except as noted.    Vital Signs Last 24 Hrs  T(C): 36.4 (17 Sep 2024 16:02), Max: 36.7 (17 Sep 2024 07:55)  T(F): 97.5 (17 Sep 2024 16:02), Max: 98 (17 Sep 2024 07:55)  HR: 78 (17 Sep 2024 16:02) (78 - 84)  BP: 112/62 (17 Sep 2024 16:02) (103/55 - 124/65)  BP(mean): 75 (17 Sep 2024 00:07) (75 - 75)  RR: 18 (17 Sep 2024 16:02) (18 - 18)  SpO2: 96% (17 Sep 2024 16:02) (96% - 97%)    Parameters below as of 17 Sep 2024 00:07  Patient On (Oxygen Delivery Method): room air    PHYSICAL EXAM:  GEN: NAD, awake and alert.  SKIN: Good color, non diaphoretic.  RESP: Non-labored breathing. No use of accessory muscles.  CARDIO: +S1/S2  ABDO: Soft, NT/ND, no palpable bladder, no suprapubic tenderness  BACK: No CVAT B/L  EXT: ROSARIO x 4    LABS:                     12.3   11.32 )-----------( 290      ( 17 Sep 2024 09:00 )             35.3     09-17    137  |  98  |  53[H]  ----------------------------<  84  4.6   |  18  |  2.4[H]    Ca    9.4      17 Sep 2024 09:00  Phos  4.8     09-17  Mg     2.2     09-17    TPro  5.6[L]  /  Alb  3.7  /  TBili  2.1[H]  /  DBili  x   /  AST  260[H]  /  ALT  70[H]  /  AlkPhos  485[H]  09-17    PT/INR - ( 17 Sep 2024 09:00 )   PT: 12.10 sec;   INR: 1.06 ratio        PTT - ( 17 Sep 2024 09:00 )  PTT:30.6 sec  Urinalysis Basic - ( 17 Sep 2024 09:00 )    Color: x / Appearance: x / SG: x / pH: x  Gluc: 84 mg/dL / Ketone: x  / Bili: x / Urobili: x   Blood: x / Protein: x / Nitrite: x   Leuk Esterase: x / RBC: x / WBC x   Sq Epi: x / Non Sq Epi: x / Bacteria: x    RADIOLOGY & ADDITIONAL STUDIES:  < from: CT Abdomen and Pelvis No Cont (09.16.24 @ 14:51) >    ACC: [id number] EXAM:  CT ABDOMEN AND PELVIS   ORDERED BY: FERDINAND KEY     PROCEDURE DATE:  09/16/2024          INTERPRETATION:  Clinical Indication: Tense abdomen. Diarrhea for 10 days.    Technique: Multidetector-row CT images of the abdomen and pelvis were   obtained from the xiphoid through the symphysis pubis. No contrast was   administered. Coronal and sagittal reconstructions were performed.    Comparison: None available.    Findings:    01. LIVER: Cirrhotic morphology. Hepatic steatosis.  02. SPLEEN: Normal unenhanced.  03. PANCREAS: Atrophic.  04. GALLBLADDER/BILIARY TREE: Gallbladder not definitively visualized.    No biliary duct dilatation.  05. ADRENALS: Normal.  06. KIDNEYS: Symmetric in size.  No hydronephrosis. Bilateral   nonobstructing renal stones measuring 1.5 x 0.7 cm and left renal pole   (series and 601 image 49) and 0.3 cm in the right renal lower pole   (series 3 image 142).  07. LYMPHADENOPATHY/RETROPERITONEUM: Prominent retroperitoneal lymph   nodes measuring upto 1 cm (series 4 image 43).  08. BOWEL: No bowel obstruction.  09. PELVIC VISCERA: Dense irregularity within the urinary bladder   measuring 5.3 x 4.6 cm (series 4 image 89). Prostate measures 3.7 cm in   transverse dimension (series 4 image 93).  10. PELVIC LYMPH NODES: No enlarged lymph nodes.  11. VASCULATURE: Diffuse calcified atherosclerotic disease of the aorta   and its branches. Multiple peripherally calcified vessels at the level of   the mid pancreas may represent aneurysms measuring up to 1.1 cm (series 4   image 42).  12. PERITONEUM/ABDOMINAL WALL: No gross ascites.  13. SKELETAL: Degenerative changes. Left femoral head sclerotic focus may   represent a bone island. Scoliosis.  14. LUNG BASES: Right lower lobe subpleural nodularity may represent   atelectasis (series 3 image 25). Areas of small atelectasis. Questionable   nodule versus atelectasis in the right lower lung measuring 0.9 cm   (series 3 image 26) and in the left lower lung measuring 0.9 cm (series 3   image 24). Motion artifact degrades image quality especially at the level   of the lower lungs.    IMPRESSION:    No bowel obstruction.    Dense irregularity within the urinary bladder measuring up to 5.3 cm   concerning for an underlying lesion. No hydronephrosis.    Prominent retroperitoneal lymph nodes measuring up to 1 cm.    Cirrhotic morphology of the liver. Hepatic steatosis.    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    Additional findings are detailed in the body of report.    Dr. Chowdhury spoke to Dr. Mueller at 3:19 PM on 9/16/2024 regarding   findings with read back.      --- End of Report ---      KAREN CHOWDHURY MD; Attending Radiologist  This document has been electronically signed. 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  Patient ate out on 9/6 and began to experience abdominal pain and diarrhea. Denies any vomiting but endorses nausea, notes having 30-40 loose stools over the past week and over the past day he's had 3-5 loose stools. No melena, no hematochezia, describes stools as watery and filled with mucus. No prior history of this in the past, last colonoscopy was 8 years ago and was normal per patient. Notes 8/10 epigastric and periumbilical pain. Decreased appetite due to pain and diarrhea. No fevers, no chills, no sick contacts, does not endorse eating anything out of the ordinary, does not recall his meal prior to the onset of symptoms. No cp, sob, no loc, no acute vision or hearing changes. No other ros symptoms reported.    (17 Sep 2024 03:44)    : Pt used to be former smoker pack/day still continues to smoke 2-3 cigarettes/week. States he has experienced weight loss over the past couple weeks but attributes it to recent illness involving diarrhea and nausea. Denies any fevers, chills. Has been having dysuria and states he occasionally has intermittent hematuria if he passes a stone. Has required procedures for stones in the past including lithotripsy, JJ stent placements and ESWL. Used to follow with Dr. Camacho >2 years ago.  Offered follow up there and declines.    PAST MEDICAL & SURGICAL HISTORY:  HTN (hypertension)  HLD (hyperlipidemia)  Kidney stone  No significant past surgical history    MEDICATIONS  (STANDING):  atorvastatin 20 milliGRAM(s) Oral at bedtime  cefTRIAXone   IVPB 1000 milliGRAM(s) IV Intermittent every 24 hours  heparin   Injectable 5000 Unit(s) SubCutaneous every 12 hours  lactated ringers. 1000 milliLiter(s) (150 mL/Hr) IV Continuous <Continuous>  metroNIDAZOLE  IVPB 500 milliGRAM(s) IV Intermittent every 8 hours  metroNIDAZOLE  IVPB      pantoprazole    Tablet 40 milliGRAM(s) Oral before breakfast    MEDICATIONS  (PRN):  acetaminophen     Tablet .. 650 milliGRAM(s) Oral every 6 hours PRN Severe Pain (7 - 10)    Allergies  No Known Allergies    SOCIAL HISTORY: No illicit drug use    FAMILY HISTORY:  FH: CHF (congestive heart failure) (Father)    REVIEW OF SYSTEMS   [x] A ten-point review of systems was otherwise negative except as noted.    Vital Signs Last 24 Hrs  T(C): 36.4 (17 Sep 2024 16:02), Max: 36.7 (17 Sep 2024 07:55)  T(F): 97.5 (17 Sep 2024 16:02), Max: 98 (17 Sep 2024 07:55)  HR: 78 (17 Sep 2024 16:02) (78 - 84)  BP: 112/62 (17 Sep 2024 16:02) (103/55 - 124/65)  BP(mean): 75 (17 Sep 2024 00:07) (75 - 75)  RR: 18 (17 Sep 2024 16:02) (18 - 18)  SpO2: 96% (17 Sep 2024 16:02) (96% - 97%)    Parameters below as of 17 Sep 2024 00:07  Patient On (Oxygen Delivery Method): room air    PHYSICAL EXAM:  GEN: NAD, awake and alert.  SKIN: Good color, non diaphoretic.  RESP: Non-labored breathing. No use of accessory muscles.  CARDIO: +S1/S2  ABDO: Soft, NT/ND, no palpable bladder, no suprapubic tenderness  BACK: No CVAT B/L  EXT: ROSARIO x 4    LABS:                     12.3   11.32 )-----------( 290      ( 17 Sep 2024 09:00 )             35.3     09-17    137  |  98  |  53[H]  ----------------------------<  84  4.6   |  18  |  2.4[H]    Ca    9.4      17 Sep 2024 09:00  Phos  4.8     09-17  Mg     2.2     09-17    TPro  5.6[L]  /  Alb  3.7  /  TBili  2.1[H]  /  DBili  x   /  AST  260[H]  /  ALT  70[H]  /  AlkPhos  485[H]  09-17    PT/INR - ( 17 Sep 2024 09:00 )   PT: 12.10 sec;   INR: 1.06 ratio        PTT - ( 17 Sep 2024 09:00 )  PTT:30.6 sec  Urinalysis Basic - ( 17 Sep 2024 09:00 )    Color: x / Appearance: x / SG: x / pH: x  Gluc: 84 mg/dL / Ketone: x  / Bili: x / Urobili: x   Blood: x / Protein: x / Nitrite: x   Leuk Esterase: x / RBC: x / WBC x   Sq Epi: x / Non Sq Epi: x / Bacteria: x    RADIOLOGY & ADDITIONAL STUDIES:  < from: CT Abdomen and Pelvis No Cont (09.16.24 @ 14:51) >    ACC: [id number] EXAM:  CT ABDOMEN AND PELVIS   ORDERED BY: FERDINAND KEY     PROCEDURE DATE:  09/16/2024          INTERPRETATION:  Clinical Indication: Tense abdomen. Diarrhea for 10 days.    Technique: Multidetector-row CT images of the abdomen and pelvis were   obtained from the xiphoid through the symphysis pubis. No contrast was   administered. Coronal and sagittal reconstructions were performed.    Comparison: None available.    Findings:    01. LIVER: Cirrhotic morphology. Hepatic steatosis.  02. SPLEEN: Normal unenhanced.  03. PANCREAS: Atrophic.  04. GALLBLADDER/BILIARY TREE: Gallbladder not definitively visualized.    No biliary duct dilatation.  05. ADRENALS: Normal.  06. KIDNEYS: Symmetric in size.  No hydronephrosis. Bilateral   nonobstructing renal stones measuring 1.5 x 0.7 cm and left renal pole   (series and 601 image 49) and 0.3 cm in the right renal lower pole   (series 3 image 142).  07. LYMPHADENOPATHY/RETROPERITONEUM: Prominent retroperitoneal lymph   nodes measuring upto 1 cm (series 4 image 43).  08. BOWEL: No bowel obstruction.  09. PELVIC VISCERA: Dense irregularity within the urinary bladder   measuring 5.3 x 4.6 cm (series 4 image 89). Prostate measures 3.7 cm in   transverse dimension (series 4 image 93).  10. PELVIC LYMPH NODES: No enlarged lymph nodes.  11. VASCULATURE: Diffuse calcified atherosclerotic disease of the aorta   and its branches. Multiple peripherally calcified vessels at the level of   the mid pancreas may represent aneurysms measuring up to 1.1 cm (series 4   image 42).  12. PERITONEUM/ABDOMINAL WALL: No gross ascites.  13. SKELETAL: Degenerative changes. Left femoral head sclerotic focus may   represent a bone island. Scoliosis.  14. LUNG BASES: Right lower lobe subpleural nodularity may represent   atelectasis (series 3 image 25). Areas of small atelectasis. Questionable   nodule versus atelectasis in the right lower lung measuring 0.9 cm   (series 3 image 26) and in the left lower lung measuring 0.9 cm (series 3   image 24). Motion artifact degrades image quality especially at the level   of the lower lungs.    IMPRESSION:    No bowel obstruction.    Dense irregularity within the urinary bladder measuring up to 5.3 cm   concerning for an underlying lesion. No hydronephrosis.    Prominent retroperitoneal lymph nodes measuring up to 1 cm.    Cirrhotic morphology of the liver. Hepatic steatosis.    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    Additional findings are detailed in the body of report.    Dr. Chowdhury spoke to Dr. Mueller at 3:19 PM on 9/16/2024 regarding   findings with read back.      --- End of Report ---      KAREN CHOWDHURY MD; Attending Radiologist  This document has been electronically signed. Sep16 2024  3:20PM    < end of copied text >

## 2024-09-17 NOTE — H&P ADULT - ATTENDING COMMENTS
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  Patient ate out on 9/6 and began to experience abdominal pain and diarrhea. Denies any vomiting but endorses nausea, notes having 30-40 loose stools over the past week and over the past day he's had 3-5 loose stools. No melena, no hematochezia, describes stools as watery and filled with mucus. No prior history of this in the past, last colonoscopy was 8 years ago and was normal per patient. Notes 8/10 epigastric and periumbilical pain. Decreased appetite due to pain and diarrhea. No fevers, no chills, no sick contacts, does not endorse eating anything out of the ordinary, does not recall his meal prior to the onset of symptoms. No cp, sob, no loc, no acute vision or hearing changes. No other ros symptoms reported.     wbc elevated 12k  lactate 4.4-->3.5-->2.2  cr 2.4 (unknown baseline)     CTAP w/IV contrast:     01. LIVER: Cirrhotic morphology. Hepatic steatosis.  02. SPLEEN: Normal unenhanced.  03. PANCREAS: Atrophic.  04. GALLBLADDER/BILIARY TREE: Gallbladder not definitively visualized.    No biliary duct dilatation.  05. ADRENALS: Normal.  06. KIDNEYS: Symmetric in size.  No hydronephrosis. Bilateral   nonobstructing renal stones measuring 1.5 x 0.7 cm and left renal pole   (series and 601 image 49) and 0.3 cm in the right renal lower pole   (series 3 image 142).  07. LYMPHADENOPATHY/RETROPERITONEUM: Prominent retroperitoneal lymph   nodes measuring upto 1 cm (series 4 image 43).  08. BOWEL: No bowel obstruction.  09. PELVIC VISCERA: Dense irregularity within the urinary bladder   measuring 5.3 x 4.6 cm (series 4 image 89). Prostate measures 3.7 cm in   transverse dimension (series 4 image 93).  10. PELVIC LYMPH NODES: No enlarged lymph nodes.  11. VASCULATURE: Diffuse calcified atherosclerotic disease of the aorta   and its branches. Multiple peripherally calcified vessels at the level of   the mid pancrea    impression:   No bowel obstruction.    Dense irregularity within the urinary bladder measuring up to 5.3 cm   concerning for an underlying lesion. No hydronephrosis.    Prominent retroperitoneal lymph nodes measuring up to 1 cm.    Cirrhotic morphology of the liver. Hepatic steatosis.    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      79 y/o male w/pmhx as noted above presenting w/diarrhea, kanwal, abd pain       #Diarrhea  #Abd pain   #KANWAL   #transaminitis  #hepatic steatosis   #elevated alk phos  - kanwal likely pre-renal from dehydration   - diarrhea unlikely to be infectious given history   - f/u stool studies   - f/u bcx and ucx   - SIRS positive, received empiric zosyn, can do rocephin flagyl for now, dc if infectious work up negative   - IVF 150cc/hr      #HTN  - hold meds, bp soft    #HLD  - cw statin     #Nephrolithiasis   - notes passing kidney stones in past and had one recently   - consider US for further eval     #Bladder irregularity   f/u Urology        - DVT proph: heparin sub-q  - GI proph: protonix   -discussed GOC: full code

## 2024-09-18 NOTE — PHARMACOTHERAPY INTERVENTION NOTE - COMMENTS
Changed metoprolol tartrate 25mg po once daily to metoprolol succinate on patient's home med list per [facility].

## 2024-09-18 NOTE — PHARMACOTHERAPY INTERVENTION NOTE - COMMENTS
Adjusted the dose of benazepril 20mg to 10mg po once daily on patient's home med list per [facility].

## 2024-09-18 NOTE — PROGRESS NOTE ADULT - ASSESSMENT
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  Patient ate out on 9/6 and began to experience abdominal pain and diarrhea. Denies any vomiting but endorses nausea, notes having 30-40 loose stools over the past week and over the past day he's had 3-5 loose stools. No melena, no hematochezia, describes stools as watery and filled with mucus. No prior history of this in the past, last colonoscopy was 8 years ago and was normal per patient. Notes 8/10 epigastric and periumbilical pain. Decreased appetite due to pain and diarrhea. No fevers, no chills, no sick contacts, does not endorse eating anything out of the ordinary, does not recall his meal prior to the onset of symptoms. No cp, sob, no loc, no acute vision or hearing changes. No other ros symptoms reported.   #Diarrhea  #Abd pain   #transaminitis  #hepatic steatosis   #elevated alk phos  -pt has history of fentanyl patch use - could be accounting for diarrhea with w/d diarrhea  -pt states two weeks when abdominal pain and diarrhea started, he had shrimp/seafood from [employer]  -on fentayl patch due to cervical neck pain that is chronic - does not want at thistime  - CT a/p showing cirrhotic morphology - which is new for patient  - RUQ US  - t bili 2.1  -  ALT 70  - lipase 81  - hepatitis panel pending   - f/u stool studies   - f/u c diff PCR  - f/u bcx and ucx   - stool ova and parasites  - hold abx for now  - IVF 150cc/hr  - obtain 5HIAA, gastrin level (off PPI), VIP level  - stool electrolytes to calculate stool osm gap  - GI consult     #kanwal, HAGMA  -suspect HAGMA in setting of KANWAL  cr 2.4 (unknown baseline)   - kanwal likely pre-renal from dehydration     #bladder mass  - Dense irregularity within the urinary bladder measuring up to 5.3 cm   concerning for an underlying lesion. No hydronephrosis.  - urology evaluated - o/p f/u    #HTN  - restart amlodipine  - resart metoprolol  - with hold paramaters  - hold ACE in setting of KANWAL    #HLD  - hold statin due to elevated LFTs  - calculate ASCVD to see if pt requires statin at this time vs lifestyle changes, may need alternative antilipid med    #Nephrolithiasis   - notes passing kidney stones in past and had one recently   - consider US for further eval     #Bladder mass on CTAP  f/u Urology  - ua, ucx, urine cytology    DVT proph: heparin sub-q   GI ppx - no ppi  discussed GOC: full code

## 2024-09-18 NOTE — PHARMACOTHERAPY INTERVENTION NOTE - COMMENTS
Added Fentanyl patch 25mcg/hr, Ambien 10mg po once daily, multivitamins, and aspirin 81mg once daily to home med list per patient.

## 2024-09-18 NOTE — PHARMACOTHERAPY INTERVENTION NOTE - COMMENTS
Completed a med red with patient. Confirmed home meds and added fentanyl patch, Ambien, multivitamins, and aspirin per patient. Last dose of Fentanyl patch was early last week per patient.    Home Medications:  Ambien 10 mg oral tablet: 0.5 tablet orally at bedtime as needed (18 Sep 2024 10:01)  aspirin: 81 milligram(s) orally once a day (18 Sep 2024 10:01)  benazepril 20 mg oral tablet: 1 tab(s) orally once a day (17 Sep 2024 03:55)  fentaNYL 25 mcg/hr transdermal film, extended release: 1 patch transdermally every 3 days as needed for moderate pain (18 Sep 2024 10:01)  Lipitor 20 mg oral tablet: 1 tab(s) orally once a day (17 Sep 2024 03:56)  metoprolol tartrate 25 mg oral tablet: 1 tab(s) orally once a day (17 Sep 2024 03:57)  multivitamin: One tablet orally once daily (18 Sep 2024 10:01)  Norvasc 10 mg oral tablet: 1 tab(s) orally once a day (17 Sep 2024 03:55)   Completed a med rec with patient. Confirmed home meds and added fentanyl patch, zolpidem, multivitamins, and aspirin per patient. Last placement of Fentanyl patch was early last week per patient, and he does not currently have a patch on.    Home Medications:  amlodipine 10 mg oral tablet: 1 tab(s) orally once a day (17 Sep 2024 03:55)  aspirin: 81 milligram(s) orally once a day (18 Sep 2024 10:01)  benazepril 20 mg oral tablet: 1 tab(s) orally once a day (17 Sep 2024 03:55)  fentaNYL 25 mcg/hr transdermal film, extended release: 1 patch transdermally every 3 days as needed for moderate pain (18 Sep 2024 10:01)  atorvastatin 20 mg oral tablet: 1 tab(s) orally once a day (17 Sep 2024 03:56)  metoprolol tartrate 25 mg oral tablet: 1 tab(s) orally once a day (17 Sep 2024 03:57)  multivitamin: One tablet orally once daily (18 Sep 2024 10:01)  zolpidem 10 mg oral tablet: 0.5 tablet orally at bedtime as needed for sleep (18 Sep 2024 10:01)     Completed a med rec with patient and [facility] ([phone]). Confirmed home meds and added fentanyl patch, zolpidem, multivitamins, and aspirin per patient. Adjusted the dose of benazepril 20mg to 10mg and formulation of metoprolol tartrate to succinate per Fidel from [facility]. Last placement of Fentanyl patch was early last week per patient, and he does not currently have a patch on.    Home Medications:  amlodipine 10 mg oral tablet: 1 tab(s) orally once a day (17 Sep 2024 03:55)  aspirin: 81 milligram(s) orally once a day (18 Sep 2024 10:01)  benazepril 10 mg oral tablet: 1 tab(s) orally once a day (17 Sep 2024 03:55)  fentaNYL 25 mcg/hr transdermal film, extended release: 1 patch transdermally every 3 days as needed for moderate pain (18 Sep 2024 10:01)  atorvastatin 20 mg oral tablet: 1 tab(s) orally once a day (17 Sep 2024 03:56)  metoprolol succinate 25 mg oral tablet: 1 tab(s) orally once a day (17 Sep 2024 03:57)  multivitamin: One tablet orally once daily (18 Sep 2024 10:01)  zolpidem 10 mg oral tablet: 0.5 tablet orally at bedtime as needed for sleep (18 Sep 2024 10:01)     Completed a med rec with patient and [facility] ([phone]). Confirmed home meds and added fentanyl patch, zolpidem, multivitamins, and aspirin per patient. Adjusted the dose of benazepril 20mg to 10mg and formulation of metoprolol tartrate to succinate per Fidel from [facility]. Per [facility] patient recently filled prescription for fentanyl patch 25 mcg and zolpidem 10 mg po daily on 9/6/24. Per patient last placement of fentanyl patch was early last week per patient, he does not use unless he feels he needs it and he does not currently have a patch on.    Home Medications:  amlodipine 10 mg oral tablet: 1 tab(s) orally once a day (17 Sep 2024 03:55)  aspirin: 81 milligram(s) orally once a day (18 Sep 2024 10:01)  benazepril 10 mg oral tablet: 1 tab(s) orally once a day (17 Sep 2024 03:55)  fentaNYL 25 mcg/hr transdermal film, extended release: 1 patch transdermally every 3 days as needed for moderate pain (18 Sep 2024 10:01)  atorvastatin 20 mg oral tablet: 1 tab(s) orally once a day (17 Sep 2024 03:56)  metoprolol succinate 25 mg oral tablet: 1 tab(s) orally once a day (17 Sep 2024 03:57)  multivitamin: One tablet orally once daily (18 Sep 2024 10:01)  zolpidem 10 mg oral tablet: 0.5 tablet orally at bedtime as needed for sleep (18 Sep 2024 10:01)

## 2024-09-18 NOTE — DISCHARGE NOTE NURSING/CASE MANAGEMENT/SOCIAL WORK - PATIENT PORTAL LINK FT
You can access the FollowMyHealth Patient Portal offered by [facility] by registering at the following website: [email or web address]. By joining [employer]’s FollowMyHealth portal, you will also be able to view your health information using other applications (apps) compatible with our system.

## 2024-09-18 NOTE — CONSULT NOTE ADULT - ASSESSMENT
Assessment and Plan  Case of a 78 year old male patient with history of HTN, DL, recurrent nephrolithiasis, and alcohol use in past who presented to the ED on 09/16 for evaluation of watery diarrhea, found to have evidence of KANWAL on labs and concern for cirrhosis on imaging. We are consulted for concern of cirrhosis on imaging.      Concern for Liver Cirrhosis; Likely Ethanol Related in Setting of History of Alcohol Use (MELD 3- 17; Child A 6)  No Evidence of Portal HTN on Imaging  No Anemia or Thrombocytopenia; No SPM  No Ascites  Unknown History of Esophageal Varices (no prior EGD)  No Hepatic Encephalopathy   * History of ethanol use: used to drink [employer] 1-2 shots daily from 4457-3901 then switched to occasional beer drinking (drank 3 beers in last years; last beer was on MARIANN)        RECOMMENDATIONS        - Follow up with our [facility] located at [address] [place] [address]. Phone Number: [phone]    - Follow up with our GI Hepatology [facility] located at [address] [place] [address], [facility]. Telephone No: [phone]    Thank you for your consult.  - Please note that plan was communicated with medical team.   - Please reach GI on [phone] during weekdays till 5pm.  - Please call the GI service line after 5pm on Weekdays and anytime on Weekends: [phone].      Roseanna Tomas MD  PGY - 5 Gastroenterology Fellow   [facility]     Assessment and Plan  Case of a 78 year old male patient with history of HTN, DL, recurrent nephrolithiasis, and alcohol use in past who presented to the ED on 09/16 for evaluation of watery diarrhea, found to have evidence of KANWAL on labs and concern for cirrhosis on imaging. We are consulted for concern of cirrhosis on imaging.      Elevated LFTs: Likely Secondary to Acute Liver Injury  Rule Out Viral Infection (Adenovirus) VS Bacterial Infection (Campylobacter) in Setting of Diarrhea  Rule Out DILI (Statin)  Concern for Liver Cirrhosis on CT pending US; History of Alcohol Use (MELD 3- 17; Child A 6)  No Evidence of Portal HTN on Imaging  No Anemia or Thrombocytopenia; No SPM  No Ascites  Unknown History of Esophageal Varices (no prior EGD)  No Hepatic Encephalopathy   * History of ethanol use: used to drink [employer] 1-2 shots daily from 7534-9338 then switched to occasional beer drinking (drank 3 beers in last years; last beer was on MARIANN); notes snorting cocaine in past; marijuana use in past; denies multiple sexual partners (wife passed away)  * No CLD workup   * No previous abdominal imaging  * LFTs 2.6/596/304/84 on 09/16 -> 2.1/485/260/70 on 09/17 (previous LFTs 05/30/2024 0.4/92/30/22)  * INR 1.06 on 09/17  * RUQ US 09/18 cirrhosis; normal CBD; no ascites  * CT Abdomen and Pelvis No Cont (09.16.24 @ 14:51) Dense irregularity within the urinary bladder measuring up to 5.3 cm concerning for an underlying lesion. No hydronephrosis. Prominent retroperitoneal lymph nodes measuring up to 1 cm. Cirrhotic morphology of the liver. Hepatic steatosis.  * No previous EGD; last colonoscopy many years ago per patient (unremarkable per patient)  * No FHx of liver diseases or GI cancers    RECOMMENDATIONS  - Trend hepatic function panel and INR daily for MELD Score calculation  - In case of no downtrending in T-bili, will recommend MR abdomen  - Check Ca 19-9 and AFP; check HIV  - Avoid hepatotoxic agents: agree with holding Atorvastatin 20mg for now  - Check TTE  - Check CLD workup: TOVA, AMA, ASMA, soluble microsomal Ab, and Ig panel  - For diarrhea since 09/07: GI PCR - on 09/17; follow up stool culture, elastase, fat (received on 09/17); check C difficile PCR/toxin and stool ova parasites. Received Zosyn/Rocephin and flagyl until 09/18. Currently monitoring off Abs  - For Esophageal varices screening: recommend outpatient EGD. Off B Blockers  - No Ascites on current exam/imaging. Recommend serial exams and tap when possible. Off diuretics  - Monitor for Hepatic encephalopathy: avoid constipation; avoid sedatives and limit opioids   - For HCC screening: Please f/u as outpatient for US abdomen and AFP every 6 months.  - For Vaccinations: Please f/u in clinic for being uptodate with HAV/HBV/Influenza/Pneumococcal vaccines.  - Lifestyle/Dietary modifications: Calorie intake 25-40 Kcal/kg/day; Protein intake: 1.2-1.5 gm/kg/day  - Avoid smoking and NSAIDS  - Abstain from alcohol and all illicit drugs  - Avoid use of herbal and dietary supplements due to potential hepatotoxicity. Limit use of acetaminophen to <2 grams per day. Avoid use of nonsteroidal antiinflammatory drugs (NSAIDs) . Avoid eating any unpasteurized dairy products; avoid eating any raw or undercooked eggs, fish, poultry, or meat; and avoid eating raw/steamed oysters or other shellfish to avoid risk of Vibrio infection.    - Follow up with our GI [facility]atology [facility] located at [address] [place] [address], [facility]. Telephone No: [phone]      Thank you for your consult.  - Please note that plan was communicated with medical team.   - Please reach GI on [phone] during weekdays till 5pm.  - Please call the GI service line after 5pm on Weekdays and anytime on Weekends: [phone].      Roseanna Tomas MD  PGY - 5 Gastroenterology Fellow   [facility]     Assessment and Plan  Case of a 78 year old male patient with history of found to have elevated LFTs on blood work and evidence o      Elevated LFTs: Likely Secondary to Acute Liver Injury  Rule Out Viral Infection (Adenovirus) VS Bacterial Infection (Campylobacter) in Setting of Diarrhea  Rule Out DILI (Statin)  Concern for Liver Cirrhosis on CT pending US; History of Alcohol Use (MELD 3- 17; Child A 6)  No Evidence of Portal HTN on Imaging  No Anemia or Thrombocytopenia; No SPM  No Ascites  Unknown History of Esophageal Varices (no prior EGD)  No Hepatic Encephalopathy   * History of ethanol use: used to drink [employer] 1-2 shots daily from 6981-9730 then switched to occasional beer drinking (drank 3 beers in last years; last beer was on MARIANN); notes snorting cocaine in past; marijuana use in past; denies multiple sexual partners (wife passed away)  * No CLD workup   * No previous abdominal imaging  * LFTs 2.6/596/304/84 on 09/16 -> 2.1/485/260/70 on 09/17 (previous LFTs 05/30/2024 0.4/92/30/22)  * INR 1.06 on 09/17  * RUQ US 09/18 cirrhosis; normal CBD; no ascites  * CT Abdomen and Pelvis No Cont (09.16.24 @ 14:51) Dense irregularity within the urinary bladder measuring up to 5.3 cm concerning for an underlying lesion. No hydronephrosis. Prominent retroperitoneal lymph nodes measuring up to 1 cm. Cirrhotic morphology of the liver. Hepatic steatosis.  * No previous EGD; last colonoscopy many years ago per patient (unremarkable per patient)  * No FHx of liver diseases or GI cancers    RECOMMENDATIONS  - Trend hepatic function panel and INR daily for MELD Score calculation  - In case of no downtrending in T-bili, will recommend MR abdomen  - Check Ca 19-9 and AFP; check HIV  - Avoid hepatotoxic agents: agree with holding Atorvastatin 20mg for now  - Check TTE  - Check CLD workup: TOVA, AMA, ASMA, soluble microsomal Ab, and Ig panel  - For diarrhea since 09/07: GI PCR - on 09/17; follow up stool culture, elastase, fat (received on 09/17); check C difficile PCR/toxin and stool ova parasites. Received Zosyn/Rocephin and flagyl until 09/18. Currently monitoring off Abs  - For Esophageal varices screening: recommend outpatient EGD. Off B Blockers  - No Ascites on current exam/imaging. Recommend serial exams and tap when possible. Off diuretics  - Monitor for Hepatic encephalopathy: avoid constipation; avoid sedatives and limit opioids   - For HCC screening: Please f/u as outpatient for US abdomen and AFP every 6 months.  - For Vaccinations: Please f/u in clinic for being uptodate with HAV/HBV/Influenza/Pneumococcal vaccines.  - Lifestyle/Dietary modifications: Calorie intake 25-40 Kcal/kg/day; Protein intake: 1.2-1.5 gm/kg/day  - Avoid smoking and NSAIDS  - Abstain from alcohol and all illicit drugs  - Avoid use of herbal and dietary supplements due to potential hepatotoxicity. Limit use of acetaminophen to <2 grams per day. Avoid use of nonsteroidal antiinflammatory drugs (NSAIDs) . Avoid eating any unpasteurized dairy products; avoid eating any raw or undercooked eggs, fish, poultry, or meat; and avoid eating raw/steamed oysters or other shellfish to avoid risk of Vibrio infection.    - Follow up with our GI [facility]atology [facility] located at [address] [place] [address], [facility]. Telephone No: [phone]      Thank you for your consult.  - Please note that plan was communicated with medical team.   - Please reach GI on [phone] during weekdays till 5pm.  - Please call the GI service line after 5pm on Weekdays and anytime on Weekends: [phone].      Roseanna Tomas MD  PGY - 5 Gastroenterology Fellow   [facility]     Assessment and Plan  Case of a 78 year old male patient with history of HTN, DL, nephrolithiasis, and pre DM, was found to have elevated LFTs on blood work and evidence of cirrhosis on imaging. We are consulted for concern of cirrhosis.      Elevated LFTs: Likely Secondary to Acute Liver Injury  Rule Out Viral Infection (Adenovirus) VS Bacterial Infection (Campylobacter) in Setting of Diarrhea  Rule Out DILI (Statin)  Concern for Liver Cirrhosis on CT pending US; History of Alcohol Use (MELD 3- 17; Child A 6)  No Evidence of Portal HTN on Imaging  No Anemia or Thrombocytopenia; No SPM  No Ascites  Unknown History of Esophageal Varices (no prior EGD)  No Hepatic Encephalopathy   * History of ethanol use: used to drink [employer] 1-2 shots daily from 4642-4647 then switched to occasional beer drinking (drank 3 beers in last years; last beer was on MARIANN); notes snorting cocaine in past; marijuana use in past; denies multiple sexual partners (wife passed away)  * No CLD workup   * No previous abdominal imaging  * LFTs 2.6/596/304/84 on 09/16 -> 2.1/485/260/70 on 09/17 (previous LFTs 05/30/2024 0.4/92/30/22)  * INR 1.06 on 09/17  * RUQ US 09/18 cirrhosis; normal CBD; no ascites  * CT Abdomen and Pelvis No Cont (09.16.24 @ 14:51) Dense irregularity within the urinary bladder measuring up to 5.3 cm concerning for an underlying lesion. No hydronephrosis. Prominent retroperitoneal lymph nodes measuring up to 1 cm. Cirrhotic morphology of the liver. Hepatic steatosis.  * No previous EGD; last colonoscopy many years ago per patient (unremarkable per patient)  * No FHx of liver diseases or GI cancers    RECOMMENDATIONS  - Trend hepatic function panel and INR daily for MELD Score calculation  - In case of no downtrending in T-bili, will recommend MR abdomen  - Check Ca 19-9 and AFP; check HIV  - Avoid hepatotoxic agents: agree with holding Atorvastatin 20mg for now  - Check TTE  - Check CLD workup: TOVA, AMA, ASMA, soluble microsomal Ab, and Ig panel  - For diarrhea since 09/07: GI PCR - on 09/17; follow up stool culture, elastase, fat (received on 09/17); check C difficile PCR/toxin and stool ova parasites. Received Zosyn/Rocephin and flagyl until 09/18. Currently monitoring off Abs  - For Esophageal varices screening: recommend outpatient EGD. Off B Blockers  - No Ascites on current exam/imaging. Recommend serial exams and tap when possible. Off diuretics  - Monitor for Hepatic encephalopathy: avoid constipation; avoid sedatives and limit opioids   - For HCC screening: Please f/u as outpatient for US abdomen and AFP every 6 months.  - For Vaccinations: Please f/u in clinic for being uptodate with HAV/HBV/Influenza/Pneumococcal vaccines.  - Lifestyle/Dietary modifications: Calorie intake 25-40 Kcal/kg/day; Protein intake: 1.2-1.5 gm/kg/day  - Avoid smoking and NSAIDS  - Abstain from alcohol and all illicit drugs  - Avoid use of herbal and dietary supplements due to potential hepatotoxicity. Limit use of acetaminophen to <2 grams per day. Avoid use of nonsteroidal antiinflammatory drugs (NSAIDs) . Avoid eating any unpasteurized dairy products; avoid eating any raw or undercooked eggs, fish, poultry, or meat; and avoid eating raw/steamed oysters or other shellfish to avoid risk of Vibrio infection.    - Follow up with our GI Hepatology [facility] located at [address] [place] [address], [facility]. Telephone No: [phone]      Thank you for your consult.  - Please note that plan was communicated with medical team.   - Please reach GI on [phone] during weekdays till 5pm.  - Please call the GI service line after 5pm on Weekdays and anytime on Weekends: [phone].      Roseanna Tomas MD  PGY - 5 Gastroenterology Fellow   [facility]

## 2024-09-18 NOTE — PROGRESS NOTE ADULT - SUBJECTIVE AND OBJECTIVE BOX
SUBJECTIVE/OVERNIGHT EVENTS  Today is hospital day 2d. This morning patient was seen and examined at bedside. 10 episodes of loose non bloody stool over night. Continued abdominal pain.    MEDICATIONS  STANDING MEDICATIONS  amLODIPine   Tablet 10 milliGRAM(s) Oral daily  heparin   Injectable 5000 Unit(s) SubCutaneous every 12 hours  lactated ringers. 1000 milliLiter(s) IV Continuous <Continuous>  lactobacillus acidophilus 1 Tablet(s) Oral daily  metoprolol tartrate 25 milliGRAM(s) Oral daily    PRN MEDICATIONS  acetaminophen     Tablet .. 650 milliGRAM(s) Oral every 6 hours PRN    VITALS  T(F): 98.1 (09-18-24 @ 12:07), Max: 98.1 (09-17-24 @ 18:17)  HR: 97 (09-18-24 @ 12:07) (78 - 97)  BP: 119/65 (09-18-24 @ 12:07) (112/62 - 130/62)  RR: 18 (09-18-24 @ 12:07) (17 - 18)  SpO2: 95% (09-18-24 @ 12:07) (95% - 97%)    PHYSICAL EXAM  GENERAL  (x  ) NAD, lying in bed comfortably     (  ) obtunded     (  ) lethargic     (  ) somnolent    HEAD  ( x ) Atraumatic     (  ) hematoma     (  ) laceration (specify location:       )     NECK  ( x ) Supple     (  ) neck stiffness     (  ) nuchal rigidity     (  )  no JVD     (  ) JVD present ( -- cm)    HEART  Rate -->  ( x ) normal rate    (  ) bradycardic    (  ) tachycardic  Rhythm -->  (  ) regular    (  ) regularly irregular    (  ) irregularly irregular  Murmurs -->  (  ) normal s1/s2    (  ) systolic murmur    (  ) diastolic murmur    (  ) continuous murmur     (  ) S3 present    (  ) S4 present    LUNGS  ( x )Unlabored respirations     (  ) tachypnea  (  ) B/L air entry     (  ) decreased breath sounds in:  (location     )    (  ) no adventitious sound     (  ) crackles     (  ) wheezing      (  ) rhonchi      (specify location:       )  (  ) chest wall tenderness (specify location:       )    ABDOMEN  (  ) Soft     (  ) tense   |   (  ) nondistended     (x  ) distended   |   (  ) +BS     (  ) hypoactive bowel sounds     (  ) hyperactive bowel sounds  (  ) nontender     (x  ) RUQ tenderness     (  ) RLQ tenderness     (  ) LLQ tenderness     (  ) epigastric tenderness     (  ) diffuse tenderness  (  ) Splenomegaly      (  ) Hepatomegaly      (  ) Jaundice     (  ) ecchymosis     EXTREMITIES  ( x ) Normal     (  ) Rash     (  ) ecchymosis     (  ) varicose veins      (  ) pitting edema     (  ) non-pitting edema   (  ) ulceration     (  ) gangrene:     (location:     )    NERVOUS SYSTEM  ( x ) A&Ox3     (  ) confused     (  ) lethargic  CN II-XII:     (  ) Intact     (  ) focal deficits  (Specify:     )   Upper extremities:     (  ) strength X/5     (  ) focal deficit (specify:    )  Lower extremities:     (  ) strength  X/5    (  ) focal deficit (specify:    )    SKIN  (  ) No rashes or lesions     (  ) maculopapular rash     (  ) pustules     (  ) vesicles     (  ) ulcer     (  ) ecchymosis     (specify location:     )    (  ) Indwelling [place] Catheter   Date insterted:    Reason (  ) Critical illness     (  ) urinary retention    (  ) Accurate Ins/Outs Monitoring     (  ) CMO patient    (  ) Central Line  Date inserted:  Location: (  ) Right IJ   (  ) Left IJ   (  ) Right Fem   (  ) Left Fem    (  ) SPC  (  ) pigtail  (  ) PEG tube  (  ) colostomy  (  ) jejunostomy  (  ) U-Dall    LABS             12.4   11.05 )-----------( 309      ( 09-18-24 @ 05:07 )             35.4     140  |  102  |  43  -------------------------<  59   09-18-24 @ 05:07  4.3  |  16  |  1.9    Ca      9.6     09-18-24 @ 05:07  Phos   3.6     09-18-24 @ 05:07  Mg     2.3     09-18-24 @ 05:07    TPro  5.6  /  Alb  3.7  /  TBili  2.1  /  DBili  x   /  AST  260  /  ALT  70  /  AlkPhos  485  /  GGT  x     09-17-24 @ 09:00    PT/INR - ( 09-17-24 @ 09:00 )   PT: 12.10 sec;   INR: 1.06 ratio  PTT - ( 09-17-24 @ 09:00 )  PTT:30.6 sec    Troponin T, High Sensitivity Result: 47 ng/L (09-16-24 @ 17:47)  Troponin T, High Sensitivity Result: 44 ng/L (09-16-24 @ 15:30)  Pro-Brain Natriuretic Peptide: 452 pg/mL (09-16-24 @ 15:30)    Urinalysis Basic - ( 18 Sep 2024 05:07 )    Color: x / Appearance: x / SG: x / pH: x  Gluc: 59 mg/dL / Ketone: x  / Bili: x / Urobili: x   Blood: x / Protein: x / Nitrite: x   Leuk Esterase: x / RBC: x / WBC x   Sq Epi: x / Non Sq Epi: x / Bacteria: x          Culture - Blood (collected 16 Sep 2024 14:37)  Source: .Blood Blood-Peripheral  Preliminary Report (17 Sep 2024 22:01):    No growth at 24 hours    Culture - Blood (collected 16 Sep 2024 14:32)  Source: .Blood Blood-Peripheral  Preliminary Report (17 Sep 2024 22:01):    No growth at 24 hours    Urinalysis with Rflx Culture (collected 16 Sep 2024 11:35)    Culture - Urine (collected 16 Sep 2024 11:35)  Source: Clean Catch None  Final Report (17 Sep 2024 23:33):    <10,000 CFU/mL Normal Urogenital Larissa      IMAGING  CT Abdomen and Pelvis No Cont (09.16.24 @ 14:51)  IMPRESSION:    No bowel obstruction.    Dense irregularity within the urinary bladder measuring up to 5.3 cm   concerning for an underlying lesion. No hydronephrosis.    Prominent retroperitoneal lymph nodes measuring up to 1 cm.    Cirrhotic morphology of the liver. Hepatic steatosis.    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    Additional findings are detailed in the body of report.    Dr. Chowdhury spoke to Dr. Mueller at 3:19 PM on 9/16/2024 regarding   findings with read back.

## 2024-09-18 NOTE — PROGRESS NOTE ADULT - SUBJECTIVE AND OBJECTIVE BOX
Patient is a 78y old  Male who presents with a chief complaint of diarrhea (24)      Pt seen and examined at bedside. No CP or SOB. pt with 6 loose bowel movements overnight.          PAST MEDICAL & SURGICAL HISTORY:  HTN (hypertension)    HLD (hyperlipidemia)    Kidney stone    No significant past surgical history        VITAL SIGNS (Last 24 hrs):  T(C): 36.7 (24 @ 12:07), Max: 36.7 (24 @ 18:17)  HR: 97 (24 @ 12:07) (78 - 97)  BP: 119/65 (24 @ 12:07) (112/62 - 130/62)  RR: 18 (24 @ 12:07) (17 - 18)  SpO2: 95% (24 @ 12:07) (95% - 97%)  Wt(kg): --  Daily Height in cm: 177.8 (17 Sep 2024 18:17)    Daily Weight in k (18 Sep 2024 05:01)    I&O's Summary    17 Sep 2024 07:01  -  18 Sep 2024 07:00  --------------------------------------------------------  IN: 1440 mL / OUT: 700 mL / NET: 740 mL    18 Sep 2024 07:01  -  18 Sep 2024 12:13  --------------------------------------------------------  IN: 414 mL / OUT: 150 mL / NET: 264 mL        PHYSICAL EXAM:  GENERAL: NAD, well-developed  HEAD:  Atraumatic, Normocephalic  EYES: EOMI, PERRLA, conjunctiva and sclera clear  NECK: Supple, No JVD  CHEST/LUNG: Clear to auscultation bilaterally; No wheeze  HEART: Regular rate and rhythm; No murmurs, rubs, or gallops  ABDOMEN: Soft, Nontender, Nondistended; Bowel sounds present  EXTREMITIES:  2+ Peripheral Pulses, No clubbing, cyanosis, or edema  PSYCH: AAOx3  NEUROLOGY: non-focal  SKIN: No rashes or lesions    Labs Reviewed  Spoke to patient in regards to abnormal labs.    CBC Full  -  ( 18 Sep 2024 05:07 )  WBC Count : 11.05 K/uL  Hemoglobin : 12.4 g/dL  Hematocrit : 35.4 %  Platelet Count - Automated : 309 K/uL  Mean Cell Volume : 87.4 fL  Mean Cell Hemoglobin : 30.6 pg  Mean Cell Hemoglobin Concentration : 35.0 g/dL  Auto Neutrophil # : 8.78 K/uL  Auto Lymphocyte # : 0.97 K/uL  Auto Monocyte # : 1.06 K/uL  Auto Eosinophil # : 0.09 K/uL  Auto Basophil # : 0.04 K/uL  Auto Neutrophil % : 79.4 %  Auto Lymphocyte % : 8.8 %  Auto Monocyte % : 9.6 %  Auto Eosinophil % : 0.8 %  Auto Basophil % : 0.4 %    BMP:     @ 05:07    Blood Urea Nitrogen - 43  Calcium - 9.6  Carbond Dioxide - 16  Chloride - 102  Creatinine - 1.9  Glucose - 59  Potassium - 4.3  Sodium - 140      Hemoglobin A1c -   PT/INR - ( 17 Sep 2024 09:00 )   PT: 12.10 sec;   INR: 1.06 ratio         PTT - ( 17 Sep 2024 09:00 )  PTT:30.6 sec  Urine Culture:   @ 14:37 Urine culture: --    Culture Results:   No growth at 24 hours  Method Type: --  Organism: --  Organism Identification: --  Specimen Source: .Blood Blood-Peripheral   @ 14:32 Urine culture: --    Culture Results:   No growth at 24 hours  Method Type: --  Organism: --  Organism Identification: --  Specimen Source: .Blood Blood-Peripheral   @ 11:35 Urine culture: --    Culture Results:   <10,000 CFU/mL Normal Urogenital Larissa  Method Type: --  Organism: --  Organism Identification: --  Specimen Source: Clean Catch None        COVID Labs  CRP:      D-Dimer:            Imaging reviewed independently and reviewed read        MEDICATIONS  (STANDING):  amLODIPine   Tablet 10 milliGRAM(s) Oral daily  heparin   Injectable 5000 Unit(s) SubCutaneous every 12 hours  lactated ringers. 1000 milliLiter(s) (150 mL/Hr) IV Continuous <Continuous>  lactobacillus acidophilus 1 Tablet(s) Oral daily  metoprolol tartrate 25 milliGRAM(s) Oral daily    MEDICATIONS  (PRN):  acetaminophen     Tablet .. 650 milliGRAM(s) Oral every 6 hours PRN Severe Pain (7 - 10)

## 2024-09-18 NOTE — PROGRESS NOTE ADULT - ASSESSMENT
79 y/o male w/pmhx as noted above presenting w/diarrhea, kanwal, abd pain       #Diarrhea  #Abd pain   #transaminitis  #hepatic steatosis   #elevated alk phos  -pt has history of fentanyl patch use - could be accounting for diarrhea with w/d diarrhea  -on fentayl patch due to cervical neck pain that is chronic  - CT a/p showing cirrhotic morphology   - RUQ US  - t bili 2.1  -  ALT 70  - lipase 81  - hepatitis panel pending   - f/u stool studies   - f/u c diff PCR  - f/u bcx and ucx   - hold abx for now  - IVF 150cc/hr    #kanwal, HAGMA  -suspect HAGMA in setting of KANWAL  cr 2.4 (unknown baseline)   - kanwal likely pre-renal from dehydration     #bladder mass  - Dense irregularity within the urinary bladder measuring up to 5.3 cm   concerning for an underlying lesion. No hydronephrosis.  - urology evaluated - o/p f/u    #HTN  - restart amlodipine  - resart metoprolol  - with hold paramaters  - hold ACE in setting of KANWAL    #HLD  - hold statin due to elevated LFTs  - calculate ASCVD to see if pt requires statin at this time vs lifestyle changes, may need alternative antilipid med    #Nephrolithiasis   - notes passing kidney stones in past and had one recently   - consider US for further eval     #Bladder irregularity   f/u Urology    DVT proph: heparin sub-q   GI ppx - no ppi  discussed GOC: full code     I personally reviewed labs and imaging and ordered necessary testing/medications  I discussed care of the patient with licensed providers  I personally reviewed chart and consultant recommendations  I personally spent 50 minutes in care of patient.  79 y/o male w/pmhx as noted above presenting w/diarrhea, kanwal, abd pain       #Diarrhea  #Abd pain   #elevated LFTs  #hepatic steatosis   #elevated alk phos  -pt has history of fentanyl patch use - could be accounting for diarrhea with w/d diarrhea  -pt states two weeks when abdominal pain and diarrhea started, he had shrimp/seafood from [employer]  -on fentayl patch due to cervical neck pain that is chronic - does not want at thistime  - CT a/p showing cirrhotic morphology - which is new for patient  - RUQ US  - t bili 2.1  -  ALT 70  - lipase 81  - hepatitis panel pending   - f/u stool studies   - f/u c diff PCR  - f/u bcx and ucx   - stool ova and parasites  - hold abx for now  - IVF 150cc/hr  - obtain 5HIAA, gastrin level (off PPI), VIP level  - stool electrolytes to calculate stool osm gap  - GI consult     #kanwal, HAGMA  -suspect HAGMA in setting of KANWAL  cr 2.4 (unknown baseline)   - kanwal likely pre-renal from dehydration     #bladder mass  - Dense irregularity within the urinary bladder measuring up to 5.3 cm   concerning for an underlying lesion. No hydronephrosis.  - urology evaluated - o/p f/u    #HTN  - restart amlodipine  - resart metoprolol  - with hold paramaters  - hold ACE in setting of KANWAL    #HLD  - hold statin due to elevated LFTs  - calculate ASCVD to see if pt requires statin at this time vs lifestyle changes, may need alternative antilipid med    #Nephrolithiasis   - notes passing kidney stones in past and had one recently   - consider US for further eval     #Bladder irregularity   f/u Urology    DVT proph: heparin sub-q   GI ppx - no ppi  discussed GOC: full code     I personally reviewed labs and imaging and ordered necessary testing/medications  I discussed care of the patient with licensed providers  I personally reviewed chart and consultant recommendations  I personally spent 70 minutes in care of patient.

## 2024-09-18 NOTE — CONSULT NOTE ADULT - SUBJECTIVE AND OBJECTIVE BOX
Hepatology Initial Consult Note      Location: [facility] 3C [address] ([facility])  Patient Name: NATHANAEL MCCLENDON  Age: 78y  Gender: Male      Chief Complaint  Patient is a 78y old Male who presents with a chief complaint of diarrhea (18 Sep 2024 14:31)  Primary diagnosis of Abdominal pain      Reason for Consult  Cirrhosis  Diarrhea      History of Present Illness  78 year old male patient with history of HTN, DL, recurrent nephrolithiasis, and alcohol use in past who presented to the ED on 09/16 for evaluation of watery diarrhea, found to have evidence of KANWAL on labs and concern for cirrhosis on imaging. We are consulted for concern of cirrhosis on imaging.  Summary:  * History of ethanol use: used to drink Single Digits 1-2 shots daily from 1777-4988 then switched to occasional beer drinking (drank 3 beers in last years; last beer was on MARIANN); notes snorting cocaine in past; marijuana use in past; denies multiple sexual partners (wife passed away)  * No CLD workup   * No previous abdominal imaging  * LFTs 2.6/596/304/84 on 09/16 -> 2.1/485/260/70 on 09/17 (previous LFTs 05/30/2024 0.4/92/30/22)  * INR 1.06 on 09/17  * RUQ US 09/18 cirrhosis; normal CBD; no ascites  * CT Abdomen and Pelvis No Cont (09.16.24 @ 14:51) Dense irregularity within the urinary bladder measuring up to 5.3 cm concerning for an underlying lesion. No hydronephrosis. Prominent retroperitoneal lymph nodes measuring up to 1 cm. Cirrhotic morphology of the liver. Hepatic steatosis.  * No previous EGD; last colonoscopy many years ago per patient (unremarkable per patient)  * No FHx of liver diseases or GI cancers      Prior EGD:  as below      Prior Colonoscopy:  as below      Past Medical and Surgical History:  HTN (hypertension)    HLD (hyperlipidemia)    Kidney stone    No significant past surgical history        Home Medications:  Home Medications:  Ambien 10 mg oral tablet: 0.5 tablet orally at bedtime as needed (18 Sep 2024 10:01)  aspirin: 81 milligram(s) orally once a day (18 Sep 2024 10:01)  benazepril 10 mg oral tablet: 1 tab(s) orally once a day (18 Sep 2024 15:21)  benazepril 20 mg oral tablet: 1 tab(s) orally once a day (17 Sep 2024 03:55)  fentaNYL 25 mcg/hr transdermal film, extended release: 1 patch transdermally every 3 days as needed for  moderate pain (18 Sep 2024 10:01)  Lipitor 20 mg oral tablet: 1 tab(s) orally once a day (17 Sep 2024 03:56)  metoprolol succinate 25 mg oral tablet, extended release: 1 tab(s) orally once a day (18 Sep 2024 15:22)  metoprolol tartrate 25 mg oral tablet: 1 tab(s) orally once a day (17 Sep 2024 03:57)  multivitamin: One tablet orally once daily (18 Sep 2024 10:01)  Norvasc 10 mg oral tablet: 1 tab(s) orally once a day (17 Sep 2024 03:55)      Social History:  Tobacco: few cigarettes per week  Alcohol: as below  Drugs: as below      Allergies:  No Known Allergies      Family History:  FH: CHF (congestive heart failure) (Father)  As below          Vital Signs in the last 24 hours   Vitals Summary T(C): 36.7 (09-18-24 @ 12:07), Max: 36.7 (09-17-24 @ 18:17)  HR: 97 (09-18-24 @ 12:07) (82 - 97)  BP: 119/65 (09-18-24 @ 12:07) (115/67 - 130/62)  RR: 18 (09-18-24 @ 12:07) (17 - 18)  SpO2: 95% (09-18-24 @ 12:07) (95% - 97%)  Vent Data   Intake/ Output   09-17-24 @ 07:01  -  09-18-24 @ 07:00  --------------------------------------------------------  IN: 1440 mL / OUT: 700 mL / NET: 740 mL    09-18-24 @ 07:01  -  09-18-24 @ 16:58  --------------------------------------------------------  IN: 532 mL / OUT: 300 mL / NET: 232 mL      Measurements Height (cm): 177.8 (09-17-24 @ 18:17)  Weight (kg): 57.3 (09-17-24 @ 18:17)  BMI (kg/m2): 18.1 (09-17-24 @ 18:17)  BSA (m2): 1.72 (09-17-24 @ 18:17)      Physical Exam  * General Appearance: Alert, cooperative, interactive, oriented to time, place, and person, in no acute distress  * Eyes: PERRL, conjunctiva/corneas clear, EOM's intact, fundi benign, both eyes  * Lungs: Good bilateral air entry, normal breath sounds   * Heart: Regular Rate and Rhythm, normal S1 and S2, no audible murmur, rub, or gallop  * Abdomen: Symmetric, non-distended, soft, non-tender, bowel sounds active all four quadrants, no masses, no organomegaly (no hepatosplenomegaly)  * Extremities: no lower extremity pitting edema bilaterally; no asterexis      Investigations   Laboratory Workup      - CBC:                        12.4   11.05 )-----------( 309      ( 18 Sep 2024 05:07 )             35.4       - Hgb Trend:  12.4  09-18-24 @ 05:07  12.3  09-17-24 @ 09:00  13.7  09-16-24 @ 12:30          - Chemistry:  09-18    140  |  102  |  43[H]  ----------------------------<  59[L]  4.3   |  16[L]  |  1.9[H]    Ca    9.6      18 Sep 2024 05:07  Phos  3.6     09-18  Mg     2.3     09-18    TPro  5.6[L]  /  Alb  3.7  /  TBili  2.1[H]  /  DBili  x   /  AST  260[H]  /  ALT  70[H]  /  AlkPhos  485[H]  09-17    Liver panel trend:  TBili 2.1   /      /   ALT 70   /   AlkP 485   /   Tptn 5.6   /   Alb 3.7    /   DBili --      09-17  TBili 2.6   /      /   ALT 84   /   AlkP 596   /   Tptn 6.4   /   Alb 3.8    /   DBili --      09-16      - Coagulation Studies:  PT/INR - ( 17 Sep 2024 09:00 )   PT: 12.10 sec;   INR: 1.06 ratio         PTT - ( 17 Sep 2024 09:00 )  PTT:30.6 sec    - ABG:      - Cardiac Markers:        Microbiological Workup  Urinalysis Basic - ( 18 Sep 2024 05:07 )    Color: x / Appearance: x / SG: x / pH: x  Gluc: 59 mg/dL / Ketone: x  / Bili: x / Urobili: x   Blood: x / Protein: x / Nitrite: x   Leuk Esterase: x / RBC: x / WBC x   Sq Epi: x / Non Sq Epi: x / Bacteria: x        Culture - Blood (collected 16 Sep 2024 14:37)  Source: .Blood Blood-Peripheral  Preliminary Report (17 Sep 2024 22:01):    No growth at 24 hours    Culture - Blood (collected 16 Sep 2024 14:32)  Source: .Blood Blood-Peripheral  Preliminary Report (17 Sep 2024 22:01):    No growth at 24 hours    Urinalysis with Rflx Culture (collected 16 Sep 2024 11:35)    Culture - Urine (collected 16 Sep 2024 11:35)  Source: Clean Catch None  Final Report (17 Sep 2024 23:33):    <10,000 CFU/mL Normal Urogenital Larissa        Radiological Workup    US Abdomen Upper Quadrant Right:   ACC: [id number] EXAM:  US ABDOMEN RT UPR QUADRANT   ORDERED BY: VINH BARRON     PROCEDURE DATE:  09/18/2024          INTERPRETATION:  CLINICAL INFORMATION: Transaminitis. History of diarrhea.    COMPARISON: CT abdomen and pelvis 9/16/2024.    TECHNIQUE: Sonography of the right upper quadrant.    FINDINGS:  Liver: Nodular liver with heterogeneous echogenicity and echotexture   consistent with cirrhosis.  Bile ducts: Normal caliber. Common bile duct measures 4 mm.  Gallbladder: Within normallimits.  Pancreas: Visualized portions are within normal limits.  Right kidney: 10.6 cm. No hydronephrosis. Lower pole calculus measuring   1.2 cm.  Ascites: None.  IVC: Visualized portions are within normal limits.    IMPRESSION:  Cirrhotic liver.  Right renal lower pole calculus measuring 1.2 cm. No hydronephrosis.    --- End of Report ---          ARDEN CASTANO MD; Resident Radiologist  This document has been electronically signed.  CARMENCITA RUIZ MD; Attending Radiologist  This document has beenelectronically signed. Sep 18 2024  3:54PM (09-18-24 @ 10:56)          Current Medications  Standing Medications  amLODIPine   Tablet 10 milliGRAM(s) Oral daily  heparin   Injectable 5000 Unit(s) SubCutaneous every 12 hours  lactated ringers. 1000 milliLiter(s) (150 mL/Hr) IV Continuous <Continuous>  lactobacillus acidophilus 1 Tablet(s) Oral daily  metoprolol succinate ER 25 milliGRAM(s) Oral daily    PRN Medications  acetaminophen     Tablet .. 650 milliGRAM(s) Oral every 6 hours PRN Severe Pain (7 - 10)    Singles Doses Administered  (ADM OVERRIDE) 1 each &lt;see task&gt; GiveOnce  (ADM OVERRIDE) 1 each &lt;see task&gt; GiveOnce  (ADM OVERRIDE) 1 each &lt;see task&gt; GiveOnce  (ADM OVERRIDE) 1 each &lt;see task&gt; GiveOnce  lactated ringers Bolus 1000 milliLiter(s) IV Bolus once  metroNIDAZOLE  IVPB 500 milliGRAM(s) IV Intermittent once  morphine  - Injectable 4 milliGRAM(s) IV Push once  morphine  - Injectable 4 milliGRAM(s) IV Push Once  ondansetron Injectable 4 milliGRAM(s) IV Push once  piperacillin/tazobactam IVPB... 3.375 Gram(s) IV Intermittent once  sodium chloride 0.9% Bolus 500 milliLiter(s) IV Bolus once  sodium zirconium cyclosilicate 5 Gram(s) Oral once

## 2024-09-18 NOTE — DISCHARGE NOTE NURSING/CASE MANAGEMENT/SOCIAL WORK - NSDCPEFALRISK_GEN_ALL_CORE
For information on Fall & Injury Prevention, visit: [email or web address] OR  [email or web address] OR  https://www.cdc.gov/steadi/patient.html

## 2024-09-18 NOTE — PROGRESS NOTE ADULT - ASSESSMENT
78 y.o M with PMH of nephrolithiasis , recent passage of kidney stone , trace hematuria  called to evaluate for newly diagnosed bladder mass.   CT A/P findings:No hydronephrosis. Bilateral nonobstructing renal stones measuring 1.5 x 0.7 cm and left renal pole and 0.3 cm in the right renal lower pole   Dense irregularity within the urinary bladder measuring 5.3 x 4.6 cm. Prostate measures 3.7 cm in transverse dimension.      Plan:  Palpable bladder on PE, RN reports Frequency with small amount of urine. Please obtain Bladder Scan to evaluate PVR. IF PVR >350 cc consider CIC vs [place] placement.   Urine cytology  Consider Flomax   Pt. will need to F/U with Dr. Caldwell for further  management of bladder mass.  Please make an appointment to F/U within 1st week of discharge.

## 2024-09-18 NOTE — PROGRESS NOTE ADULT - SUBJECTIVE AND OBJECTIVE BOX
UROLOGY DAILY PROGRESS NOTE    Pt. seen and examined at bedside together with Dr Mckeon, Pt. in NAD, reports + frequency , dark urine in urinal noted. Afebrile.     MEDICATIONS  (STANDING):  amLODIPine   Tablet 10 milliGRAM(s) Oral daily  heparin   Injectable 5000 Unit(s) SubCutaneous every 12 hours  lactated ringers. 1000 milliLiter(s) (150 mL/Hr) IV Continuous <Continuous>  lactobacillus acidophilus 1 Tablet(s) Oral daily  metoprolol tartrate 25 milliGRAM(s) Oral daily    MEDICATIONS  (PRN):  acetaminophen     Tablet .. 650 milliGRAM(s) Oral every 6 hours PRN Severe Pain (7 - 10)      REVIEW OF SYSTEMS   [x] A ten-point review of systems was otherwise negative except as noted.     Vital Signs Last 24 Hrs  T(C): 36.7 (18 Sep 2024 12:07), Max: 36.7 (17 Sep 2024 18:17)  T(F): 98.1 (18 Sep 2024 12:07), Max: 98.1 (17 Sep 2024 18:17)  HR: 97 (18 Sep 2024 12:07) (78 - 97)  BP: 119/65 (18 Sep 2024 12:07) (112/62 - 130/62)  RR: 18 (18 Sep 2024 12:07) (17 - 18)  SpO2: 95% (18 Sep 2024 12:07) (95% - 97%)    Parameters below as of 18 Sep 2024 12:07  Patient On (Oxygen Delivery Method): room air        PHYSICAL EXAM:  GEN: NAD, awake and alert.  SKIN: non diaphoretic.  ABDO: Soft, moderately distended, palpable bladder, mild suprapubic tenderness to palpation   BACK: No CVAT B/L  : Non circumcised male. B/L descended testicles x 2. No lesions or palpable masses noted B/L. No meatal discharge.    EXT: ROSARIO x 4       I&O's Detail    17 Sep 2024 07:01  -  18 Sep 2024 07:00  --------------------------------------------------------  IN:    Lactated Ringers: 1200 mL    Oral Fluid: 240 mL  Total IN: 1440 mL    OUT:    Voided (mL): 700 mL  Total OUT: 700 mL    Total NET: 740 mL      18 Sep 2024 07:01  -  18 Sep 2024 12:23  --------------------------------------------------------  IN:    Oral Fluid: 414 mL  Total IN: 414 mL    OUT:    Voided (mL): 150 mL  Total OUT: 150 mL    Total NET: 264 mL    LABS:                        12.4   11.05 )-----------( 309      ( 18 Sep 2024 05:07 )             35.4     09-18    140  |  102  |  43[H]  ----------------------------<  59[L]  4.3   |  16[L]  |  1.9[H]    Ca    9.6      18 Sep 2024 05:07  Phos  3.6     09-18  Mg     2.3     09-18    TPro  5.6[L]  /  Alb  3.7  /  TBili  2.1[H]  /  DBili  x   /  AST  260[H]  /  ALT  70[H]  /  AlkPhos  485[H]  09-17    PT/INR - ( 17 Sep 2024 09:00 )   PT: 12.10 sec;   INR: 1.06 ratio         PTT - ( 17 Sep 2024 09:00 )  PTT:30.6 sec       Urinalysis with Rflx Culture (09.16.24 @ 11:35)    Urine Appearance: Cloudy   Color: Dark Yellow   Specific Gravity: 1.018   pH Urine: 5.0   Protein, Urine: 30 mg/dL   Glucose Qualitative, Urine: Negative mg/dL   Ketone - Urine: 15 mg/dL   Blood, Urine: Small   Bilirubin: Moderate   Urobilinogen: 1.0 mg/dL   Leukocyte Esterase Concentration: Trace   Nitrite: Negative      Urine Microscopic-Add On (NC) (09.16.24 @ 11:35)    Cast: 45 /LPF   Red Blood Cell - Urine: 4 /HPF   White Blood Cell - Urine: 24 /HPF   Bacteria: Occasional /HPF   Epithelial Cells: 2 /HPF        Culture - Urine (09.16.24 @ 11:35)    Specimen Source: Clean Catch None   Culture Results:   <10,000 CFU/mL Normal Urogenital Larissa        RADIOLOGY & ADDITIONAL STUDIES:  < from: CT Abdomen and Pelvis No Cont (09.16.24 @ 14:51) >    ACC: [id number] EXAM:  CT ABDOMEN AND PELVIS   ORDERED BY: FERDINAND YESI     PROCEDURE DATE:  09/16/2024          INTERPRETATION:  Clinical Indication: Tense abdomen. Diarrhea for 10 days.    Technique: Multidetector-row CT images of the abdomen and pelvis were   obtained from the xiphoid through the symphysis pubis. No contrast was   administered. Coronal and sagittal reconstructions were performed.    Comparison: None available.    Findings:    01. LIVER: Cirrhotic morphology. Hepatic steatosis.  02. SPLEEN: Normal unenhanced.  03. PANCREAS: Atrophic.  04. GALLBLADDER/BILIARY TREE: Gallbladder not definitively visualized.    No biliary duct dilatation.  05. ADRENALS: Normal.  06. KIDNEYS: Symmetric in size.  No hydronephrosis. Bilateral   nonobstructing renal stones measuring 1.5 x 0.7 cm and left renal pole   (series and 601 image 49) and 0.3 cm in the right renal lower pole   (series 3 image 142).  07. LYMPHADENOPATHY/RETROPERITONEUM: Prominent retroperitoneal lymph   nodes measuring upto 1 cm (series 4 image 43).  08. BOWEL: No bowel obstruction.  09. PELVIC VISCERA: Dense irregularity within the urinary bladder   measuring 5.3 x 4.6 cm (series 4 image 89). Prostate measures 3.7 cm in   transverse dimension (series 4 image 93).  10. PELVIC LYMPH NODES: No enlarged lymph nodes.  11. VASCULATURE: Diffuse calcified atherosclerotic disease of the aorta   and its branches. Multiple peripherally calcified vessels at the level of   the mid pancreas may represent aneurysms measuring up to 1.1 cm (series 4   image 42).  12. PERITONEUM/ABDOMINAL WALL: No gross ascites.  13. SKELETAL: Degenerative changes. Left femoral head sclerotic focus may   represent a bone island. Scoliosis.  14. LUNG BASES: Right lower lobe subpleural nodularity may represent   atelectasis (series 3 image 25). Areas of small atelectasis. Questionable   nodule versus atelectasis in the right lower lung measuring 0.9 cm   (series 3 image 26) and in the left lower lung measuring 0.9 cm (series 3   image 24). Motion artifact degrades image quality especially at the level   of the lower lungs.    IMPRESSION:    No bowel obstruction.    Dense irregularity within the urinary bladder measuring up to 5.3 cm   concerning for an underlying lesion. No hydronephrosis.    Prominent retroperitoneal lymph nodes measuring up to 1 cm.    Cirrhotic morphology of the liver. Hepatic steatosis.    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    Additional findings are detailed in the body of report.    Dr. Chowdhury spoke to Dr. Mueller at 3:19 PM on 9/16/2024 regarding   findings with read back.      --- End of Report ---    KAREN CHOWDHURY MD; Attending Radiologist  This document has been electronically signed. Sep16 2024  3:20PM    < end of copied text >

## 2024-09-19 NOTE — PROGRESS NOTE ADULT - ASSESSMENT
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  Patient ate out on 9/6 and began to experience abdominal pain and diarrhea. Denies any vomiting but endorses nausea, notes having 30-40 loose stools over the past week and over the past day he's had 3-5 loose stools. No melena, no hematochezia, describes stools as watery and filled with mucus. No prior history of this in the past, last colonoscopy was 8 years ago and was normal per patient. Notes 8/10 epigastric and periumbilical pain. Decreased appetite due to pain and diarrhea. No fevers, no chills, no sick contacts, does not endorse eating anything out of the ordinary, does not recall his meal prior to the onset of symptoms. No cp, sob, no loc, no acute vision or hearing changes. No other ros symptoms reported.   #Diarrhea  #Abd pain   #transaminitis  #hepatic steatosis   #elevated alk phos  -pt has history of fentanyl patch use - could be accounting for diarrhea with w/d diarrhea  -pt states two weeks when abdominal pain and diarrhea started, he had shrimp/seafood from [employer]  -on fentayl patch due to cervical neck pain that is chronic - does not want at thistime  - CT a/p showing cirrhotic morphology - which is new for patient  - RUQ US  - t bili 2.1  -  ALT 70  - lipase 81  - hepatitis panel negative  - f/u stool studies   - GI PCR panel negative, f/u C. Diff  - AFB ngtd, bcx ngtd, ua negative   - stool ova and parasites pending  - hold abx for now  - IVF 150cc/hr  - f/u 5HIAA, gastrin level (off PPI), VIP level  - stool electrolytes to calculate stool osm gap  - Hepatology consult noted  - CA 19-9, AFP, HIV ordered  - f/u CLD workup  - Echo ordered    #kanwal, HAGMA  -suspect HAGMA in setting of KANWAL  cr 2.4 (unknown baseline)   - kanwal likely pre-renal from dehydration     #bladder mass  - Dense irregularity within the urinary bladder measuring up to 5.3 cm   concerning for an underlying lesion. No hydronephrosis.  - urology evaluated - o/p f/u    #HTN  - restart amlodipine  - resart metoprolol  - with hold paramaters  - hold ACE in setting of KANWAL    #HLD  - hold statin due to elevated LFTs  - calculate ASCVD to see if pt requires statin at this time vs lifestyle changes, may need alternative antilipid med    #Nephrolithiasis   - notes passing kidney stones in past and had one recently   - consider US for further eval     #Bladder mass on CTAP  f/u Urology  - ua, ucx, urine cytology    DVT proph: heparin sub-q   GI ppx - no ppi  discussed GOC: full code

## 2024-09-19 NOTE — PROGRESS NOTE ADULT - ATTENDING COMMENTS
78 year old  M with HTN, HLD, recurrent nephrolithiasis,  prior alcohol use admitted with acute gastroenteritis seen for elevation liver tests and cirrhosis on imaging. Reports eating meal with shrimp at onset.  Diarrhea improving  Alert oriented   ? icterus  Abdomen soft tender hepatomegaly    Acute gastroenteritis with KANWAL probably bacterial   Neutrophilia. On abx.   Acute hepatitis - ALP 4 x ULN AST 8 x ULN AST > ALT T bili 2.6 Could be due to gastroenteritis. Campylobacter and adenovirus can cause elevated liver tests.   Steatotic liver disease with cirrhosis hx of alcohol use - compensated    Work up for etiology of cirrhosis   In view of rising bilirubin MR abdomen w/wo.

## 2024-09-19 NOTE — PROGRESS NOTE ADULT - SUBJECTIVE AND OBJECTIVE BOX
Hepatology Follow Up Note      Location: [facility] [address] [address] ([facility])  Patient Name: NATHANAEL MCCLENDON  Age: 78y  Gender: Male      Chief Complaint  Patient is a 78y old Male who presents with a chief complaint of diarrhea (19 Sep 2024 10:33)  Primary diagnosis of Abdominal pain      Reason for Consult  Cirrhosis  Diarrhea      Progress Note  This morning patient was seen and examined at bedside.    Today is hospital day 3d.  Patient is doing fine. No acute events overnight.   Patient's appetite is adequate, and he is tolerating diet.  He notes mild nausea and abdominal cramps but denies vomiting.  He notes some improvement in watery non-bloody non-mucoid diarrhea: 12-14 episodes on 09/18 -> 4 episodes so far on 09/19.        Vital Signs in the last 24 hours   Vitals Summary T(C): 36.3 (09-19-24 @ 05:01), Max: 36.7 (09-18-24 @ 19:29)  HR: 85 (09-19-24 @ 05:01) (85 - 87)  BP: 142/70 (09-19-24 @ 05:01) (142/70 - 144/63)  RR: 18 (09-19-24 @ 05:01) (18 - 18)  SpO2: --  Vent Data   Intake/ Output   09-18-24 @ 07:01  -  09-19-24 @ 07:00  --------------------------------------------------------  IN: 2082 mL / OUT: 800 mL / NET: 1282 mL    09-19-24 @ 07:01  -  09-19-24 @ 13:58  --------------------------------------------------------  IN: 0 mL / OUT: 200 mL / NET: -200 mL        Physical Exam  * General Appearance: Alert, cooperative, interactive, oriented to time, place, and person, in no acute distress  * Eyes: PERRL, conjunctiva/corneas clear, EOM's intact, fundi benign, both eyes  * Lungs: Good bilateral air entry, normal breath sounds   * Heart: Regular Rate and Rhythm, normal S1 and S2, no audible murmur, rub, or gallop  * Abdomen: Symmetric, non-distended, soft, non-tender, bowel sounds active all four quadrants, no masses, no organomegaly (no hepatosplenomegaly)  * Extremities: no lower extremity pitting edema bilaterally; no asterexis      Investigations   Laboratory Workup      - CBC:                        12.3   12.29 )-----------( 271      ( 19 Sep 2024 04:53 )             34.3       - Hgb Trend:  12.3  09-19-24 @ 04:53  12.4  09-18-24 @ 05:07  12.3  09-17-24 @ 09:00          - Chemistry:  09-19    142  |  105  |  29[H]  ----------------------------<  78  4.2   |  18  |  1.3    Ca    10.2      19 Sep 2024 04:53  Phos  2.6     09-19  Mg     2.0     09-19    TPro  5.6[L]  /  Alb  3.3[L]  /  TBili  3.1[H]  /  DBili  x   /  AST  259[H]  /  ALT  60[H]  /  AlkPhos  485[H]  09-19    Liver panel trend:  TBili 3.1   /      /   ALT 60   /   AlkP 485   /   Tptn 5.6   /   Alb 3.3    /   DBili --      09-19  TBili 2.1   /      /   ALT 70   /   AlkP 485   /   Tptn 5.6   /   Alb 3.7    /   DBili --      09-17  TBili 2.6   /      /   ALT 84   /   AlkP 596   /   Tptn 6.4   /   Alb 3.8    /   DBili --      09-16      - Coagulation Studies:      - ABG:      - Cardiac Markers:        Microbiological Workup  Urinalysis Basic - ( 19 Sep 2024 04:53 )    Color: x / Appearance: x / SG: x / pH: x  Gluc: 78 mg/dL / Ketone: x  / Bili: x / Urobili: x   Blood: x / Protein: x / Nitrite: x   Leuk Esterase: x / RBC: x / WBC x   Sq Epi: x / Non Sq Epi: x / Bacteria: x        Culture - Acid Fast - Stool w/Smear (collected 17 Sep 2024 23:25)  Source: .Stool cup    Culture - Blood (collected 16 Sep 2024 14:37)  Source: .Blood Blood-Peripheral  Preliminary Report (18 Sep 2024 22:01):    No growth at 48 Hours    Culture - Blood (collected 16 Sep 2024 14:32)  Source: .Blood Blood-Peripheral  Preliminary Report (18 Sep 2024 22:01):    No growth at 48 Hours        Radiological Workup    US Abdomen Upper Quadrant Right:   ACC: [id number] EXAM:  US ABDOMEN RT UPR QUADRANT   ORDERED BY: VINH BARRON     PROCEDURE DATE:  09/18/2024          INTERPRETATION:  CLINICAL INFORMATION: Transaminitis. History of diarrhea.    COMPARISON: CT abdomen and pelvis 9/16/2024.    TECHNIQUE: Sonography of the right upper quadrant.    FINDINGS:  Liver: Nodular liver with heterogeneous echogenicity and echotexture   consistent with cirrhosis.  Bile ducts: Normal caliber. Common bile duct measures 4 mm.  Gallbladder: Within normallimits.  Pancreas: Visualized portions are within normal limits.  Right kidney: 10.6 cm. No hydronephrosis. Lower pole calculus measuring   1.2 cm.  Ascites: None.  IVC: Visualized portions are within normal limits.    IMPRESSION:  Cirrhotic liver.  Right renal lower pole calculus measuring 1.2 cm. No hydronephrosis.    --- End of Report ---          ARDEN CASTANO MD; Resident Radiologist  This document has been electronically signed.  CARMENCITA RUIZ MD; Attending Radiologist  This document has beenelectronically signed. Sep 18 2024  3:54PM (09-18-24 @ 10:56)          Current Medications  Standing Medications  amLODIPine   Tablet 10 milliGRAM(s) Oral daily  heparin   Injectable 5000 Unit(s) SubCutaneous every 12 hours  lactated ringers. 1000 milliLiter(s) (150 mL/Hr) IV Continuous <Continuous>  lactobacillus acidophilus 1 Tablet(s) Oral daily  metoprolol succinate ER 25 milliGRAM(s) Oral daily    PRN Medications  acetaminophen     Tablet .. 650 milliGRAM(s) Oral every 6 hours PRN Severe Pain (7 - 10)    Singles Doses Administered  (ADM OVERRIDE) 1 each &lt;see task&gt; GiveOnce  (ADM OVERRIDE) 1 each &lt;see task&gt; GiveOnce  (ADM OVERRIDE) 1 each &lt;see task&gt; GiveOnce  (ADM OVERRIDE) 1 each &lt;see task&gt; GiveOnce  lactated ringers Bolus 1000 milliLiter(s) IV Bolus once  metroNIDAZOLE  IVPB 500 milliGRAM(s) IV Intermittent once  morphine  - Injectable 4 milliGRAM(s) IV Push once  morphine  - Injectable 4 milliGRAM(s) IV Push Once  ondansetron Injectable 4 milliGRAM(s) IV Push once  piperacillin/tazobactam IVPB... 3.375 Gram(s) IV Intermittent once  sodium chloride 0.9% Bolus 500 milliLiter(s) IV Bolus once  sodium zirconium cyclosilicate 5 Gram(s) Oral once

## 2024-09-19 NOTE — PROGRESS NOTE ADULT - ASSESSMENT
Assessment and Plan  Case of a 78 year old male patient with history of found to have elevated LFTs on blood work and evidence o      Elevated LFTs: Likely Secondary to Acute Liver Injury  Rule Out Viral Infection (Adenovirus) VS Bacterial Infection (Campylobacter) in Setting of Diarrhea  Rule Out DILI (Statin)  Concern for Liver Cirrhosis; History of Alcohol Use (MELD 3- 17; Child A 6)  No Evidence of Portal HTN on Imaging  No Anemia or Thrombocytopenia; No SPM  No Ascites  Unknown History of Esophageal Varices (no prior EGD)  No Hepatic Encephalopathy   * History of ethanol use: used to drink Panjo 1-2 shots daily from 3022-1221 then switched to occasional beer drinking (drank 3 beers in last years; last beer was on MARIANN); notes snorting cocaine in past; marijuana use in past; denies multiple sexual partners (wife passed away)  * No CLD workup   * No previous abdominal imaging  * LFTs 2.6/596/304/84 on 09/16 -> 2.1/485/260/70 on 09/17 -> 3.1/485/259/60 on 09/19 (previous LFTs 05/30/2024 0.4/92/30/22)  * INR 1.06 on 09/17; LA 4.4 on 09/16 -> 1.9 on 09/17  * RUQ US 09/18 cirrhosis; normal CBD; no ascites  * CT Abdomen and Pelvis No Cont (09.16.24 @ 14:51) Dense irregularity within the urinary bladder measuring up to 5.3 cm concerning for an underlying lesion. No hydronephrosis. Prominent retroperitoneal lymph nodes measuring up to 1 cm. Cirrhotic morphology of the liver. Hepatic steatosis.  * No previous EGD; last colonoscopy many years ago per patient (unremarkable per patient)  * No FHx of liver diseases or GI cancers    RECOMMENDATIONS  - Trend hepatic function panel and INR daily for MELD Score calculation  - Check MR abdomen with without contrast (liver protocol)  - Check Ca 19-9 and AFP; check HIV  - Avoid hepatotoxic agents: agree with holding Atorvastatin 20mg for now  - Check TTE  - Check CLD workup: TOVA, AMA, ASMA, soluble microsomal Ab, and Ig panel  - For diarrhea since 09/07: GI PCR - on 09/17; follow up stool culture, elastase, fat (received on 09/17); check C difficile PCR/toxin and stool ova parasites. Received Zosyn/Rocephin and flagyl until 09/18. Currently monitoring off Abs  - For Esophageal varices screening: recommend outpatient EGD. Off B Blockers  - No Ascites on current exam/imaging. Recommend serial exams and tap when possible. Off diuretics  - Monitor for Hepatic encephalopathy: avoid constipation; avoid sedatives and limit opioids   - For HCC screening: Please f/u as outpatient for US abdomen and AFP every 6 months.  - For Vaccinations: Please f/u in clinic for being uptodate with HAV/HBV/Influenza/Pneumococcal vaccines.  - Lifestyle/Dietary modifications: Calorie intake 25-40 Kcal/kg/day; Protein intake: 1.2-1.5 gm/kg/day  - Avoid smoking and NSAIDS  - Abstain from alcohol and all illicit drugs  - Avoid use of herbal and dietary supplements due to potential hepatotoxicity. Limit use of acetaminophen to <2 grams per day. Avoid use of nonsteroidal antiinflammatory drugs (NSAIDs) . Avoid eating any unpasteurized dairy products; avoid eating any raw or undercooked eggs, fish, poultry, or meat; and avoid eating raw/steamed oysters or other shellfish to avoid risk of Vibrio infection.    - Follow up with our [facility] located at [address] [place] [address], [facility]. Telephone No: [phone]      Thank you for your consult.  - Please note that plan was communicated with medical team.   - Please reach GI on [phone] during weekdays till 5pm.  - Please call the GI service line after 5pm on Weekdays and anytime on Weekends: [phone].      Roseanna Tomas MD  PGY - 5 Gastroenterology Fellow   [facility]   Assessment and Plan  Case of a 78 year old male patient with history of HTN, DL, nephrolithiasis, and pre DM, was found to have elevated LFTs on blood work and evidence of cirrhosis on imaging. We are consulted for concern of cirrhosis.      Elevated LFTs: Likely Secondary to Acute Liver Injury  Rule Out Viral Infection (Adenovirus) VS Bacterial Infection (Campylobacter) in Setting of Diarrhea  Rule Out DILI (Statin)  Concern for Liver Cirrhosis; History of Alcohol Use (MELD 3- 17; Child A 6)  No Evidence of Portal HTN on Imaging  No Anemia or Thrombocytopenia; No SPM  No Ascites  Unknown History of Esophageal Varices (no prior EGD)  No Hepatic Encephalopathy   * History of ethanol use: used to drink Casabi 1-2 shots daily from 3428-3148 then switched to occasional beer drinking (drank 3 beers in last years; last beer was on MARIANN); notes snorting cocaine in past; marijuana use in past; denies multiple sexual partners (wife passed away)  * No CLD workup   * No previous abdominal imaging  * LFTs 2.6/596/304/84 on 09/16 -> 2.1/485/260/70 on 09/17 -> 3.1/485/259/60 on 09/19 (previous LFTs 05/30/2024 0.4/92/30/22)  * INR 1.06 on 09/17; LA 4.4 on 09/16 -> 1.9 on 09/17  * RUQ US 09/18 cirrhosis; normal CBD; no ascites  * CT Abdomen and Pelvis No Cont (09.16.24 @ 14:51) Dense irregularity within the urinary bladder measuring up to 5.3 cm concerning for an underlying lesion. No hydronephrosis. Prominent retroperitoneal lymph nodes measuring up to 1 cm. Cirrhotic morphology of the liver. Hepatic steatosis.  * No previous EGD; last colonoscopy many years ago per patient (unremarkable per patient)  * No FHx of liver diseases or GI cancers    RECOMMENDATIONS  - Trend hepatic function panel and INR daily for MELD Score calculation  - Check MR abdomen with without contrast (liver protocol)  - Check Ca 19-9 and AFP; check HIV  - Avoid hepatotoxic agents: agree with holding Atorvastatin 20mg for now  - Check TTE  - Check CLD workup: TOVA, AMA, ASMA, soluble microsomal Ab, and Ig panel  - For diarrhea since 09/07: GI PCR - on 09/17; follow up stool culture, elastase, fat (received on 09/17); check C difficile PCR/toxin and stool ova parasites. Received Zosyn/Rocephin and flagyl until 09/18. Currently monitoring off Abs  - For Esophageal varices screening: recommend outpatient EGD. Off B Blockers  - No Ascites on current exam/imaging. Recommend serial exams and tap when possible. Off diuretics  - Monitor for Hepatic encephalopathy: avoid constipation; avoid sedatives and limit opioids   - For HCC screening: Please f/u as outpatient for US abdomen and AFP every 6 months.  - For Vaccinations: Please f/u in clinic for being uptodate with HAV/HBV/Influenza/Pneumococcal vaccines.  - Lifestyle/Dietary modifications: Calorie intake 25-40 Kcal/kg/day; Protein intake: 1.2-1.5 gm/kg/day  - Avoid smoking and NSAIDS  - Abstain from alcohol and all illicit drugs  - Avoid use of herbal and dietary supplements due to potential hepatotoxicity. Limit use of acetaminophen to <2 grams per day. Avoid use of nonsteroidal antiinflammatory drugs (NSAIDs) . Avoid eating any unpasteurized dairy products; avoid eating any raw or undercooked eggs, fish, poultry, or meat; and avoid eating raw/steamed oysters or other shellfish to avoid risk of Vibrio infection.    - Follow up with our [facility] located at [address] [place] [address], [facility]. Telephone No: [phone]      Thank you for your consult.  - Please note that plan was communicated with medical team.   - Please reach GI on [phone] during weekdays till 5pm.  - Please call the GI service line after 5pm on Weekdays and anytime on Weekends: [phone].      Roseanna Tomas MD  PGY - 5 Gastroenterology Fellow   [facility]

## 2024-09-19 NOTE — PROGRESS NOTE ADULT - ATTENDING COMMENTS
79 y/o male w/pmhx as noted above presenting w/diarrhea, yordy, abd pain    Vitals: HD stable, O2 stable  Gen: appropriate affect, no acute distress  Neuro: no focal defects, no sensory deficits  HEENT: EOMI, no JVD, normocephalic, atraumatic, no scleral icterus  Cardio: RRR, S1 S2 present, no murmurs, rubs, or gallops  Resp: lungs b/l CTA, chest wall intact  Abd: soft, nondistended, nontender  MSK: no gross joint abnormalities, no obvious swelling  Skin: no rashes or wounds  Heme: no ecchymosis or petechiae present    new cirrhosis  -CT a/p showing cirrhotic morphology - which is new for patient  -appreciate GI recommendations  -MRI liver protocol pending   -CA 19-9, AFP, HIV  -CLD workup: TOVA, AMA, ASMA, soluble microsomal Ab, and Ig panel  -AFB ngtd, blood culture pending  -hepatitis pending  -stool electrolytes pending  -echo ordered    diarrhea  -f/u 5HIAA, gastrin level, VIP level  -GI PCR pending  -fecal calprotectin  -fecal fat  -stool electrolytes to calculate stool osm gap  -stool ova and parasites    YORDY, HAGMA  -Yordy resolved  -AG reducing     HTN, HLD, Bladder mass   rest of problems I agree with and as above    DVT proph: heparin sub-q   GI ppx - no ppi  discussed GOC: full code   Dispo - DG to med srug    I personally reviewed labs and imaging and ordered necessary testing/medications  I discussed care of the patient with licensed providers (hepatology)  I personally reviewed chart and consultant recommendations  Pt has complex medical issues that require extensive diagnosis and intervention / threat to life  I personally spent 50 minutes in care of patient. 77 y/o male w/pmhx as noted above presenting w/diarrhea, yordy, abd pain    Vitals: HD stable, O2 stable  Gen: appropriate affect, no acute distress  Neuro: no focal defects, no sensory deficits  HEENT: EOMI, no JVD, normocephalic, atraumatic, no scleral icterus  Cardio: RRR, S1 S2 present, no murmurs, rubs, or gallops  Resp: lungs b/l CTA, chest wall intact  Abd: soft, nondistended, nontender  MSK: no gross joint abnormalities, no obvious swelling  Skin: no rashes or wounds  Heme: no ecchymosis or petechiae present    new cirrhosis  -CT a/p showing cirrhotic morphology - which is new for patient  -appreciate GI recommendations  -MRI liver protocol pending   -CA 19-9, AFP, HIV  -CLD workup: TOVA, AMA, ASMA, soluble microsomal Ab, and Ig panel  -AFB ngtd, blood culture pending  -hepatitis panel negative  -stool electrolytes pending  -echo ordered    diarrhea  -f/u 5HIAA, gastrin level, VIP level  -GI PCR pending  -fecal calprotectin  -fecal fat  -stool electrolytes to calculate stool osm gap  -stool ova and parasites    YORDY, HAGMA  -Yordy resolved  -AG reducing     HTN, HLD, Bladder mass   rest of problems I agree with and as above    DVT proph: heparin sub-q   GI ppx - no ppi  discussed GOC: full code   Dispo - DG to med srug    I personally reviewed labs and imaging and ordered necessary testing/medications  I discussed care of the patient with licensed providers (hepatology)  I personally reviewed chart and consultant recommendations  Pt has complex medical issues that require extensive diagnosis and intervention / threat to life  I personally spent 50 minutes in care of patient.

## 2024-09-19 NOTE — PROGRESS NOTE ADULT - SUBJECTIVE AND OBJECTIVE BOX
SUBJECTIVE/OVERNIGHT EVENTS  Today is hospital day 3d. This morning patient was seen and examined at bedside, resting comfortably in bed. No acute or major events overnight.    MEDICATIONS  STANDING MEDICATIONS  amLODIPine   Tablet 10 milliGRAM(s) Oral daily  heparin   Injectable 5000 Unit(s) SubCutaneous every 12 hours  lactated ringers. 1000 milliLiter(s) IV Continuous <Continuous>  lactobacillus acidophilus 1 Tablet(s) Oral daily  metoprolol succinate ER 25 milliGRAM(s) Oral daily    PRN MEDICATIONS  acetaminophen     Tablet .. 650 milliGRAM(s) Oral every 6 hours PRN    VITALS  T(F): 97.3 (09-19-24 @ 05:01), Max: 98.1 (09-18-24 @ 12:07)  HR: 85 (09-19-24 @ 05:01) (85 - 97)  BP: 142/70 (09-19-24 @ 05:01) (119/65 - 144/63)  RR: 18 (09-19-24 @ 05:01) (18 - 18)  SpO2: 95% (09-18-24 @ 12:07) (95% - 95%)    PHYSICAL EXAM  GENERAL  ( x ) NAD, lying in bed comfortably     (  ) obtunded     (  ) lethargic     (  ) somnolent    HEAD  (x  ) Atraumatic     (  ) hematoma     (  ) laceration (specify location:       )     NECK  ( x ) Supple     (  ) neck stiffness     (  ) nuchal rigidity     (  )  no JVD     (  ) JVD present ( -- cm)    HEART  Rate -->  ( x ) normal rate    (  ) bradycardic    (  ) tachycardic  Rhythm -->  (  ) regular    (  ) regularly irregular    (  ) irregularly irregular  Murmurs -->  (  ) normal s1/s2    (  ) systolic murmur    (  ) diastolic murmur    (  ) continuous murmur     (  ) S3 present    (  ) S4 present    LUNGS  (x  )Unlabored respirations     (  ) tachypnea  (  ) B/L air entry     (  ) decreased breath sounds in:  (location     )    (  ) no adventitious sound     (  ) crackles     (  ) wheezing      (  ) rhonchi      (specify location:       )  (  ) chest wall tenderness (specify location:       )    ABDOMEN  (  x) Soft     (  ) tense   |   (  ) nondistended     (  ) distended   |   (  ) +BS     (  ) hypoactive bowel sounds     (  ) hyperactive bowel sounds  (  ) nontender     (  ) RUQ tenderness     (  ) RLQ tenderness     (  ) LLQ tenderness     (  ) epigastric tenderness     (  ) diffuse tenderness  (  ) Splenomegaly      (  ) Hepatomegaly      (  ) Jaundice     (  ) ecchymosis     EXTREMITIES  (  ) Normal     (  ) Rash     (  ) ecchymosis     (  ) varicose veins      (  ) pitting edema     (  ) non-pitting edema   (  ) ulceration     (  ) gangrene:     (location:     )    NERVOUS SYSTEM  ( x ) A&Ox3     (  ) confused     (  ) lethargic  CN II-XII:     (  ) Intact     (  ) focal deficits  (Specify:     )   Upper extremities:     (  ) strength X/5     (  ) focal deficit (specify:    )  Lower extremities:     (  ) strength  X/5    (  ) focal deficit (specify:    )    SKIN  (  ) No rashes or lesions     (  ) maculopapular rash     (  ) pustules     (  ) vesicles     (  ) ulcer     (  ) ecchymosis     (specify location:     )    (  ) Indwelling [place] Catheter   Date insterted:    Reason (  ) Critical illness     (  ) urinary retention    (  ) Accurate Ins/Outs Monitoring     (  ) CMO patient    (  ) Central Line  Date inserted:  Location: (  ) Right IJ   (  ) Left IJ   (  ) Right Fem   (  ) Left Fem    (  ) SPC  (  ) pigtail  (  ) PEG tube  (  ) colostomy  (  ) jejunostomy  (  ) U-Dall    LABS             12.3   12.29 )-----------( 271      ( 09-19-24 @ 04:53 )             34.3     142  |  105  |  29  -------------------------<  78   09-19-24 @ 04:53  4.2  |  18  |  1.3    Ca      10.2     09-19-24 @ 04:53  Phos   2.6     09-19-24 @ 04:53  Mg     2.0     09-19-24 @ 04:53    TPro  5.6  /  Alb  3.3  /  TBili  3.1  /  DBili  x   /  AST  259  /  ALT  60  /  AlkPhos  485  /  GGT  x     09-19-24 @ 04:53      Troponin T, High Sensitivity Result: 47 ng/L (09-16-24 @ 17:47)  Troponin T, High Sensitivity Result: 44 ng/L (09-16-24 @ 15:30)  Pro-Brain Natriuretic Peptide: 452 pg/mL (09-16-24 @ 15:30)    Urinalysis Basic - ( 19 Sep 2024 04:53 )    Color: x / Appearance: x / SG: x / pH: x  Gluc: 78 mg/dL / Ketone: x  / Bili: x / Urobili: x   Blood: x / Protein: x / Nitrite: x   Leuk Esterase: x / RBC: x / WBC x   Sq Epi: x / Non Sq Epi: x / Bacteria: x          Culture - Acid Fast - Stool w/Smear (collected 17 Sep 2024 23:25)  Source: .Stool cup    Culture - Blood (collected 16 Sep 2024 14:37)  Source: .Blood Blood-Peripheral  Preliminary Report (18 Sep 2024 22:01):    No growth at 48 Hours    Culture - Blood (collected 16 Sep 2024 14:32)  Source: .Blood Blood-Peripheral  Preliminary Report (18 Sep 2024 22:01):    No growth at 48 Hours    Urinalysis with Rflx Culture (collected 16 Sep 2024 11:35)    Culture - Urine (collected 16 Sep 2024 11:35)  Source: Clean Catch None  Final Report (17 Sep 2024 23:33):    <10,000 CFU/mL Normal Urogenital Larissa      IMAGING SUBJECTIVE/OVERNIGHT EVENTS  Today is hospital day 3d. This morning patient was seen and examined at bedside, resting comfortably in bed. Reports continued loose stools. Abd pain has improved. No acute or major events overnight.    MEDICATIONS  STANDING MEDICATIONS  amLODIPine   Tablet 10 milliGRAM(s) Oral daily  heparin   Injectable 5000 Unit(s) SubCutaneous every 12 hours  lactated ringers. 1000 milliLiter(s) IV Continuous <Continuous>  lactobacillus acidophilus 1 Tablet(s) Oral daily  metoprolol succinate ER 25 milliGRAM(s) Oral daily    PRN MEDICATIONS  acetaminophen     Tablet .. 650 milliGRAM(s) Oral every 6 hours PRN    VITALS  T(F): 97.3 (09-19-24 @ 05:01), Max: 98.1 (09-18-24 @ 12:07)  HR: 85 (09-19-24 @ 05:01) (85 - 97)  BP: 142/70 (09-19-24 @ 05:01) (119/65 - 144/63)  RR: 18 (09-19-24 @ 05:01) (18 - 18)  SpO2: 95% (09-18-24 @ 12:07) (95% - 95%)    PHYSICAL EXAM  GENERAL  ( x ) NAD, lying in bed comfortably     (  ) obtunded     (  ) lethargic     (  ) somnolent    HEAD  (x  ) Atraumatic     (  ) hematoma     (  ) laceration (specify location:       )     NECK  ( x ) Supple     (  ) neck stiffness     (  ) nuchal rigidity     (  )  no JVD     (  ) JVD present ( -- cm)    HEART  Rate -->  ( x ) normal rate    (  ) bradycardic    (  ) tachycardic  Rhythm -->  (  ) regular    (  ) regularly irregular    (  ) irregularly irregular  Murmurs -->  (  ) normal s1/s2    (  ) systolic murmur    (  ) diastolic murmur    (  ) continuous murmur     (  ) S3 present    (  ) S4 present    LUNGS  (x  )Unlabored respirations     (  ) tachypnea  (  ) B/L air entry     (  ) decreased breath sounds in:  (location     )    (  ) no adventitious sound     (  ) crackles     (  ) wheezing      (  ) rhonchi      (specify location:       )  (  ) chest wall tenderness (specify location:       )    ABDOMEN  (  x) Soft     (  ) tense   |   (  ) nondistended     (  ) distended   |   (  ) +BS     (  ) hypoactive bowel sounds     (  ) hyperactive bowel sounds  (  ) nontender     (  ) RUQ tenderness     (  ) RLQ tenderness     (  ) LLQ tenderness     (  ) epigastric tenderness     (  ) diffuse tenderness  (  ) Splenomegaly      (  ) Hepatomegaly      (  ) Jaundice     (  ) ecchymosis     EXTREMITIES  (  ) Normal     (  ) Rash     (  ) ecchymosis     (  ) varicose veins      (  ) pitting edema     (  ) non-pitting edema   (  ) ulceration     (  ) gangrene:     (location:     )    NERVOUS SYSTEM  ( x ) A&Ox3     (  ) confused     (  ) lethargic  CN II-XII:     (  ) Intact     (  ) focal deficits  (Specify:     )   Upper extremities:     (  ) strength X/5     (  ) focal deficit (specify:    )  Lower extremities:     (  ) strength  X/5    (  ) focal deficit (specify:    )    SKIN  (  ) No rashes or lesions     (  ) maculopapular rash     (  ) pustules     (  ) vesicles     (  ) ulcer     (  ) ecchymosis     (specify location:     )    (  ) Indwelling [place] Catheter   Date insterted:    Reason (  ) Critical illness     (  ) urinary retention    (  ) Accurate Ins/Outs Monitoring     (  ) CMO patient    (  ) Central Line  Date inserted:  Location: (  ) Right IJ   (  ) Left IJ   (  ) Right Fem   (  ) Left Fem    (  ) SPC  (  ) pigtail  (  ) PEG tube  (  ) colostomy  (  ) jejunostomy  (  ) U-Dall    LABS             12.3   12.29 )-----------( 271      ( 09-19-24 @ 04:53 )             34.3     142  |  105  |  29  -------------------------<  78   09-19-24 @ 04:53  4.2  |  18  |  1.3    Ca      10.2     09-19-24 @ 04:53  Phos   2.6     09-19-24 @ 04:53  Mg     2.0     09-19-24 @ 04:53    TPro  5.6  /  Alb  3.3  /  TBili  3.1  /  DBili  x   /  AST  259  /  ALT  60  /  AlkPhos  485  /  GGT  x     09-19-24 @ 04:53      Troponin T, High Sensitivity Result: 47 ng/L (09-16-24 @ 17:47)  Troponin T, High Sensitivity Result: 44 ng/L (09-16-24 @ 15:30)  Pro-Brain Natriuretic Peptide: 452 pg/mL (09-16-24 @ 15:30)    Urinalysis Basic - ( 19 Sep 2024 04:53 )    Color: x / Appearance: x / SG: x / pH: x  Gluc: 78 mg/dL / Ketone: x  / Bili: x / Urobili: x   Blood: x / Protein: x / Nitrite: x   Leuk Esterase: x / RBC: x / WBC x   Sq Epi: x / Non Sq Epi: x / Bacteria: x          Culture - Acid Fast - Stool w/Smear (collected 17 Sep 2024 23:25)  Source: .Stool cup    Culture - Blood (collected 16 Sep 2024 14:37)  Source: .Blood Blood-Peripheral  Preliminary Report (18 Sep 2024 22:01):    No growth at 48 Hours    Culture - Blood (collected 16 Sep 2024 14:32)  Source: .Blood Blood-Peripheral  Preliminary Report (18 Sep 2024 22:01):    No growth at 48 Hours    Urinalysis with Rflx Culture (collected 16 Sep 2024 11:35)    Culture - Urine (collected 16 Sep 2024 11:35)  Source: Clean Catch None  Final Report (17 Sep 2024 23:33):    <10,000 CFU/mL Normal Urogenital Larissa      IMAGING

## 2024-09-20 NOTE — PROGRESS NOTE ADULT - ATTENDING COMMENTS
78 year old  M with HTN, HLD, recurrent nephrolithiasis,  prior alcohol use admitted with acute gastroenteritis seen for elevation liver tests and cirrhosis on imaging. Reports eating meal with shrimp at onset.    Diarrhea improving  Alert oriented   Icterus+  Abdomen soft tender hepatomegaly+    Cholestatic jaundice due to liver mets ? bladder ca  Steatotic liver disease  - cirrhosis hx of alcohol use - compensated  Acute gastroenteritis with KANWAL probably bacterial   Neutrophilia. On abx.     Oncology / urology input  Biopsy for tissue diagnosis

## 2024-09-20 NOTE — PROGRESS NOTE ADULT - ATTENDING COMMENTS
79 y/o male w/pmhx of HTN, HLD, hx of frequent nephrolithiasis with over 10 episodes in 2010, and recent passage of a stone a few days ago w/trace hematuria, cervical neck osteoarthritis previous use of opioid pain medication, presenting for 10 days of diarrhea.         #Diarrhea  - CT abd negative for enterocolitis   - GI PCR panel negative, C. 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 Multiple subcentimeter and mildly enlarged upper abdominal lymph nodes, as detailed above, suspicious for lymph node involvement. Numerous foci of osseous metastasis, as detailed above. Partially imaged 3.9 cm enhancing lobulated soft tissue mass within the urinary bladder, suspicious for neoplasm.  - Urology and IR consulted - outpatient biopsy     #KANWAL   - resolving     #HTN  - c/w home meds     #HLD  - hold statin due to elevated LFTs      DVT proph: heparin sub-q      Pending: diarrhea improvement

## 2024-09-20 NOTE — PROGRESS NOTE ADULT - ASSESSMENT
Assessment and Plan  Case of a 78 year old male patient with history of HTN, DL, nephrolithiasis, and pre DM, was found to have elevated LFTs on blood work and evidence of cirrhosis on imaging. We are consulted for concern of cirrhosis.      Elevated LFTs: Likely Secondary to Acute Liver Injury  Rule Out Viral Infection (Adenovirus) VS Bacterial Infection (Campylobacter) in Setting of Diarrhea  Rule Out DILI (Statin)  Concern for Liver Cirrhosis; History of Alcohol Use (MELD 3- 17; Child A 6)  No Evidence of Portal HTN on Imaging  No Anemia or Thrombocytopenia; No SPM  No Ascites  Unknown History of Esophageal Varices (no prior EGD)  No Hepatic Encephalopathy   * History of ethanol use: used to drink [employer] 1-2 shots daily from 2927-0422 then switched to occasional beer drinking (drank 3 beers in last years; last beer was on MARIANN); notes snorting cocaine in past; marijuana use in past; denies multiple sexual partners (wife passed away)  * No CLD workup   * No previous abdominal imaging  * LFTs 2.6/596/304/84 on 09/16 -> 2.1/485/260/70 on 09/17 -> 3.1/485/259/60 on 09/19 (previous LFTs 05/30/2024 0.4/92/30/22)  * INR 1.06 on 09/17; LA 4.4 on 09/16 -> 1.9 on 09/17  * RUQ US 09/18 cirrhosis; normal CBD; no ascites  * CT Abdomen and Pelvis No Cont (09.16.24 @ 14:51) Dense irregularity within the urinary bladder measuring up to 5.3 cm concerning for an underlying lesion. No hydronephrosis. Prominent retroperitoneal lymph nodes measuring up to 1 cm. Cirrhotic morphology of the liver. Hepatic steatosis.  * No previous EGD; last colonoscopy many years ago per patient (unremarkable per patient)  * No FHx of liver diseases or GI cancers    RECOMMENDATIONS  - Trend hepatic function panel and INR daily for MELD Score calculation  - Follow up MR abdomen with without contrast (liver protocol) performed. Tumor markers: Ca 19-9 was 49 and AFP was 2.4 on 09/19; HIV - on 09/19  - Avoid hepatotoxic agents: agree with holding Atorvastatin 20mg for now  - Check TTE  - Follow up CLD workup: TOVA, AMA, ASMA, and Ig panel (received)  - For diarrhea since 09/07: GI PCR - on 09/17 and C difficile - on 09/19; follow up stool culture, elastase, fat (received on 09/17); check stool ova parasites. Received Zosyn/Rocephin and flagyl until 09/18. Currently monitoring off Abs  - For Esophageal varices screening: recommend outpatient EGD. Off B Blockers  - No Ascites on current exam/imaging. Recommend serial exams and tap when possible. Off diuretics  - Monitor for Hepatic encephalopathy: avoid constipation; avoid sedatives and limit opioids   - For HCC screening: Please f/u as outpatient for US abdomen and AFP every 6 months.  - For Vaccinations: Please f/u in clinic for being uptodate with HAV/HBV/Influenza/Pneumococcal vaccines.  - Lifestyle/Dietary modifications: Calorie intake 25-40 Kcal/kg/day; Protein intake: 1.2-1.5 gm/kg/day  - Avoid smoking and NSAIDS  - Abstain from alcohol and all illicit drugs  - Avoid use of herbal and dietary supplements due to potential hepatotoxicity. Limit use of acetaminophen to <2 grams per day. Avoid use of nonsteroidal antiinflammatory drugs (NSAIDs) . Avoid eating any unpasteurized dairy products; avoid eating any raw or undercooked eggs, fish, poultry, or meat; and avoid eating raw/steamed oysters or other shellfish to avoid risk of Vibrio infection.    - Follow up with our [facility] located at [address] [place] [address], Formerly named [facility]. Telephone No: [phone]      Thank you for your consult.  - Please note that plan was communicated with medical team.   - Please reach GI on [phone] during weekdays till 5pm.  - Please call the GI service line after 5pm on Weekdays and anytime on Weekends: [phone].      Roseanna Tomas MD  PGY - 5 Gastroenterology Fellow   [facility]     Assessment and Plan  Case of a 78 year old male patient with history of HTN, DL, nephrolithiasis, and pre DM, was found to have elevated LFTs on blood work and evidence of cirrhosis on imaging. We are consulted for concern of cirrhosis.      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 Suspected Primary Bladder Cancer; History of Alcohol Use (MELD 3- 17; Child A 6)  Rule Out Viral Infection (Adenovirus) VS Bacterial Infection (Campylobacter) in Setting of Diarrhea  Rule Out DILI (Statin)  No Evidence of Portal HTN on Imaging  No Anemia or Thrombocytopenia; No SPM  No Ascites  Unknown History of Esophageal Varices (no prior EGD)  No Hepatic Encephalopathy   * History of ethanol use: used to drink [employer] 1-2 shots daily from 2616-1927 then switched to occasional beer drinking (drank 3 beers in last years; last beer was on MARIANN); notes snorting cocaine in past; marijuana use in past; denies multiple sexual partners (wife passed away)  * No CLD workup   * No previous abdominal imaging  * LFTs 2.6/596/304/84 on 09/16 -> 2.1/485/260/70 on 09/17 -> 3.1/485/259/60 on 09/19 (previous LFTs 05/30/2024 0.4/92/30/22)  * INR 1.06 on 09/17; LA 4.4 on 09/16 -> 1.9 on 09/17  * RUQ US 09/18 cirrhosis; normal CBD; no ascites  * CT Abdomen and Pelvis No Cont (09.16.24 @ 14:51) Dense irregularity within the urinary bladder measuring up to 5.3 cm concerning for an underlying lesion. No hydronephrosis. Prominent retroperitoneal lymph nodes measuring up to 1 cm. Cirrhotic morphology of the liver. Hepatic steatosis.  * MR abdomen 09/19 concerning for bladder mass with liver and bone mets  * No previous EGD; last colonoscopy many years ago per patient (unremarkable per patient)  * No FHx of liver diseases or GI cancers    RECOMMENDATIONS  - MR abdomen with without contrast (liver protocol) noted. Recommend urology follow up and oncology/IR evaluation for tissue sampling to confirm diagnosis of bladder cancer metastatic to liver/bone. Tumor markers: Ca 19-9 was 49 and AFP was 2.4 on 09/19; HIV - on 09/19  - Trend hepatic function panel and INR daily for MELD Score calculation  - Avoid hepatotoxic agents: agree with holding Atorvastatin 20mg for now  - Check TTE  - Follow up CLD workup: TOVA, AMA, ASMA, and Ig panel (received)  - For diarrhea since 09/07: GI PCR - on 09/17 and C difficile - on 09/19; follow up stool culture, elastase, fat (received on 09/17); check stool ova parasites. Received Zosyn/Rocephin and flagyl until 09/18. Currently monitoring off Abs  - For Esophageal varices screening: recommend outpatient EGD. Off B Blockers  - No Ascites on current exam/imaging. Recommend serial exams and tap when possible. Off diuretics  - Monitor for Hepatic encephalopathy: avoid constipation; avoid sedatives and limit opioids   - For HCC screening: Please f/u as outpatient for US abdomen and AFP every 6 months.  - For Vaccinations: Please f/u in clinic for being uptodate with HAV/HBV/Influenza/Pneumococcal vaccines.  - Lifestyle/Dietary modifications: Calorie intake 25-40 Kcal/kg/day; Protein intake: 1.2-1.5 gm/kg/day  - Avoid smoking and NSAIDS  - Abstain from alcohol and all illicit drugs  - Avoid use of herbal and dietary supplements due to potential hepatotoxicity. Limit use of acetaminophen to <2 grams per day. Avoid use of nonsteroidal antiinflammatory drugs (NSAIDs) . Avoid eating any unpasteurized dairy products; avoid eating any raw or undercooked eggs, fish, poultry, or meat; and avoid eating raw/steamed oysters or other shellfish to avoid risk of Vibrio infection.    - Follow up with our [facility] located at [address] [place] [address], [address]. Telephone No: [phone]      Thank you for your consult.  - Please note that plan was communicated with medical team.   - Please reach GI on [phone] during weekdays till 5pm.  - Please call the GI service line after 5pm on Weekdays and anytime on Weekends: [phone].      Roseanna Tomas MD  PGY - 5 Gastroenterology Fellow   [facility]

## 2024-09-20 NOTE — PROGRESS NOTE ADULT - SUBJECTIVE AND OBJECTIVE BOX
Hepatology Follow Up Note      Location: [facility] 3B 021 A ([facility]B)  Patient Name: NATHANAEL MCCLENDON  Age: 78y  Gender: Male      Chief Complaint  Patient is a 78y old Male who presents with a chief complaint of diarrhea (20 Sep 2024 08:11)  Primary diagnosis of Abdominal pain      Reason for Consult  Cirrhosis  Diarrhea      Progress Note  This morning patient was seen and examined at bedside.    Today is hospital day 4d.  Patient is doing fine. No acute events overnight.   Patient's appetite is adequate, and he is tolerating diet.  He notes mild nausea and abdominal cramps but denies vomiting.  He notes some improvement in watery non-bloody non-mucoid diarrhea: 12-14 episodes on 09/18 -> 10-12 episodes on 09/19 -> 4 so far on 09/20.        Vital Signs in the last 24 hours   Vitals Summary T(C): 36.7 (09-20-24 @ 04:23), Max: 36.8 (09-19-24 @ 19:42)  HR: 94 (09-20-24 @ 04:23) (82 - 94)  BP: 114/73 (09-20-24 @ 04:23) (114/73 - 126/67)  RR: 17 (09-20-24 @ 04:23) (17 - 18)  SpO2: 98% (09-20-24 @ 04:23) (98% - 98%)  Vent Data   Intake/ Output   09-19-24 @ 07:01  -  09-20-24 @ 07:00  --------------------------------------------------------  IN: 354 mL / OUT: 675 mL / NET: -321 mL      Physical Exam  * General Appearance: Alert, cooperative, interactive, oriented to time, place, and person, in no acute distress  * Eyes: PERRL, conjunctiva/corneas clear, EOM's intact, fundi benign, both eyes  * Lungs: Good bilateral air entry, normal breath sounds   * Heart: Regular Rate and Rhythm, normal S1 and S2, no audible murmur, rub, or gallop  * Abdomen: Symmetric, non-distended, soft, non-tender, bowel sounds active all four quadrants, no masses, no organomegaly (no hepatosplenomegaly)  * Extremities: no lower extremity pitting edema bilaterally; no asterexis        Investigations   Laboratory Workup      - CBC:                        12.3   12.29 )-----------( 271      ( 19 Sep 2024 04:53 )             34.3       - Hgb Trend:  12.3  09-19-24 @ 04:53  12.4  09-18-24 @ 05:07          - Chemistry:  09-19    142  |  105  |  29[H]  ----------------------------<  78  4.2   |  18  |  1.3    Ca    10.2      19 Sep 2024 04:53  Phos  2.6     09-19  Mg     2.0     09-19    TPro  5.6[L]  /  Alb  3.3[L]  /  TBili  3.1[H]  /  DBili  x   /  AST  259[H]  /  ALT  60[H]  /  AlkPhos  485[H]  09-19    Liver panel trend:  TBili 3.1   /      /   ALT 60   /   AlkP 485   /   Tptn 5.6   /   Alb 3.3    /   DBili --      09-19  TBili 2.1   /      /   ALT 70   /   AlkP 485   /   Tptn 5.6   /   Alb 3.7    /   DBili --      09-17  TBili 2.6   /      /   ALT 84   /   AlkP 596   /   Tptn 6.4   /   Alb 3.8    /   DBili --      09-16      - Coagulation Studies:      - ABG:      - Cardiac Markers:        Microbiological Workup  Urinalysis Basic - ( 19 Sep 2024 04:53 )    Color: x / Appearance: x / SG: x / pH: x  Gluc: 78 mg/dL / Ketone: x  / Bili: x / Urobili: x   Blood: x / Protein: x / Nitrite: x   Leuk Esterase: x / RBC: x / WBC x   Sq Epi: x / Non Sq Epi: x / Bacteria: x        Culture - Acid Fast - Stool w/Smear (collected 17 Sep 2024 23:25)  Source: .Stool cup        Radiological Workup    US Abdomen Upper Quadrant Right:   ACC: [id number] EXAM:  US ABDOMEN RT UPR QUADRANT   ORDERED BY: VINH BARRON     PROCEDURE DATE:  09/18/2024          INTERPRETATION:  CLINICAL INFORMATION: Transaminitis. History of diarrhea.    COMPARISON: CT abdomen and pelvis 9/16/2024.    TECHNIQUE: Sonography of the right upper quadrant.    FINDINGS:  Liver: Nodular liver with heterogeneous echogenicity and echotexture   consistent with cirrhosis.  Bile ducts: Normal caliber. Common bile duct measures 4 mm.  Gallbladder: Within normallimits.  Pancreas: Visualized portions are within normal limits.  Right kidney: 10.6 cm. No hydronephrosis. Lower pole calculus measuring   1.2 cm.  Ascites: None.  IVC: Visualized portions are within normal limits.    IMPRESSION:  Cirrhotic liver.  Right renal lower pole calculus measuring 1.2 cm. No hydronephrosis.    --- End of Report ---          ARDEN CASTANO MD; Resident Radiologist  This document has been electronically signed.  CARMENCITA RUIZ MD; Attending Radiologist  This document has beenelectronically signed. Sep 18 2024  3:54PM (09-18-24 @ 10:56)          Current Medications  Standing Medications  amLODIPine   Tablet 10 milliGRAM(s) Oral daily  heparin   Injectable 5000 Unit(s) SubCutaneous every 12 hours  lactobacillus acidophilus 1 Tablet(s) Oral daily  metoprolol succinate ER 25 milliGRAM(s) Oral daily    PRN Medications  acetaminophen     Tablet .. 650 milliGRAM(s) Oral every 6 hours PRN Severe Pain (7 - 10)  loperamide 2 milliGRAM(s) Oral two times a day PRN Diarrhea    Singles Doses Administered  (ADM OVERRIDE) 1 each &lt;see task&gt; GiveOnce  (ADM OVERRIDE) 1 each &lt;see task&gt; GiveOnce  (ADM OVERRIDE) 1 each &lt;see task&gt; GiveOnce  (ADM OVERRIDE) 1 each &lt;see task&gt; GiveOnce  lactated ringers Bolus 1000 milliLiter(s) IV Bolus once  metroNIDAZOLE  IVPB 500 milliGRAM(s) IV Intermittent once  morphine  - Injectable 4 milliGRAM(s) IV Push Once  morphine  - Injectable 4 milliGRAM(s) IV Push once  ondansetron Injectable 4 milliGRAM(s) IV Push once  piperacillin/tazobactam IVPB... 3.375 Gram(s) IV Intermittent once  sodium chloride 0.9% Bolus 500 milliLiter(s) IV Bolus once  sodium zirconium cyclosilicate 5 Gram(s) Oral once

## 2024-09-20 NOTE — PROGRESS NOTE ADULT - ASSESSMENT
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  Patient ate out on 9/6 and began to experience abdominal pain and diarrhea. Denies any vomiting but endorses nausea, notes having 30-40 loose stools over the past week and over the past day he's had 3-5 loose stools. No melena, no hematochezia, describes stools as watery and filled with mucus. No prior history of this in the past, last colonoscopy was 8 years ago and was normal per patient. Notes 8/10 epigastric and periumbilical pain. Decreased appetite due to pain and diarrhea. No fevers, no chills, no sick contacts, does not endorse eating anything out of the ordinary, does not recall his meal prior to the onset of symptoms. No cp, sob, no loc, no acute vision or hearing changes. No other ros symptoms reported.   #Diarrhea  #Abd pain   #transaminitis  #hepatic steatosis   #elevated alk phos  -pt has history of fentanyl patch use - could be accounting for diarrhea with w/d diarrhea  -pt states two weeks when abdominal pain and diarrhea started, he had shrimp/seafood from [employer]  -on fentayl patch due to cervical neck pain that is chronic - does not want at thistime  - CT a/p showing cirrhotic morphology - which is new for patient  - RUQ US: cirrhotic liver  - t bili 2.6 --> 2.1 --> 3.1  -  ALT 70  - lipase 81  - hepatitis panel negative  - f/u stool studies   - GI PCR panel negative, C. Diff negative  - AFB ngtd, bcx ngtd, ua negative   - stool ova and parasites pending  - hold abx for now  - IVF 150cc/hr  - f/u 5HIAA, gastrin level (off PPI), VIP level  - stool electrolytes to calculate stool osm gap  - Hepatology consult noted  - CA 19-9 +ve, AFP -ve, HIV -ve  - f/u CLD workup  - Echo ordered    #kanwal, HAGMA  -suspect HAGMA in setting of KANWAL  cr 2.4 (unknown baseline)   - kanwal likely pre-renal from dehydration     #bladder mass  - Dense irregularity within the urinary bladder measuring up to 5.3 cm   concerning for an underlying lesion. No hydronephrosis.  - urology evaluated - o/p f/u    #HTN  - restart amlodipine  - resart metoprolol  - with hold paramaters  - hold ACE in setting of KANWAL    #HLD  - hold statin due to elevated LFTs  - calculate ASCVD to see if pt requires statin at this time vs lifestyle changes, may need alternative antilipid med    #Nephrolithiasis   - notes passing kidney stones in past and had one recently   - consider US for further eval     #Bladder mass on CTAP  f/u Urology  - ua, ucx, urine cytology    DVT proph: heparin sub-q   GI ppx - no ppi  discussed GOC: full code

## 2024-09-20 NOTE — CONSULT NOTE ADULT - SUBJECTIVE AND OBJECTIVE BOX
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  Patient ate out on 9/6 and began to experience abdominal pain and diarrhea. Denies any vomiting but endorses nausea, notes having 30-40 loose stools over the past week and over the past day he's had 3-5 loose stools. No melena, no hematochezia, describes stools as watery and filled with mucus. No prior history of this in the past, last colonoscopy was 8 years ago and was normal per patient. Notes 8/10 epigastric and periumbilical pain. Decreased appetite due to pain and diarrhea. No fevers, no chills, no sick contacts, does not endorse eating anything out of the ordinary, does not recall his meal prior to the onset of symptoms. No cp, sob, no loc, no acute vision or hearing changes. No other ros symptoms reported.     ED course:   bp: 92 mm Hg/55 mm Hg  hr: 93 /min  rr: 18 /min  temp;97.7 Degrees F  oral  spo2: 96 %    wbc elevated 12k  lactate 4.4-->3.5-->2.2  cr 2.4 (unknown baseline)     CTAP w/IV contrast:     01. LIVER: Cirrhotic morphology. Hepatic steatosis.  02. SPLEEN: Normal unenhanced.  03. PANCREAS: Atrophic.  04. GALLBLADDER/BILIARY TREE: Gallbladder not definitively visualized.    No biliary duct dilatation.  05. ADRENALS: Normal.  06. KIDNEYS: Symmetric in size.  No hydronephrosis. Bilateral   nonobstructing renal stones measuring 1.5 x 0.7 cm and left renal pole   (series and 601 image 49) and 0.3 cm in the right renal lower pole   (series 3 image 142).  07. LYMPHADENOPATHY/RETROPERITONEUM: Prominent retroperitoneal lymph   nodes measuring upto 1 cm (series 4 image 43).  08. BOWEL: No bowel obstruction.  09. PELVIC VISCERA: Dense irregularity within the urinary bladder   measuring 5.3 x 4.6 cm (series 4 image 89). Prostate measures 3.7 cm in   transverse dimension (series 4 image 93).  10. PELVIC LYMPH NODES: No enlarged lymph nodes.  11. VASCULATURE: Diffuse calcified atherosclerotic disease of the aorta   and its branches. Multiple peripherally calcified vessels at the level of   the mid pancrea    impression:   No bowel obstruction.    Dense irregularity within the urinary bladder measuring up to 5.3 cm   concerning for an underlying lesion. No hydronephrosis.    Prominent retroperitoneal lymph nodes measuring up to 1 cm.    Cirrhotic morphology of the liver. Hepatic steatosis.    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     (17 Sep 2024 03:44)      PAST MEDICAL & SURGICAL HISTORY:  HTN (hypertension)      HLD (hyperlipidemia)      Kidney stone      No significant past surgical history          MEDICATIONS  (STANDING):  amLODIPine   Tablet 10 milliGRAM(s) Oral daily  heparin   Injectable 5000 Unit(s) SubCutaneous every 12 hours  lactobacillus acidophilus 1 Tablet(s) Oral daily  metoprolol succinate ER 25 milliGRAM(s) Oral daily    MEDICATIONS  (PRN):  acetaminophen     Tablet .. 650 milliGRAM(s) Oral every 6 hours PRN Severe Pain (7 - 10)  loperamide 2 milliGRAM(s) Oral two times a day PRN Diarrhea      Allergies    No Known Allergies    Intolerances          FAMILY HISTORY:  FH: CHF (congestive heart failure) (Father)        Physical Exam:   Vital Signs Last 24 Hrs  T(C): 36.8 (20 Sep 2024 12:29), Max: 36.8 (19 Sep 2024 19:42)  T(F): 98.3 (20 Sep 2024 12:29), Max: 98.3 (19 Sep 2024 19:42)  HR: 89 (20 Sep 2024 12:29) (88 - 94)  BP: 126/74 (20 Sep 2024 12:29) (114/73 - 126/74)  BP(mean): 91 (20 Sep 2024 12:29) (91 - 91)  RR: 17 (20 Sep 2024 12:29) (17 - 18)  SpO2: 96% (20 Sep 2024 12:29) (96% - 98%)    Parameters below as of 20 Sep 2024 12:29  Patient On (Oxygen Delivery Method): room air          Labs:                         12.7   13.90 )-----------( 291      ( 20 Sep 2024 12:21 )             35.7     09-19    142  |  105  |  29[H]  ----------------------------<  78  4.2   |  18  |  1.3    Ca    10.2      19 Sep 2024 04:53  Phos  2.6     09-19  Mg     2.0     09-19    TPro  5.6[L]  /  Alb  3.3[L]  /  TBili  3.1[H]  /  DBili  x   /  AST  259[H]  /  ALT  60[H]  /  AlkPhos  485[H]  09-19        Pertinent labs:                      12.7   13.90 )-----------( 291      ( 20 Sep 2024 12:21 )             35.7       09-19    142  |  105  |  29[H]  ----------------------------<  78  4.2   |  18  |  1.3    Ca    10.2      19 Sep 2024 04:53  Phos  2.6     09-19  Mg     2.0     09-19    TPro  5.6[L]  /  Alb  3.3[L]  /  TBili  3.1[H]  /  DBili  x   /  AST  259[H]  /  ALT  60[H]  /  AlkPhos  485[H]  09-19          Radiology & Additional Studies:     Radiology imaging reviewed.       ASSESSMENT AND PLAN:  79 y/o male w/pmhx of HTN, HLD p/w abdominal pain with imaging demonstrated bladder, liver, and bone masses concerning for metastatic disease. IR is consulted for liver mass biopsy.     -Recommend evaluation by heme/onc.   -Outpatient biopsy can be scheduled within 24-48hrs of discharge.     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  Patient ate out on 9/6 and began to experience abdominal pain and diarrhea. Denies any vomiting but endorses nausea, notes having 30-40 loose stools over the past week and over the past day he's had 3-5 loose stools. No melena, no hematochezia, describes stools as watery and filled with mucus. No prior history of this in the past, last colonoscopy was 8 years ago and was normal per patient. Notes 8/10 epigastric and periumbilical pain. Decreased appetite due to pain and diarrhea. No fevers, no chills, no sick contacts, does not endorse eating anything out of the ordinary, does not recall his meal prior to the onset of symptoms. No cp, sob, no loc, no acute vision or hearing changes. No other ros symptoms reported.     ED course:   bp: 92 mm Hg/55 mm Hg  hr: 93 /min  rr: 18 /min  temp;97.7 Degrees F  oral  spo2: 96 %    wbc elevated 12k  lactate 4.4-->3.5-->2.2  cr 2.4 (unknown baseline)     CTAP w/IV contrast:     01. LIVER: Cirrhotic morphology. Hepatic steatosis.  02. SPLEEN: Normal unenhanced.  03. PANCREAS: Atrophic.  04. GALLBLADDER/BILIARY TREE: Gallbladder not definitively visualized.    No biliary duct dilatation.  05. ADRENALS: Normal.  06. KIDNEYS: Symmetric in size.  No hydronephrosis. Bilateral   nonobstructing renal stones measuring 1.5 x 0.7 cm and left renal pole   (series and 601 image 49) and 0.3 cm in the right renal lower pole   (series 3 image 142).  07. LYMPHADENOPATHY/RETROPERITONEUM: Prominent retroperitoneal lymph   nodes measuring upto 1 cm (series 4 image 43).  08. BOWEL: No bowel obstruction.  09. PELVIC VISCERA: Dense irregularity within the urinary bladder   measuring 5.3 x 4.6 cm (series 4 image 89). Prostate measures 3.7 cm in   transverse dimension (series 4 image 93).  10. PELVIC LYMPH NODES: No enlarged lymph nodes.  11. VASCULATURE: Diffuse calcified atherosclerotic disease of the aorta   and its branches. Multiple peripherally calcified vessels at the level of   the mid pancrea    impression:   No bowel obstruction.    Dense irregularity within the urinary bladder measuring up to 5.3 cm   concerning for an underlying lesion. No hydronephrosis.    Prominent retroperitoneal lymph nodes measuring up to 1 cm.    Cirrhotic morphology of the liver. Hepatic steatosis.    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     (17 Sep 2024 03:44)      PAST MEDICAL & SURGICAL HISTORY:  HTN (hypertension)      HLD (hyperlipidemia)      Kidney stone      No significant past surgical history          MEDICATIONS  (STANDING):  amLODIPine   Tablet 10 milliGRAM(s) Oral daily  heparin   Injectable 5000 Unit(s) SubCutaneous every 12 hours  lactobacillus acidophilus 1 Tablet(s) Oral daily  metoprolol succinate ER 25 milliGRAM(s) Oral daily    MEDICATIONS  (PRN):  acetaminophen     Tablet .. 650 milliGRAM(s) Oral every 6 hours PRN Severe Pain (7 - 10)  loperamide 2 milliGRAM(s) Oral two times a day PRN Diarrhea      Allergies    No Known Allergies    Intolerances          FAMILY HISTORY:  FH: CHF (congestive heart failure) (Father)        Physical Exam:   Vital Signs Last 24 Hrs  T(C): 36.8 (20 Sep 2024 12:29), Max: 36.8 (19 Sep 2024 19:42)  T(F): 98.3 (20 Sep 2024 12:29), Max: 98.3 (19 Sep 2024 19:42)  HR: 89 (20 Sep 2024 12:29) (88 - 94)  BP: 126/74 (20 Sep 2024 12:29) (114/73 - 126/74)  BP(mean): 91 (20 Sep 2024 12:29) (91 - 91)  RR: 17 (20 Sep 2024 12:29) (17 - 18)  SpO2: 96% (20 Sep 2024 12:29) (96% - 98%)    Parameters below as of 20 Sep 2024 12:29  Patient On (Oxygen Delivery Method): room air          Labs:                         12.7   13.90 )-----------( 291      ( 20 Sep 2024 12:21 )             35.7     09-19    142  |  105  |  29[H]  ----------------------------<  78  4.2   |  18  |  1.3    Ca    10.2      19 Sep 2024 04:53  Phos  2.6     09-19  Mg     2.0     09-19    TPro  5.6[L]  /  Alb  3.3[L]  /  TBili  3.1[H]  /  DBili  x   /  AST  259[H]  /  ALT  60[H]  /  AlkPhos  485[H]  09-19        Pertinent labs:                      12.7   13.90 )-----------( 291      ( 20 Sep 2024 12:21 )             35.7       09-19    142  |  105  |  29[H]  ----------------------------<  78  4.2   |  18  |  1.3    Ca    10.2      19 Sep 2024 04:53  Phos  2.6     09-19  Mg     2.0     09-19    TPro  5.6[L]  /  Alb  3.3[L]  /  TBili  3.1[H]  /  DBili  x   /  AST  259[H]  /  ALT  60[H]  /  AlkPhos  485[H]  09-19          Radiology & Additional Studies:     Radiology imaging reviewed.       ASSESSMENT AND PLAN:  77 y/o male w/pmhx of HTN, HLD p/w abdominal pain with imaging demonstrated bladder, liver, and bone masses concerning for metastatic disease. IR is consulted for liver mass biopsy.     -Recommend evaluation by heme/onc.   -Outpatient biopsy can be scheduled within 24-48hrs of discharge pending heme/onc eval.   -Per med rec, on home asa. Aspirin will need to be held 5 days prior to procedure.    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  Patient ate out on 9/6 and began to experience abdominal pain and diarrhea. Denies any vomiting but endorses nausea, notes having 30-40 loose stools over the past week and over the past day he's had 3-5 loose stools. No melena, no hematochezia, describes stools as watery and filled with mucus. No prior history of this in the past, last colonoscopy was 8 years ago and was normal per patient. Notes 8/10 epigastric and periumbilical pain. Decreased appetite due to pain and diarrhea. No fevers, no chills, no sick contacts, does not endorse eating anything out of the ordinary, does not recall his meal prior to the onset of symptoms. No cp, sob, no loc, no acute vision or hearing changes. No other ros symptoms reported.     ED course:   bp: 92 mm Hg/55 mm Hg  hr: 93 /min  rr: 18 /min  temp;97.7 Degrees F  oral  spo2: 96 %    wbc elevated 12k  lactate 4.4-->3.5-->2.2  cr 2.4 (unknown baseline)     CTAP w/IV contrast:     01. LIVER: Cirrhotic morphology. Hepatic steatosis.  02. SPLEEN: Normal unenhanced.  03. PANCREAS: Atrophic.  04. GALLBLADDER/BILIARY TREE: Gallbladder not definitively visualized.    No biliary duct dilatation.  05. ADRENALS: Normal.  06. KIDNEYS: Symmetric in size.  No hydronephrosis. Bilateral   nonobstructing renal stones measuring 1.5 x 0.7 cm and left renal pole   (series and 601 image 49) and 0.3 cm in the right renal lower pole   (series 3 image 142).  07. LYMPHADENOPATHY/RETROPERITONEUM: Prominent retroperitoneal lymph   nodes measuring upto 1 cm (series 4 image 43).  08. BOWEL: No bowel obstruction.  09. PELVIC VISCERA: Dense irregularity within the urinary bladder   measuring 5.3 x 4.6 cm (series 4 image 89). Prostate measures 3.7 cm in   transverse dimension (series 4 image 93).  10. PELVIC LYMPH NODES: No enlarged lymph nodes.  11. VASCULATURE: Diffuse calcified atherosclerotic disease of the aorta   and its branches. Multiple peripherally calcified vessels at the level of   the mid pancrea    impression:   No bowel obstruction.    Dense irregularity within the urinary bladder measuring up to 5.3 cm   concerning for an underlying lesion. No hydronephrosis.    Prominent retroperitoneal lymph nodes measuring up to 1 cm.    Cirrhotic morphology of the liver. Hepatic steatosis.    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     (17 Sep 2024 03:44)      PAST MEDICAL & SURGICAL HISTORY:  HTN (hypertension)      HLD (hyperlipidemia)      Kidney stone      No significant past surgical history          MEDICATIONS  (STANDING):  amLODIPine   Tablet 10 milliGRAM(s) Oral daily  heparin   Injectable 5000 Unit(s) SubCutaneous every 12 hours  lactobacillus acidophilus 1 Tablet(s) Oral daily  metoprolol succinate ER 25 milliGRAM(s) Oral daily    MEDICATIONS  (PRN):  acetaminophen     Tablet .. 650 milliGRAM(s) Oral every 6 hours PRN Severe Pain (7 - 10)  loperamide 2 milliGRAM(s) Oral two times a day PRN Diarrhea      Allergies    No Known Allergies    Intolerances          FAMILY HISTORY:  FH: CHF (congestive heart failure) (Father)        Physical Exam:   Vital Signs Last 24 Hrs  T(C): 36.8 (20 Sep 2024 12:29), Max: 36.8 (19 Sep 2024 19:42)  T(F): 98.3 (20 Sep 2024 12:29), Max: 98.3 (19 Sep 2024 19:42)  HR: 89 (20 Sep 2024 12:29) (88 - 94)  BP: 126/74 (20 Sep 2024 12:29) (114/73 - 126/74)  BP(mean): 91 (20 Sep 2024 12:29) (91 - 91)  RR: 17 (20 Sep 2024 12:29) (17 - 18)  SpO2: 96% (20 Sep 2024 12:29) (96% - 98%)    Parameters below as of 20 Sep 2024 12:29  Patient On (Oxygen Delivery Method): room air          Labs:                         12.7   13.90 )-----------( 291      ( 20 Sep 2024 12:21 )             35.7     09-19    142  |  105  |  29[H]  ----------------------------<  78  4.2   |  18  |  1.3    Ca    10.2      19 Sep 2024 04:53  Phos  2.6     09-19  Mg     2.0     09-19    TPro  5.6[L]  /  Alb  3.3[L]  /  TBili  3.1[H]  /  DBili  x   /  AST  259[H]  /  ALT  60[H]  /  AlkPhos  485[H]  09-19        Pertinent labs:                      12.7   13.90 )-----------( 291      ( 20 Sep 2024 12:21 )             35.7       09-19    142  |  105  |  29[H]  ----------------------------<  78  4.2   |  18  |  1.3    Ca    10.2      19 Sep 2024 04:53  Phos  2.6     09-19  Mg     2.0     09-19    TPro  5.6[L]  /  Alb  3.3[L]  /  TBili  3.1[H]  /  DBili  x   /  AST  259[H]  /  ALT  60[H]  /  AlkPhos  485[H]  09-19          Radiology & Additional Studies:     Radiology imaging reviewed.       ASSESSMENT AND PLAN:  79 y/o male w/pmhx of HTN, HLD p/w abdominal pain with imaging demonstrated bladder, liver, and bone masses concerning for metastatic disease. IR is consulted for liver mass biopsy.     -spoke with Heme/Onc and patient may get inpatient chemo so will do inpatient biopsy    Please call Interventional Radiology with questions or concerns:   - M-F [id number]: [phone]   - All other hours: [phone]

## 2024-09-20 NOTE — PROGRESS NOTE ADULT - SUBJECTIVE AND OBJECTIVE BOX
INTERVAL HPI/OVERNIGHT EVENTS:  Patient was seen and examined at bedside. As per nurse and patient, no o/n events, patient resting comfortably. No complaints at this time. Patient denies: fever, chills, dizziness, weakness, HA, Changes in vision, CP, palpitations, SOB, cough, N/V/D/C, dysuria, changes in bowel movements, LE edema. ROS otherwise negative.  Diarrhea is getting better. More formed than before. Patient states his appetite is improving.    VITAL SIGNS:  T(F): 98 (09-20-24 @ 04:23)  HR: 94 (09-20-24 @ 04:23)  BP: 114/73 (09-20-24 @ 04:23)  RR: 17 (09-20-24 @ 04:23)  SpO2: 98% (09-20-24 @ 04:23)  Wt(kg): --    PHYSICAL EXAM:    Constitutional: WDWN, NAD  HEENT: PERRL, EOMI, sclera non-icteric, neck supple, trachea midline, no masses, no JVD, MMM, good dentition  Respiratory: CTA b/l, good air entry b/l, no wheezing, no rhonchi, no rales, without accessory muscle use and no intercostal retractions  Cardiovascular: RRR, normal S1S2, no M/R/G  Gastrointestinal: soft, NTND, no masses palpable, BS normal  Extremities: Warm, well perfused, pulses equal bilateral upper and lower extremities, no edema, no clubbing  Neurological: AAOx3, CN Grossly intact  Skin: Normal temperature, warm, dry    MEDICATIONS  (STANDING):  amLODIPine   Tablet 10 milliGRAM(s) Oral daily  heparin   Injectable 5000 Unit(s) SubCutaneous every 12 hours  lactated ringers. 1000 milliLiter(s) (150 mL/Hr) IV Continuous <Continuous>  lactobacillus acidophilus 1 Tablet(s) Oral daily  metoprolol succinate ER 25 milliGRAM(s) Oral daily    MEDICATIONS  (PRN):  acetaminophen     Tablet .. 650 milliGRAM(s) Oral every 6 hours PRN Severe Pain (7 - 10)      Allergies    No Known Allergies    Intolerances        LABS:                        12.3   12.29 )-----------( 271      ( 19 Sep 2024 04:53 )             34.3     09-19    142  |  105  |  29[H]  ----------------------------<  78  4.2   |  18  |  1.3    Ca    10.2      19 Sep 2024 04:53  Phos  2.6     09-19  Mg     2.0     09-19    TPro  5.6[L]  /  Alb  3.3[L]  /  TBili  3.1[H]  /  DBili  x   /  AST  259[H]  /  ALT  60[H]  /  AlkPhos  485[H]  09-19      Urinalysis Basic - ( 19 Sep 2024 04:53 )    Color: x / Appearance: x / SG: x / pH: x  Gluc: 78 mg/dL / Ketone: x  / Bili: x / Urobili: x   Blood: x / Protein: x / Nitrite: x   Leuk Esterase: x / RBC: x / WBC x   Sq Epi: x / Non Sq Epi: x / Bacteria: x        RADIOLOGY & ADDITIONAL TESTS:  Reviewed

## 2024-09-21 NOTE — DISCHARGE NOTE PROVIDER - CARE PROVIDER_API CALL
Claire Casillas  Internal Medicine  [address] [place] [address]  Phone: [phone]  Fax: [phone]  Established Patient  Follow Up Time: 2 weeks    Haider Mckeon  Urology  [address] [place] [address]  Phone: [phone]  Fax: [phone]  Established Patient  Follow Up Time: 1 week    Dawit Rothman  Hematology  [address] [place] [address]  Phone: [phone]  Fax: [phone]  Follow Up Time: 1 week   Claire Casillas  Internal Medicine  [address] [place] [address]  Phone: [phone]  Fax: [phone]  Established Patient  Follow Up Time: 2 weeks    Haider Mckeon  Urology  [address] [place] [address]  Phone: [phone]  Fax: [phone]  Established Patient  Follow Up Time: 1 week    Dawit Rothman  Hematology  [address] [address]  Phone: [phone]  Fax: [phone]  Follow Up Time: 1 week    Delma Pierre  Internal Medicine  [address] [place] [address]  Phone: [phone]  Fax: [phone]  Established Patient  Follow Up Time: 1 week

## 2024-09-21 NOTE — CONSULT NOTE ADULT - ATTENDING COMMENTS
Josué was seen earlier today on rounds.  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     I had a discussion with the patient I explained that we would need a biopsy of the liver metastases to confirm diagnosis before offering any treatment.    I explained that if this is indeed metastatic urothelial carcinoma I could offer him gemcitabine and carboplatin another option would be Keytruda and Padcev, but it is unlikely I will be able to obtain Padcev in the hospital.     Regardless of the treatment chosen I explained that I am not sure if they will be enough time for the treatment to work to change the trajectory of his liver enzymes which unfortunately would lead to liver failure.    I explained to giving chemotherapy in the setting of worsening liver functions can also be much more toxic and lead to serious complication including death.    Another option would be just for supportive measures and hospice with the expected prognosis of probably a few weeks at most.    He wanted to think about his options and we will have a meeting with him and his son tomorrow.    Prognosis is poor even if decides on chemotherapy.    If he decides on treatment I would keep him in the hospital for biopsy and chemotherapy.
78 year old  M with HTN, HLD, recurrent nephrolithiasis,  prior alcohol use admitted with acute gastroenteritis seen for elevation liver tests and cirrhosis on imaging. Reports eating meal with shrimp at onset.  Still has diarrhea.  Alert oriented   ? icterus  Abdomen soft tender hepatomegaly    Acute gastroenteritis with KANWAL probably bacterial   Neutrophilia. On abx.   Acute hepatitis - ALP 4 x ULN AST 8 x ULN AST > ALT T bili 2.6 Could be due to gastroenteritis. Campylobacter and adenovirus can cause elevated liver tests.   Steatotic liver disease with cirrhosis hx of alcohol use - compensated    Work up for etiology of cirrhosis   In view of cirrhosis elevated liver tests and bilirubin will check tumor markers and if no improvement will need to consider contrast imaging.

## 2024-09-21 NOTE — PROGRESS NOTE ADULT - ASSESSMENT
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  Patient ate out on 9/6 and began to experience abdominal pain and diarrhea. Denies any vomiting but endorses nausea, notes having 30-40 loose stools over the past week and over the past day he's had 3-5 loose stools. No melena, no hematochezia, describes stools as watery and filled with mucus. No prior history of this in the past, last colonoscopy was 8 years ago and was normal per patient. Notes 8/10 epigastric and periumbilical pain. Decreased appetite due to pain and diarrhea. No fevers, no chills, no sick contacts, does not endorse eating anything out of the ordinary, does not recall his meal prior to the onset of symptoms. No cp, sob, no loc, no acute vision or hearing changes. No other ros symptoms reported.   #Diarrhea  #Abd pain   #transaminitis  #hepatic steatosis   #elevated alk phos  -pt has history of fentanyl patch use - could be accounting for diarrhea with w/d diarrhea  -pt states two weeks when abdominal pain and diarrhea started, he had shrimp/seafood from [employer]  -on fentayl patch due to cervical neck pain that is chronic - does not want at thistime  - CT a/p showing cirrhotic morphology - which is new for patient  - RUQ US: cirrhotic liver  - t bili 2.6 --> 2.1 --> 3.1  -  ALT 70  - lipase 81  - hepatitis panel negative  - f/u stool studies   - GI PCR panel negative, C. Diff negative  - AFB ngtd, bcx ngtd, ua negative   - stool ova and parasites pending  - hold abx for now  - IVF 150cc/hr  - f/u 5HIAA, gastrin level (off PPI), VIP level  - stool electrolytes to calculate stool osm gap  - Hepatology consult noted  - CA 19-9 +ve, AFP -ve, HIV -ve  - f/u CLD workup  - Echo ordered    #kanwal, HAGMA  -suspect HAGMA in setting of KANWAL  cr 2.4 (unknown baseline)   - kanwal likely pre-renal from dehydration     #bladder mass  - Dense irregularity within the urinary bladder measuring up to 5.3 cm   concerning for an underlying lesion. No hydronephrosis.  - urology evaluated - o/p f/u  - MR abdomen --> masses in liver suspicious for mets  - CS heme/onc    #HTN  - restart amlodipine  - resart metoprolol  - with hold paramaters  - hold ACE in setting of KANWAL    #HLD  - hold statin due to elevated LFTs  - calculate ASCVD to see if pt requires statin at this time vs lifestyle changes, may need alternative antilipid med    #Nephrolithiasis   - notes passing kidney stones in past and had one recently   - consider US for further eval     #Bladder mass on CTAP  f/u Urology  - ua, ucx, urine cytology    DVT proph: heparin sub-q   GI ppx - no ppi  discussed GOC: full code

## 2024-09-21 NOTE — DISCHARGE NOTE PROVIDER - NSDCFUADDAPPT_GEN_ALL_CORE_FT
Do you need a primary care doctor or follow-up with a specialist? Our care coordinators will help you find providers near you and schedule any follow-up care visits.    Monday-Friday: 9am-5pm    Call our [facility] team: [phone]

## 2024-09-21 NOTE — CONSULT NOTE ADULT - ASSESSMENT
The patient is a 77 y/o male w/pmhx of HTN, HLD, hx of frequent nephrothiliasis and recent passage of a stone a few days ago w/trace hematuria, cervical neck arthritis previous use of opioid pain medication, presenting for 10 days of diarrhea, abdominal pain.Notes having 30-40 loose stools over the past week and over the past day he's had 3-5 loose stools.       #Bladder mass   #Innumerable bilobar hepatic masses,   #mildly enlarged upper abdominal lymph nodes  #foci of osseous metastasis  -Presented with c/o diarrhea   -CT A/p IMPRESSION: Dense irregularity within the urinary bladder measuring up to 5.3 cm   concerning for an underlying lesion. No hydronephrosis. Prominent retroperitoneal lymph nodes measuring up to 1 cm.Cirrhotic morphology of the liver. 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   49 , AFP 2.4,   Evaluated by urology, Pt. will need to F/U with Dr. Caldwell for further  management of bladder mass.  MR 9.24: Enlarged heterogeneous liver with innumerable bilobar hepatic masses,   suspicious for metastatic disease. Multiple subcentimeter and mildly enlarged upper abdominal lymph nodes, as detailed above, suspicious for lymph node involvement. Numerous foci of osseous metastasis, within the sacrum, bilateral iliac bones and several vertebral bodies. Partially imaged 3.9 cm enhancing lobulated soft tissue mass within   the urinary bladder, suspicious for neoplasm.      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 He has also developed  liver failure with worsening transaminase, Bilirubin. These patients have poor prognosis.   Discussed with him that if he wishes to proceed with further evaluation, he will need inpatient biopsy followed by inpatient treatment. If not, he would be a candidate for hospice.  He wishes to discuss with son. WIll have a family meeting with Mr Moses and his son tomorrow at 12pm to discuss this further.

## 2024-09-21 NOTE — PROGRESS NOTE ADULT - SUBJECTIVE AND OBJECTIVE BOX
Hepatology Follow Up Note      Location: [address]21 [address] ([facility] 3B)  Patient Name: NATHANAEL MCCLENDON  Age: 78y  Gender: Male      Chief Complaint  Patient is a 78y old Male who presents with a chief complaint of diarrhea (21 Sep 2024 13:19)  Primary diagnosis of Abdominal pain      Reason for Consult  Cirrhosis  Diarrhea      Progress Note  This morning patient was seen and examined at bedside.    Today is hospital day 5d.  Patient is doing fine. No acute events overnight.   Patient's appetite is adequate, and he is tolerating diet.  He notes mild nausea and abdominal cramps but denies vomiting.  He notes some improvement in watery non-bloody non-mucoid diarrhea: 12-14 episodes on 09/18 -> 10-12 episodes on 09/19 -> 6 on 09/20 -> and 3 so far on 09/21.      Vital Signs in the last 24 hours   Vitals Summary T(C): 37.1 (09-21-24 @ 13:58), Max: 37.1 (09-21-24 @ 13:58)  HR: 86 (09-21-24 @ 13:58) (85 - 98)  BP: 122/65 (09-21-24 @ 13:58) (112/63 - 122/65)  RR: 18 (09-21-24 @ 13:58) (18 - 18)  SpO2: 98% (09-21-24 @ 05:05) (97% - 98%)  Vent Data   Intake/ Output   Measurements       Physical Exam  * General Appearance: Alert, cooperative, interactive, oriented to time, place, and person, in no acute distress  * Eyes: PERRL, conjunctiva/corneas clear, EOM's intact, fundi benign, both eyes  * Lungs: Good bilateral air entry, normal breath sounds   * Heart: Regular Rate and Rhythm, normal S1 and S2, no audible murmur, rub, or gallop  * Abdomen: Symmetric, non-distended, soft, non-tender, bowel sounds active all four quadrants, no masses, no organomegaly (no hepatosplenomegaly)  * Extremities: no lower extremity pitting edema bilaterally; no asterexis        Investigations   Laboratory Workup      - CBC:                        11.9   13.63 )-----------( 282      ( 21 Sep 2024 07:56 )             33.3       - Hgb Trend:  11.9  09-21-24 @ 07:56  12.7  09-20-24 @ 12:21  12.3  09-19-24 @ 04:53          - Chemistry:  09-21    141  |  104  |  27[H]  ----------------------------<  76  4.1   |  19  |  1.2    Ca    9.6      21 Sep 2024 07:56  Phos  2.6     09-21  Mg     1.8     09-21    TPro  5.0[L]  /  Alb  3.0[L]  /  TBili  3.8[H]  /  DBili  x   /  AST  294[H]  /  ALT  63[H]  /  AlkPhos  655[H]  09-21    Liver panel trend:  TBili 3.8   /      /   ALT 63   /   AlkP 655   /   Tptn 5.0   /   Alb 3.0    /   DBili --      09-21  TBili 3.8   /      /   ALT 65   /   AlkP 542   /   Tptn 5.3   /   Alb 3.4    /   DBili --      09-20  TBili 3.1   /      /   ALT 60   /   AlkP 485   /   Tptn 5.6   /   Alb 3.3    /   DBili --      09-19  TBili 2.1   /      /   ALT 70   /   AlkP 485   /   Tptn 5.6   /   Alb 3.7    /   DBili --      09-17  TBili 2.6   /      /   ALT 84   /   AlkP 596   /   Tptn 6.4   /   Alb 3.8    /   DBili --      09-16            Microbiological Workup  Urinalysis Basic - ( 21 Sep 2024 07:56 )    Color: x / Appearance: x / SG: x / pH: x  Gluc: 76 mg/dL / Ketone: x  / Bili: x / Urobili: x   Blood: x / Protein: x / Nitrite: x   Leuk Esterase: x / RBC: x / WBC x   Sq Epi: x / Non Sq Epi: x / Bacteria: x        Current Medications  Standing Medications  heparin   Injectable 5000 Unit(s) SubCutaneous every 12 hours  lactobacillus acidophilus 1 Tablet(s) Oral daily  loperamide 2 milliGRAM(s) Oral every 6 hours  metoprolol succinate ER 25 milliGRAM(s) Oral daily    PRN Medications  acetaminophen     Tablet .. 650 milliGRAM(s) Oral every 6 hours PRN Severe Pain (7 - 10)    Singles Doses Administered  (ADM OVERRIDE) 1 each &lt;see task&gt; GiveOnce  (ADM OVERRIDE) 1 each &lt;see task&gt; GiveOnce  (ADM OVERRIDE) 1 each &lt;see task&gt; GiveOnce  (ADM OVERRIDE) 1 each &lt;see task&gt; GiveOnce  lactated ringers Bolus 1000 milliLiter(s) IV Bolus once  metroNIDAZOLE  IVPB 500 milliGRAM(s) IV Intermittent once  morphine  - Injectable 4 milliGRAM(s) IV Push Once  morphine  - Injectable 4 milliGRAM(s) IV Push once  ondansetron Injectable 4 milliGRAM(s) IV Push once  piperacillin/tazobactam IVPB... 3.375 Gram(s) IV Intermittent once  sodium chloride 0.9% Bolus 500 milliLiter(s) IV Bolus once  sodium zirconium cyclosilicate 5 Gram(s) Oral once

## 2024-09-21 NOTE — DISCHARGE NOTE PROVIDER - PROVIDER TOKENS
PROVIDER:[TOKEN:[[id number]],FOLLOWUP:[2 weeks],ESTABLISHEDPATIENT:[T]],PROVIDER:[TOKEN:[[id number]],FOLLOWUP:[1 week],ESTABLISHEDPATIENT:[T]],PROVIDER:[TOKEN:[[id number]],FOLLOWUP:[1 week]] PROVIDER:[TOKEN:[[id number]],FOLLOWUP:[2 weeks],ESTABLISHEDPATIENT:[T]],PROVIDER:[TOKEN:[[id number]],FOLLOWUP:[1 week],ESTABLISHEDPATIENT:[T]],PROVIDER:[TOKEN:[[id number]],FOLLOWUP:[1 week]],PROVIDER:[TOKEN:[[id number]],FOLLOWUP:[1 week],ESTABLISHEDPATIENT:[T]]

## 2024-09-21 NOTE — DISCHARGE NOTE PROVIDER - NSDCMRMEDTOKEN_GEN_ALL_CORE_FT
Ambien 10 mg oral tablet: 0.5 tablet orally at bedtime as needed  aspirin: 81 milligram(s) orally once a day  benazepril 10 mg oral tablet: 1 tab(s) orally once a day  fentaNYL 25 mcg/hr transdermal film, extended release: 1 patch transdermally every 3 days as needed for  moderate pain  Lipitor 20 mg oral tablet: 1 tab(s) orally once a day  metoprolol succinate 25 mg oral tablet, extended release: 1 tab(s) orally once a day  multivitamin: One tablet orally once daily  Norvasc 10 mg oral tablet: 1 tab(s) orally once a day   Ambien 10 mg oral tablet: 0.5 tablet orally at bedtime as needed  aspirin: 81 milligram(s) orally once a day  benazepril 10 mg oral tablet: 1 tab(s) orally once a day  fentaNYL 25 mcg/hr transdermal film, extended release: 1 patch transdermally every 3 days as needed for  moderate pain  metoprolol succinate 25 mg oral tablet, extended release: 1 tab(s) orally once a day  multivitamin: One tablet orally once daily  Norvasc 10 mg oral tablet: 1 tab(s) orally once a day

## 2024-09-21 NOTE — PROGRESS NOTE ADULT - ASSESSMENT
Assessment and Plan  Case of a 78 year old male patient with history of HTN, DL, nephrolithiasis, and pre DM, was found to have elevated LFTs on blood work and evidence of cirrhosis on imaging. We are consulted for concern of cirrhosis.      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 Suspected Primary Bladder Cancer; History of Alcohol Use (MELD 3- 17; Child A 6)  Rule Out DILI (Statin)  Ruled Out Viral Infection (Adenovirus) VS Bacterial Infection (Campylobacter) in Setting of Diarrhea  No Evidence of Portal HTN on Imaging  No Anemia or Thrombocytopenia; No SPM  No Ascites  Unknown History of Esophageal Varices (no prior EGD)  No Hepatic Encephalopathy   * History of ethanol use: used to drink [employer] 1-2 shots daily from 5722-4295 then switched to occasional beer drinking (drank 3 beers in last years; last beer was on MARIANN); notes snorting cocaine in past; marijuana use in past; denies multiple sexual partners (wife passed away)  * No CLD workup   * No previous abdominal imaging  * LFTs 2.6/596/304/84 on 09/16 -> 2.1/485/260/70 on 09/17 -> 3.1/485/259/60 on 09/19 (previous LFTs 05/30/2024 0.4/92/30/22)  * INR 1.06 on 09/17; LA 4.4 on 09/16 -> 1.9 on 09/17  * RUQ US 09/18 cirrhosis; normal CBD; no ascites  * CT Abdomen and Pelvis No Cont (09.16.24 @ 14:51) Dense irregularity within the urinary bladder measuring up to 5.3 cm concerning for an underlying lesion. No hydronephrosis. Prominent retroperitoneal lymph nodes measuring up to 1 cm. Cirrhotic morphology of the liver. Hepatic steatosis.  * MR abdomen 09/19 concerning for bladder mass with liver and bone mets  * No previous EGD; last colonoscopy many years ago per patient (unremarkable per patient)  * No FHx of liver diseases or GI cancers    RECOMMENDATIONS  - MR abdomen with without contrast (liver protocol) noted. Recommend urology follow up and oncology/IR evaluation for tissue sampling to confirm diagnosis of bladder cancer metastatic to liver/bone. Tumor markers: Ca 19-9 was 49 and AFP was 2.4 on 09/19; HIV - on 09/19  - Trend hepatic function panel and INR daily for MELD Score calculation  - Avoid hepatotoxic agents: agree with holding Atorvastatin 20mg for now  - TTE from 09/20 noted: EG 55-60%, DD I, mild MR  - Follow up CLD workup: TOVA and microsomal Ab (received). Remainder of CLD workup: Ig panel, AMA, and ASMA unremarkable  - For diarrhea since 09/07: GI PCR - on 09/17; C difficile - on 09/19; stool culture -; follow up elastase, fat (received on 09/17); check stool ova parasites. Received Zosyn/Rocephin and flagyl until 09/18. Currently monitoring off Abs  - For Esophageal varices screening: recommend outpatient EGD. Off B Blockers  - No Ascites on current exam/imaging. Recommend serial exams and tap when possible. Off diuretics  - Monitor for Hepatic encephalopathy: avoid constipation; avoid sedatives and limit opioids   - For HCC screening: Please f/u as outpatient for US abdomen and AFP every 6 months.  - For Vaccinations: Please f/u in clinic for being uptodate with HAV/HBV/Influenza/Pneumococcal vaccines.  - Lifestyle/Dietary modifications: Calorie intake 25-40 Kcal/kg/day; Protein intake: 1.2-1.5 gm/kg/day  - Avoid smoking and NSAIDS  - Abstain from alcohol and all illicit drugs  - Avoid use of herbal and dietary supplements due to potential hepatotoxicity. Limit use of acetaminophen to <2 grams per day. Avoid use of nonsteroidal antiinflammatory drugs (NSAIDs) . Avoid eating any unpasteurized dairy products; avoid eating any raw or undercooked eggs, fish, poultry, or meat; and avoid eating raw/steamed oysters or other shellfish to avoid risk of Vibrio infection.    - Follow up with our [facility] located at [address] [place] [address], [id number]. Telephone No: [phone]      Thank you for your consult.  - Please note that plan was communicated with medical team.   - Please reach GI on [phone] during weekdays till 5pm.  - Please call the GI service line after 5pm on Weekdays and anytime on Weekends: [phone].      Roseanna Tomas MD  PGY - 5 Gastroenterology Fellow   [facility]

## 2024-09-21 NOTE — PROGRESS NOTE ADULT - ATTENDING COMMENTS
77 y/o male w/pmhx of HTN, HLD, hx of frequent nephrolithiasis with over 10 episodes in 2010, and recent passage of a stone a few days ago w/trace hematuria, cervical neck osteoarthritis previous use of opioid pain medication, presenting for 10 days of diarrhea.         #Diarrhea  - CT abd negative for enterocolitis   - GI PCR panel negative, C. 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 Multiple subcentimeter and mildly enlarged upper abdominal lymph nodes, as detailed above, suspicious for lymph node involvement. Numerous foci of osseous metastasis, as detailed above. Partially imaged 3.9 cm enhancing lobulated soft tissue mass within the urinary bladder, suspicious for neoplasm.  - Urology and IR consulted - outpatient biopsy   - Oncology planning for family meeting tomorrow - if patient pursues treatment then will need IR guided biopsy inpatient     #KANWAL   - resolving     #HTN  - c/w home meds     #HLD  - hold statin due to elevated LFTs      DVT proph: heparin sub-q      Pending:  family meeting tomorrow   Plan of care d/w pt and oncology   Dispo: Home

## 2024-09-21 NOTE — DISCHARGE NOTE PROVIDER - NSDCCPCAREPLAN_GEN_ALL_CORE_FT
PRINCIPAL DISCHARGE DIAGNOSIS  Diagnosis: Abdominal pain  Assessment and Plan of Treatment: You came to the hospital for abdominal pain and diarrhea. You most likely had gastroenteritis after seafood intake. Stool tests for an infection were negative. You received IV hydration and your diet was advanced with improvement of the diarrhea. You also received imodium which made the diarrhea better too. You were noted to have a kidney insult which IV fluids too. You were noted ot have a urinary bladder mass with MR abdomen showing suspected extension of this mass to it in the form of metastases and in the vertebrae and hip bones too. Interventional radiology were consulted, you will need to be seen an oncologist to discuss the next steps in diagnosis and management.  Please note you will have to do the following as soon as you can: follow up in the clinic with your PCP Dr. Casillas, urologist Dr. Mckeon and an oncologist Dr. Rothman to assess the next steps in management regarding the bladder mass.  Please note that you will have to stop taking aspirin 5 days before having a biopsy done if it were indicated.      SECONDARY DISCHARGE DIAGNOSES  Diagnosis: Diarrhea  Assessment and Plan of Treatment:     Diagnosis: KANWAL (acute kidney injury)  Assessment and Plan of Treatment:     Diagnosis: Acute UTI  Assessment and Plan of Treatment:      PRINCIPAL DISCHARGE DIAGNOSIS  Diagnosis: Abdominal pain  Assessment and Plan of Treatment: You came to the hospital for abdominal pain and diarrhea. You most likely had gastroenteritis after seafood intake. Stool tests for an infection were negative. You received IV hydration and your diet was advanced with improvement of the diarrhea. You also received imodium which made the diarrhea better too. You were noted to have a kidney insult which IV fluids too. You were noted ot have a urinary bladder mass with MR abdomen showing suspected extension of this mass to it in the form of metastases and in the vertebrae and hip bones too. Interventional radiology were consulted, you will need to be seen an oncologist to discuss the next steps in diagnosis and management.  Note that your cholesterol medication was stopped as your liver enzymes were high.  Please note you will have to do the following as soon as you can: follow up in the clinic with your PCP Dr. Casillas, urologist Dr. Mckeon, oncologist Dr. Rothman, and hepatologist Dr. Pierre to assess the next steps in management regarding the bladder mass.  Please note that you will have to stop taking aspirin 5 days before having a biopsy done if it were indicated.      SECONDARY DISCHARGE DIAGNOSES  Diagnosis: Diarrhea  Assessment and Plan of Treatment:     Diagnosis: KANWAL (acute kidney injury)  Assessment and Plan of Treatment:     Diagnosis: Acute UTI  Assessment and Plan of Treatment:

## 2024-09-21 NOTE — PROGRESS NOTE ADULT - SUBJECTIVE AND OBJECTIVE BOX
INTERVAL HPI/OVERNIGHT EVENTS:  Patient was seen and examined at bedside. As per nurse and patient, no o/n events, patient resting comfortably. No complaints at this time. Patient denies: fever, chills, dizziness, weakness, HA, Changes in vision, CP, palpitations, SOB, cough, N/V/D/C, dysuria, changes in bowel movements, LE edema. ROS otherwise negative.    VITAL SIGNS:  T(F): 97.6 (09-21-24 @ 05:05)  HR: 85 (09-21-24 @ 05:05)  BP: 114/52 (09-21-24 @ 05:05)  RR: 18 (09-21-24 @ 05:05)  SpO2: 98% (09-21-24 @ 05:05)  Wt(kg): --    PHYSICAL EXAM:    Constitutional: WDWN, NAD  Respiratory: CTA b/l, good air entry b/l, no wheezing, no rhonchi, no rales, without accessory muscle use and no intercostal retractions  Cardiovascular: RRR, normal S1S2, no M/R/G  Gastrointestinal: soft, NTND, no masses palpable, BS normal  Extremities: Warm, well perfused, pulses equal bilateral upper and lower extremities, no edema, no clubbing    MEDICATIONS  (STANDING):  amLODIPine   Tablet 10 milliGRAM(s) Oral daily  heparin   Injectable 5000 Unit(s) SubCutaneous every 12 hours  lactobacillus acidophilus 1 Tablet(s) Oral daily  loperamide 2 milliGRAM(s) Oral every 6 hours  metoprolol succinate ER 25 milliGRAM(s) Oral daily    MEDICATIONS  (PRN):  acetaminophen     Tablet .. 650 milliGRAM(s) Oral every 6 hours PRN Severe Pain (7 - 10)      Allergies    No Known Allergies    Intolerances        LABS:                        11.9   13.63 )-----------( 282      ( 21 Sep 2024 07:56 )             33.3     09-20    141  |  104  |  26[H]  ----------------------------<  102[H]  4.0   |  19  |  1.2    Ca    9.8      20 Sep 2024 12:21  Mg     1.9     09-20    TPro  5.3[L]  /  Alb  3.4[L]  /  TBili  3.8[H]  /  DBili  x   /  AST  278[H]  /  ALT  65[H]  /  AlkPhos  542[H]  09-20      Urinalysis Basic - ( 20 Sep 2024 12:21 )    Color: x / Appearance: x / SG: x / pH: x  Gluc: 102 mg/dL / Ketone: x  / Bili: x / Urobili: x   Blood: x / Protein: x / Nitrite: x   Leuk Esterase: x / RBC: x / WBC x   Sq Epi: x / Non Sq Epi: x / Bacteria: x        RADIOLOGY & ADDITIONAL TESTS:  Reviewed

## 2024-09-21 NOTE — DISCHARGE NOTE PROVIDER - CARE PROVIDERS DIRECT ADDRESSES
,DirectAddress_Unknown,[email or web address] ,[email or web address],[email or web address]

## 2024-09-21 NOTE — CONSULT NOTE ADULT - SUBJECTIVE AND OBJECTIVE BOX
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  Patient ate out on 9/6 and began to experience abdominal pain and diarrhea. Denies any vomiting but endorses nausea, notes having 30-40 loose stools over the past week and over the past day he's had 3-5 loose stools. No melena, no hematochezia, describes stools as watery and filled with mucus. No prior history of this in the past, last colonoscopy was 8 years ago and was normal per patient. Notes 8/10 epigastric and periumbilical pain. Decreased appetite due to pain and diarrhea. No fevers, no chills, no sick contacts, does not endorse eating anything out of the ordinary, does not recall his meal prior to the onset of symptoms. No cp, sob, no loc, no acute vision or hearing changes. No other ros symptoms reported.     ED course:   bp: 92 mm Hg/55 mm Hg  hr: 93 /min  rr: 18 /min  temp;97.7 Degrees F  oral  spo2: 96 %    wbc elevated 12k  lactate 4.4-->3.5-->2.2  cr 2.4 (unknown baseline)     CTAP w/IV contrast:     01. LIVER: Cirrhotic morphology. Hepatic steatosis.  02. SPLEEN: Normal unenhanced.  03. PANCREAS: Atrophic.  04. GALLBLADDER/BILIARY TREE: Gallbladder not definitively visualized.    No biliary duct dilatation.  05. ADRENALS: Normal.  06. KIDNEYS: Symmetric in size.  No hydronephrosis. Bilateral   nonobstructing renal stones measuring 1.5 x 0.7 cm and left renal pole   (series and 601 image 49) and 0.3 cm in the right renal lower pole   (series 3 image 142).  07. LYMPHADENOPATHY/RETROPERITONEUM: Prominent retroperitoneal lymph   nodes measuring upto 1 cm (series 4 image 43).  08. BOWEL: No bowel obstruction.  09. PELVIC VISCERA: Dense irregularity within the urinary bladder   measuring 5.3 x 4.6 cm (series 4 image 89). Prostate measures 3.7 cm in   transverse dimension (series 4 image 93).  10. PELVIC LYMPH NODES: No enlarged lymph nodes.  11. VASCULATURE: Diffuse calcified atherosclerotic disease of the aorta   and its branches. Multiple peripherally calcified vessels at the level of   the mid pancrea    impression:   No bowel obstruction.    Dense irregularity within the urinary bladder measuring up to 5.3 cm   concerning for an underlying lesion. No hydronephrosis.    Prominent retroperitoneal lymph nodes measuring up to 1 cm.    Cirrhotic morphology of the liver. Hepatic steatosis.    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     (17 Sep 2024 03:44)      Allergies    No Known Allergies    Intolerances        MEDICATIONS  (STANDING):  amLODIPine   Tablet 10 milliGRAM(s) Oral daily  heparin   Injectable 5000 Unit(s) SubCutaneous every 12 hours  lactobacillus acidophilus 1 Tablet(s) Oral daily  loperamide 2 milliGRAM(s) Oral every 6 hours  metoprolol succinate ER 25 milliGRAM(s) Oral daily    MEDICATIONS  (PRN):  acetaminophen     Tablet .. 650 milliGRAM(s) Oral every 6 hours PRN Severe Pain (7 - 10)      PAST MEDICAL & SURGICAL HISTORY:  HTN (hypertension)      HLD (hyperlipidemia)      Kidney stone      No significant past surgical history          FAMILY HISTORY:  FH: CHF (congestive heart failure) (Father)        SOCIAL HISTORY: No EtOH, no tobacco    REVIEW OF SYSTEMS:  weakness  weight loss  diarrhea         T(F): 97.6 (09-21-24 @ 05:05), Max: 97.9 (09-20-24 @ 20:28)  HR: 85 (09-21-24 @ 05:05)  BP: 114/52 (09-21-24 @ 05:05)  RR: 18 (09-21-24 @ 05:05)  SpO2: 98% (09-21-24 @ 05:05)  Wt(kg): --                            11.9   13.63 )-----------( 282      ( 21 Sep 2024 07:56 )             33.3       09-21    141  |  104  |  27[H]  ----------------------------<  76  4.1   |  19  |  1.2    Ca    9.6      21 Sep 2024 07:56  Phos  2.6     09-21  Mg     1.8     09-21    TPro  5.0[L]  /  Alb  3.0[L]  /  TBili  3.8[H]  /  DBili  x   /  AST  294[H]  /  ALT  63[H]  /  AlkPhos  655[H]  09-21      Magnesium: 1.8 mg/dL (09-21 @ 07:56)  Phosphorus: 2.6 mg/dL (09-21 @ 07:56)    Physical exam:   Constitutional: WDWN, NAD, thin   Heent: sclera icteric  Skin: icteric   Respiratory: CTA b/l, good air entry b/l, no wheezing, no rhonchi, no rales, without accessory muscle use and no intercostal retractions  Cardiovascular: RRR, normal S1S2, no M/R/G  Gastrointestinal: soft, NTND, no masses palpable, BS normal  Extremities: Warm, well perfused, pulses equal bilateral upper and lower extremities, no edema, no clubbing

## 2024-09-22 NOTE — PROGRESS NOTE ADULT - SUBJECTIVE AND OBJECTIVE BOX
NATHANAEL MCCLENDON 78y Male  MRN#: [id number]   Hospital Day: 6d    SUBJECTIVE  Patient is a 78y old Male who presents with a chief complaint of diarrhea (21 Sep 2024 13:19)  Currently admitted to medicine with the primary diagnosis of Abdominal pain      INTERVAL HPI AND OVERNIGHT EVENTS:  Patient was examined and seen at bedside. This morning he is resting comfortably in bed and reports no issues or overnight events.    REVIEW OF SYMPTOMS:  feels okay  did not get much sleep last night       OBJECTIVE  PAST MEDICAL & SURGICAL HISTORY  HTN (hypertension)    HLD (hyperlipidemia)    Kidney stone    No significant past surgical history      ALLERGIES:  No Known Allergies    MEDICATIONS:  STANDING MEDICATIONS  amLODIPine   Tablet 5 milliGRAM(s) Oral daily  heparin   Injectable 5000 Unit(s) SubCutaneous every 12 hours  lactobacillus acidophilus 1 Tablet(s) Oral daily  loperamide 2 milliGRAM(s) Oral every 6 hours  metoprolol succinate ER 25 milliGRAM(s) Oral daily    PRN MEDICATIONS  acetaminophen     Tablet .. 650 milliGRAM(s) Oral every 6 hours PRN      VITAL SIGNS: Last 24 Hours  T(C): 36.9 (22 Sep 2024 05:40), Max: 37.1 (21 Sep 2024 13:58)  T(F): 98.4 (22 Sep 2024 05:40), Max: 98.7 (21 Sep 2024 13:58)  HR: 81 (22 Sep 2024 05:40) (81 - 89)  BP: 132/67 (22 Sep 2024 05:40) (119/71 - 132/67)  BP(mean): --  RR: 18 (22 Sep 2024 05:40) (18 - 18)  SpO2: 95% (22 Sep 2024 05:40) (95% - 99%)    LABS:                        12.2   12.72 )-----------( 301      ( 22 Sep 2024 09:04 )             34.0     09-22    139  |  103  |  31[H]  ----------------------------<  78  4.3   |  18  |  1.2    Ca    9.5      22 Sep 2024 09:04  Phos  2.5     09-22  Mg     2.0     09-22    TPro  5.3[L]  /  Alb  3.4[L]  /  TBili  4.1[H]  /  DBili  x   /  AST  301[H]  /  ALT  69[H]  /  AlkPhos  865[H]  09-22      Urinalysis Basic - ( 22 Sep 2024 09:04 )    Color: x / Appearance: x / SG: x / pH: x  Gluc: 78 mg/dL / Ketone: x  / Bili: x / Urobili: x   Blood: x / Protein: x / Nitrite: x   Leuk Esterase: x / RBC: x / WBC x   Sq Epi: x / Non Sq Epi: x / Bacteria: x      PHYSICAL EXAM:  Physical exam:   Constitutional: WDWN, NAD, thin   Heent: sclera icteric  Skin: icteric   Respiratory: CTA b/l, good air entry b/l, no wheezing, no rhonchi, no rales, without accessory muscle use and no intercostal retractions  Cardiovascular: RRR, normal S1S2, no M/R/G  Gastrointestinal: soft, NTND, no masses palpable, BS normal  Extremities: Warm, well perfused, pulses equal bilateral upper and lower extremities, no edema, no clubbing      ASSESSMENT & PLAN:    The patient is a 77 y/o male w/pmhx of HTN, HLD, hx of frequent nephrothiliasis and recent passage of a stone a few days ago w/trace hematuria, cervical neck arthritis previous use of opioid pain medication, presenting for 10 days of diarrhea, abdominal pain.Notes having 30-40 loose stools over the past week and over the past day he's had 3-5 loose stools.       #Bladder mass   #Innumerable bilobar hepatic masses,   #mildly enlarged upper abdominal lymph nodes  #foci of osseous metastasis  -Presented with c/o diarrhea   -CT A/p IMPRESSION: Dense irregularity within the urinary bladder measuring up to 5.3 cm   concerning for an underlying lesion. No hydronephrosis. Prominent retroperitoneal lymph nodes measuring up to 1 cm.Cirrhotic morphology of the liver. 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   49 , AFP 2.4,   Evaluated by urology, Pt. will need to F/U with Dr. Caldwell for further  management of bladder mass.  MR 9.24: Enlarged heterogeneous liver with innumerable bilobar hepatic masses,   suspicious for metastatic disease. Multiple subcentimeter and mildly enlarged upper abdominal lymph nodes, as detailed above, suspicious for lymph node involvement. Numerous foci of osseous metastasis, within the sacrum, bilateral iliac bones and several vertebral bodies. Partially imaged 3.9 cm enhancing lobulated soft tissue mass within the urinary bladder, suspicious for neoplasm.      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 He has also developed  liver failure with worsening transaminase, Bilirubin. These patients have poor prognosis.   Discussed with him and his son. If he wished to proceed  he would need inpatient biopsy followed by treatment. Likely regimen would be carboplatin/gemcitabine. Discussed that he could have severe side effects from chemotherapy and can potentially be fatal. Also discussed he can opt for hospice and be comfortable.   Patient wants to proceed with treatment and his son wishes the same for him.  Will request IR for inpatient biopsy tomorrow.   NPO PM   Hold anticoagulants   Coags in AM

## 2024-09-22 NOTE — CHART NOTE - NSCHARTNOTEFT_GEN_A_CORE
Dr. Quinn will be taking the patient for biopsy tomorrow I made him n.p.o. and I discontinued his heparin for DVT prophylaxis so he does not get it in the morning the DVT prophylaxis can start the next day after the biopsy.

## 2024-09-22 NOTE — PROGRESS NOTE ADULT - SUBJECTIVE AND OBJECTIVE BOX
Pt seen and examined at bedside. No diarrhea. Tolerating diet.         VITAL SIGNS (Last 24 hrs):  T(C): 36.9 (09-22-24 @ 05:40), Max: 36.9 (09-22-24 @ 05:40)  HR: 81 (09-22-24 @ 05:40) (81 - 89)  BP: 132/67 (09-22-24 @ 05:40) (119/71 - 132/67)  RR: 18 (09-22-24 @ 05:40) (18 - 18)  SpO2: 95% (09-22-24 @ 05:40) (95% - 99%)  Wt(kg): --  Daily     Daily     I&O's Summary    21 Sep 2024 07:01  -  22 Sep 2024 07:00  --------------------------------------------------------  IN: 0 mL / OUT: 400 mL / NET: -400 mL        PHYSICAL EXAM:  GENERAL: NAD, well-developed  HEAD:  Atraumatic, Normocephalic  EYES: EOMI, PERRLA, conjunctiva and sclera clear  NECK: Supple, No JVD  CHEST/LUNG: Clear to auscultation bilaterally; No wheeze  HEART: Regular rate and rhythm; No murmurs, rubs, or gallops  ABDOMEN: Soft, Nontender, Nondistended; Bowel sounds present  EXTREMITIES:  2+ Peripheral Pulses, No clubbing, cyanosis, or edema  PSYCH: AAOx3  NEUROLOGY: non-focal  SKIN: No rashes or lesions    Labs Reviewed  Spoke to patient in regards to abnormal labs.    CBC Full  -  ( 22 Sep 2024 09:04 )  WBC Count : 12.72 K/uL  Hemoglobin : 12.2 g/dL  Hematocrit : 34.0 %  Platelet Count - Automated : 301 K/uL  Mean Cell Volume : 86.3 fL  Mean Cell Hemoglobin : 31.0 pg  Mean Cell Hemoglobin Concentration : 35.9 g/dL  Auto Neutrophil # : 10.36 K/uL  Auto Lymphocyte # : 0.98 K/uL  Auto Monocyte # : 1.14 K/uL  Auto Eosinophil # : 0.05 K/uL  Auto Basophil # : 0.04 K/uL  Auto Neutrophil % : 81.4 %  Auto Lymphocyte % : 7.7 %  Auto Monocyte % : 9.0 %  Auto Eosinophil % : 0.4 %  Auto Basophil % : 0.3 %    BMP:    09-22 @ 09:04    Blood Urea Nitrogen - 31  Calcium - 9.5  Carbond Dioxide - 18  Chloride - 103  Creatinine - 1.2  Glucose - 78  Potassium - 4.3  Sodium - 139          Urine Culture:  09-17 @ 23:25 Urine culture: --    Culture Results:   Culture is being performed.  Method Type: --  Organism: --  Organism Identification: --  Specimen Source: .Stool cup            MEDICATIONS  (STANDING):  amLODIPine   Tablet 5 milliGRAM(s) Oral daily  heparin   Injectable 5000 Unit(s) SubCutaneous every 12 hours  lactobacillus acidophilus 1 Tablet(s) Oral daily  loperamide 2 milliGRAM(s) Oral every 6 hours  metoprolol succinate ER 25 milliGRAM(s) Oral daily    MEDICATIONS  (PRN):  acetaminophen     Tablet .. 650 milliGRAM(s) Oral every 6 hours PRN Severe Pain (7 - 10)

## 2024-09-22 NOTE — PROGRESS NOTE ADULT - ATTENDING COMMENTS
Josué was seen earlier today his son was at bedside we once again went over his situation LFTs continue to increase bili is 4.1 and alk phos is now 865 and rising AST.  I once again explained the situation to Josué and his son and I explained that giving chemotherapy is risky and hospice is appropriate.  But they were not ready for hospice and decided to proceed with biopsy and followd by chemotherapy, biopsy will confirm suspected metastatic bladder cancer recommend we start gemcitabine and carboplatin with reduced dose by 30% they understand the prognosis is poor,  toxicity is likely going to be high there is also significant risk of death with treatment.  Will keep patient n.p.o. and will try to expedite the biopsy tomorrow we will discuss with IR.    Patient will need to stay in the hospital in order to expedite treatment.

## 2024-09-23 NOTE — PROGRESS NOTE ADULT - ASSESSMENT
77 y/o male w/pmhx of HTN, HLD, hx of frequent nephrolithiasis with over 10 episodes in 2010, and recent passage of a stone a few days ago w/trace hematuria, cervical neck osteoarthritis previous use of opioid pain medication, presenting for 10 days of diarrhea.         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 Multiple subcentimeter and mildly enlarged upper abdominal lymph nodes, as detailed above, suspicious for lymph node involvement. Numerous foci of osseous metastasis, as detailed above. Partially imaged 3.9 cm enhancing lobulated soft tissue mass within the urinary bladder, suspicious for neoplasm.  - Urology - outpatient f/u for cystoscopy   - IR - planned for biopsy today   - Oncology following -Patient and son decided to proceed with treatment     #Dysuria   - check UA, urine culture     #Diarrhea - resolved   - CT abd negative for enterocolitis   - GI PCR panel negative, C. Diff negative  - AFB ngtd, bcx ngtd, ua negative     #KANWAL   - resolving     #HTN  - c/w home meds     #HLD  - hold statin due to elevated LFTs      DVT proph: heparin sub-q (holding for biopsy)      Pending:  biopsy today, UA, eventually chemo   Plan of care d/w pt   Dispo: Home

## 2024-09-23 NOTE — PROGRESS NOTE ADULT - SUBJECTIVE AND OBJECTIVE BOX
SUBJECTIVE/OVERNIGHT EVENTS  Today is hospital day 7d. This morning patient was seen and examined at bedside, resting comfortably in bed. No acute or major events overnight.    Pt complaints of burning w/ urination. Otherwise denies any complaints       CODE STATUS: full code     FAMILY COMMUNICATION  Contact date:  Name of person contacted:  Relationship to patient:  Communication details:    MEDICATIONS  STANDING MEDICATIONS  amLODIPine   Tablet 5 milliGRAM(s) Oral daily  lactated ringers. 1000 milliLiter(s) IV Continuous <Continuous>  lactobacillus acidophilus 1 Tablet(s) Oral daily  metoprolol succinate ER 25 milliGRAM(s) Oral daily    PRN MEDICATIONS  acetaminophen     Tablet .. 650 milliGRAM(s) Oral every 6 hours PRN  loperamide 2 milliGRAM(s) Oral every 6 hours PRN    VITALS  T(F): 98.2 (09-23-24 @ 14:05), Max: 98.7 (09-22-24 @ 19:55)  HR: 95 (09-23-24 @ 14:05) (62 - 95)  BP: 100/65 (09-23-24 @ 14:05) (100/65 - 135/65)  RR: 18 (09-23-24 @ 14:05) (16 - 18)  SpO2: 94% (09-23-24 @ 14:05) (93% - 98%)    PHYSICAL EXAM  GENERAL  ( x ) NAD, lying in bed comfortably     (  ) obtunded     (  ) lethargic     (  ) somnolent    HEAD  ( x ) Atraumatic     (  ) hematoma     (  ) laceration (specify location:       )     NECK  (x  ) Supple     (  ) neck stiffness     (  ) nuchal rigidity     (  )  no JVD     (  ) JVD present ( -- cm)    HEART  Rate -->  (x  ) normal rate    (  ) bradycardic    (  ) tachycardic  Rhythm -->  ( x ) regular    (  ) regularly irregular    (  ) irregularly irregular  Murmurs -->  ( x ) normal s1/s2    (  ) systolic murmur    (  ) diastolic murmur    (  ) continuous murmur     (  ) S3 present    (  ) S4 present    LUNGS  (x  )Unlabored respirations     (  ) tachypnea  (x ) B/L air entry     (  ) decreased breath sounds in:  (location     )    (x  ) no adventitious sound     (  ) crackles     (  ) wheezing      (  ) rhonchi      (specify location:       )  (  ) chest wall tenderness (specify location:       )    ABDOMEN  ( x ) Soft     (  ) tense   |   (  ) nondistended     (x  ) distended   |   (  ) +BS     (  ) hypoactive bowel sounds     (  ) hyperactive bowel sounds  ( x ) nontender     (  ) RUQ tenderness     (  ) RLQ tenderness     (  ) LLQ tenderness     (  ) epigastric tenderness     (  ) diffuse tenderness  (  ) Splenomegaly      ( x ) Hepatomegaly      (  ) Jaundice     (  ) ecchymosis     EXTREMITIES  (x  ) Normal     (  ) Rash     (  ) ecchymosis     (  ) varicose veins      (  ) pitting edema     (  ) non-pitting edema   (  ) ulceration     (  ) gangrene:     (location:     )    NERVOUS SYSTEM  ( x ) A&Ox3     (  ) confused     (  ) lethargic  CN II-XII:     (  ) Intact     (  ) focal deficits  (Specify:     )   Upper extremities:     (  ) strength X/5     (  ) focal deficit (specify:    )  Lower extremities:     (  ) strength  X/5    (  ) focal deficit (specify:    )    SKIN  ( x ) No rashes or lesions     (  ) maculopapular rash     (  ) pustules     (  ) vesicles     (  ) ulcer     (  ) ecchymosis     (specify location:     )    (  ) Indwelling [place] Catheter   Date insterted:    Reason (  ) Critical illness     (  ) urinary retention    (  ) Accurate Ins/Outs Monitoring     (  ) CMO patient    (  ) Central Line  Date inserted:  Location: (  ) Right IJ   (  ) Left IJ   (  ) Right Fem   (  ) Left Fem    (  ) SPC  (  ) pigtail  (  ) PEG tube  (  ) colostomy  (  ) jejunostomy  (  ) U-Dall    LABS             11.7   12.20 )-----------( 292      ( 09-23-24 @ 08:37 )             32.4     143  |  107  |  30  -------------------------<  80   09-23-24 @ 08:37  3.8  |  21  |  1.2    Ca      9.4     09-23-24 @ 08:37  Phos   2.5     09-23-24 @ 08:37  Mg     1.9     09-23-24 @ 08:37    TPro  5.2  /  Alb  3.1  /  TBili  4.2  /  DBili  x   /  AST  292  /  ALT  70  /  AlkPhos  981  /  GGT  x     09-23-24 @ 08:37        Urinalysis Basic - ( 23 Sep 2024 08:37 )    Color: x / Appearance: x / SG: x / pH: x  Gluc: 80 mg/dL / Ketone: x  / Bili: x / Urobili: x   Blood: x / Protein: x / Nitrite: x   Leuk Esterase: x / RBC: x / WBC x   Sq Epi: x / Non Sq Epi: x / Bacteria: x          IMAGING

## 2024-09-23 NOTE — PROGRESS NOTE ADULT - ASSESSMENT
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  Patient ate out on 9/6 and began to experience abdominal pain and diarrhea. Denies any vomiting but endorses nausea, notes having 30-40 loose stools over the past week and over the past day he's had 3-5 loose stools. No melena, no hematochezia, describes stools as watery and filled with mucus. No prior history of this in the past, last colonoscopy was 8 years ago and was normal per patient. Notes 8/10 epigastric and periumbilical pain. Decreased appetite due to pain and diarrhea. No fevers, no chills, no sick contacts, does not endorse eating anything out of the ordinary, does not recall his meal prior to the onset of symptoms. No cp, sob, no loc, no acute vision or hearing changes. No other ros symptoms reported.     #bladder mass  - Dense irregularity within the urinary bladder measuring up to 5.3 cm   concerning for an underlying lesion. No hydronephrosis.  -Pt undergoing biopsy today  - urology evaluated - o/p f/u  - MR abdomen --> masses in liver suspicious for mets    #?Liver mets   #transaminitis  -pt has history of fentanyl patch use - could be accounting for diarrhea with w/d diarrhea  -pt states two weeks when abdominal pain and diarrhea started, he had shrimp/seafood from [employer]  -on fentayl patch due to cervical neck pain that is chronic - does not want at this time  - CT a/p showing cirrhotic morphology - suspect metastasis from bladder tumor    - RUQ US: cirrhotic liver  - t bili 2.6 --> 2.1 --> 3.1  -  ALT 70  - lipase 81  - hepatitis panel negative  - GI PCR panel negative, C. Diff negative  - AFB ngtd, bcx ngtd, ua negative   - stool ova and parasites pending  - Not on antibx  - stool electrolytes to calculate stool osm gap  - Hepatology consult noted  - CA 19-9 +ve, AFP -ve, HIV -ve  - f/u CLD workup    #kanwal, HAGMA (resolved)  -suspect HAGMA in setting of KANWAL  cr 2.4 (unknown baseline)   - kanwal likely pre-renal from dehydration     #HTN  - restart amlodipine  - resart metoprolol  - with hold paramaters  - hold ACE in setting of KANWAL    #HLD  - hold statin due to elevated LFTs  - calculate ASCVD to see if pt requires statin at this time vs lifestyle changes, may need alternative antilipid med    #Nephrolithiasis   - notes passing kidney stones in past and had one recently   - consider US for further eval       DVT proph: heparin sub-q   GI ppx - no ppi  discussed GOC: full code  The patient is a 79 y/o male w/pmhx of HTN, HLD, 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  Patient ate out on 9/6 and began to experience abdominal pain and diarrhea. Denies any vomiting but endorses nausea, notes having 30-40 loose stools over the past week and over the past day he's had 3-5 loose stools. No melena, no hematochezia, describes stools as watery and filled with mucus. No prior history of this in the past, last colonoscopy was 8 years ago and was normal per patient. Notes 8/10 epigastric and periumbilical pain. Decreased appetite due to pain and diarrhea. No fevers, no chills, no sick contacts, does not endorse eating anything out of the ordinary, does not recall his meal prior to the onset of symptoms. No cp, sob, no loc, no acute vision or hearing changes. No other ros symptoms reported.     #bladder mass  - Dense irregularity within the urinary bladder measuring up to 5.3 cm   - MR abdomen --> Enlarged heterogeneous liver with innumerable bilobar hepatic masses, suspicious for metastatic disease. Multiple subcentimeter and mildly enlarged upper abdominal lymph nodes, as detailed above, suspicious for lymph node involvement. Numerous foci of osseous metastasis, as detailed above. Partially imaged 3.9 cm enhancing lobulated soft tissue mass within the urinary bladder, suspicious for neoplasm.  concerning for an underlying lesion. No hydronephrosis.  -Pt undergoing biopsy w/ IR today  - urology evaluated - o/p f/u  -Pt to undergo treatment w/ heme-onc per family decision    #?Liver mets   #transaminitis  diarrhea (resolved)  -pt has history of fentanyl patch use - could be accounting for diarrhea with w/d diarrhea  -pt states two weeks when abdominal pain and diarrhea started, he had shrimp/seafood from [employer] market  -on fentayl patch due to cervical neck pain that is chronic - does not want at this time  - CT a/p showing cirrhotic morphology - suspect metastasis from bladder tumor    - RUQ US: cirrhotic liver  - t bili 2.6 --> 2.1 --> 3.1--> 4.2  -  ALT 70  - lipase 81  - hepatitis panel negative  - GI PCR panel negative, C. Diff negative  - AFB ngtd, bcx ngtd, ua negative   - Not on antibx  - Hepatology consult noted  - CA 19-9 +ve, AFP -ve, HIV -ve  - f/u CLD workup    #dysuria  Pt complaints of burning with urination today  -f/u UA w/ culture       #kanwal, HAGMA (resolved)  -suspect HAGMA in setting of KANWAL  cr 1.2 today  - kanwal likely pre-renal from dehydration     #HTN  - c/w amlodipine 5mg  - c/w metop succinate 25mg daily  - hold ACE in setting of KANWAL    #HLD  - hold statin due to elevated LFTs    #Nephrolithiasis   - notes passing kidney stones in past and had one recently     DVT proph: heparin held for biopsy   GI ppx - no ppi  Diet: regular, low fiber   discussed GOC: full code  The patient is a 79 y/o male w/pmhx of HTN, HLD, 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  Patient ate out on 9/6 and began to experience abdominal pain and diarrhea. Denies any vomiting but endorses nausea, notes having 30-40 loose stools over the past week and over the past day he's had 3-5 loose stools. No melena, no hematochezia, describes stools as watery and filled with mucus. No prior history of this in the past, last colonoscopy was 8 years ago and was normal per patient. Notes 8/10 epigastric and periumbilical pain. Decreased appetite due to pain and diarrhea. No fevers, no chills, no sick contacts, does not endorse eating anything out of the ordinary, does not recall his meal prior to the onset of symptoms. No cp, sob, no loc, no acute vision or hearing changes. No other ros symptoms reported.     #bladder mass  - Dense irregularity within the urinary bladder measuring up to 5.3 cm   - MR abdomen --> Enlarged heterogeneous liver with innumerable bilobar hepatic masses, suspicious for metastatic disease. Multiple subcentimeter and mildly enlarged upper abdominal lymph nodes, as detailed above, suspicious for lymph node involvement. Numerous foci of osseous metastasis, as detailed above. Partially imaged 3.9 cm enhancing lobulated soft tissue mass within the urinary bladder, suspicious for neoplasm.  concerning for an underlying lesion. No hydronephrosis.  -Pt s/p biopsy with IR yesterday      -f/u results  - urology evaluated - o/p f/u  -Pt to undergo treatment w/ heme-onc per family decision    #?Liver mets   #transaminitis  diarrhea (resolved)  -pt has history of fentanyl patch use - could be accounting for diarrhea with w/d diarrhea  -pt states two weeks when abdominal pain and diarrhea started, he had shrimp/seafood from [place] [employer]  - CT a/p showing cirrhotic morphology - suspect metastasis from bladder tumor    - RUQ US: cirrhotic liver  - t bili 2.6 --> 2.1 --> 3.1--> 4.2  -  ALT 70  - lipase 81  - hepatitis panel negative  - GI PCR panel negative, C. Diff negative  - AFB ngtd, bcx ngtd, ua negative   - Not on antibx  - Hepatology consult noted  - CA 19-9 +ve, AFP -ve, HIV -ve  - f/u CLD workup  -oxycodone 5mg q6hr added for pain control s/p biopsy     #dysuria  Pt complaints of burning with urination today  -f/u UA w/ culture       #kanwal, HAGMA (resolved)  -suspect HAGMA in setting of KANWAL  cr 1.2 today  - kanwal likely pre-renal from dehydration     #HTN  - c/w amlodipine 5mg  - c/w metop succinate 25mg daily  - hold ACE in setting of KANWAL    #HLD  - hold statin due to elevated LFTs    #Nephrolithiasis   - notes passing kidney stones in past and had one recently     DVT proph: heparin held for biopsy   GI ppx - no ppi  Diet: regular, low fiber   discussed GOC: full code

## 2024-09-23 NOTE — PROGRESS NOTE ADULT - SUBJECTIVE AND OBJECTIVE BOX
Pt seen and examined at bedside. Reports dysuria. No abd pain. No diarrhea.       VITAL SIGNS (Last 24 hrs):  T(C): 36.3 (09-23-24 @ 04:30), Max: 37.1 (09-22-24 @ 19:55)  HR: 87 (09-23-24 @ 04:30) (86 - 88)  BP: 135/65 (09-23-24 @ 04:30) (107/61 - 135/65)  RR: 18 (09-23-24 @ 04:30) (17 - 18)  SpO2: 98% (09-22-24 @ 19:55) (96% - 98%)  Wt(kg): --  Daily     Daily     I&O's Summary    22 Sep 2024 07:01  -  23 Sep 2024 07:00  --------------------------------------------------------  IN: 0 mL / OUT: 200 mL / NET: -200 mL        PHYSICAL EXAM:  GENERAL: NAD, chronically ill appearing   HEAD:  Atraumatic, Normocephalic  EYES: EOMI, PERRLA, conjunctiva and sclera clear  NECK: Supple, No JVD  CHEST/LUNG: Clear to auscultation bilaterally; No wheeze  HEART: Regular rate and rhythm; No murmurs, rubs, or gallops  ABDOMEN: Soft, Nontender, Nondistended; hepatomegaly   EXTREMITIES:  2+ Peripheral Pulses, No clubbing, cyanosis, or edema  PSYCH: AAOx3  NEUROLOGY: non-focal  SKIN: No rashes or lesions    Labs Reviewed        CBC Full  -  ( 23 Sep 2024 08:37 )  WBC Count : 12.20 K/uL  Hemoglobin : 11.7 g/dL  Hematocrit : 32.4 %  Platelet Count - Automated : 292 K/uL  Mean Cell Volume : 86.9 fL  Mean Cell Hemoglobin : 31.4 pg  Mean Cell Hemoglobin Concentration : 36.1 g/dL  Auto Neutrophil # : 9.99 K/uL  Auto Lymphocyte # : 0.86 K/uL  Auto Monocyte # : 1.12 K/uL  Auto Eosinophil # : 0.04 K/uL  Auto Basophil # : 0.04 K/uL  Auto Neutrophil % : 82.0 %  Auto Lymphocyte % : 7.0 %  Auto Monocyte % : 9.2 %  Auto Eosinophil % : 0.3 %  Auto Basophil % : 0.3 %    BMP:    09-23 @ 08:37    Blood Urea Nitrogen - 30  Calcium - 9.4  Carbond Dioxide - 21  Chloride - 107  Creatinine - 1.2  Glucose - 80  Potassium - 3.8  Sodium - 143      Hemoglobin A1c -     Urine Culture:            MEDICATIONS  (STANDING):  amLODIPine   Tablet 5 milliGRAM(s) Oral daily  lactated ringers. 1000 milliLiter(s) (60 mL/Hr) IV Continuous <Continuous>  lactobacillus acidophilus 1 Tablet(s) Oral daily  metoprolol succinate ER 25 milliGRAM(s) Oral daily    MEDICATIONS  (PRN):  acetaminophen     Tablet .. 650 milliGRAM(s) Oral every 6 hours PRN Severe Pain (7 - 10)  loperamide 2 milliGRAM(s) Oral every 6 hours PRN Diarrhea   89 year old female with history of HTN, diverticulosis, and B12 deficiency presents with anemia.   patient has been having weakness, loss of appetite, headache worsening for 5 days.   last month she was admitted to [facility] for similar presentation, found to be anemia, received transfusion and discharged on B12 injections.   was doing relatively well until 5 days when she stared to have similar symptoms again with jaundice this time.   denies chest pain, nausea, vomiting. reports on/off LLQ for the last month.   Blood work from last admission showed hemolytic picture ( low haptoglobin, elevated LDH .. )   In ED patient was hemodynamically stable, received one unit and had unremarkable CT abdomin     PAST MEDICAL & SURGICAL HISTORY:  TIA (transient ischemic attack)  Diverticulitis  Constipation  Hypertension  No significant past surgical history    PAST MEDICAL & SURGICAL HISTORY:  TIA (transient ischemic attack)  Diverticulitis  Constipation  Hypertension  No significant past surgical history    Vital Signs Last 24 Hrs  T(C): 35.6 (06 Oct 2019 04:46), Max: 36.2 (05 Oct 2019 19:50)  T(F): 96 (06 Oct 2019 04:46), Max: 97.2 (05 Oct 2019 19:50)  HR: 71 (06 Oct 2019 04:46) (71 - 83)  BP: 167/80 (06 Oct 2019 04:46) (127/56 - 167/80)  BP(mean): --  RR: 20 (06 Oct 2019 04:46) (20 - 20)  SpO2: 97% (06 Oct 2019 00:49) (97% - 97%)    Physical Exam:    GENERAL: jaundice, thin elderly female   HEAD:  Atraumatic, Normocephalic  ENT: Moist mucous membranes  CHEST/LUNG: Clear to auscultation bilaterally;   HEART: Regular rate and rhythm; No murmurs, rubs, or gallops  ABDOMEN: Soft, Nontender, Nondistended.   EXTREMITIES:  brisk capillary refill. No clubbing, cyanosis, or edema  NERVOUS SYSTEM:  Alert & Oriented X3, speech clear. No deficits                           5.9    5.92  )-----------( 146      ( 06 Oct 2019 00:57 )             19.7     10-05    137  |  103  |  29<H>  ----------------------------<  158<H>  4.4   |  17  |  1.3    Ca    9.6      05 Oct 2019 09:13    TPro  5.8<L>  /  Alb  4.1  /  TBili  6.9<H>  /  DBili  1.1<H>  /  AST  51<H>  /  ALT  11  /  AlkPhos  72  10-05                             Bilirubin Direct, Serum (10.05.19 @ 01:00)    Bilirubin Direct, Serum: 1.1 mg/dL    Gamma Glutamyl Transferase, Serum (10.05.19 @ 01:00)    Gamma Glutamyl Transferase, Serum: 9 U/L    Lactate Dehydrogenase, Serum (10.05.19 @ 01:00)    Lactate Dehydrogenase, Serum: 784    Direct Lorna Profile (10.04.19 @ 20:10)    Dir Antiglob Polyspecific Interpretation: POS: 10/04/2019 23:17  AVILLAFRAN  TYPE:(C=Critical, N=Notification, A=Abnormal) C  TESTS: _DAT  DATE/TIME CALLED: _[id number]  CALLED TO: _Dr Lewis  READ BACK (2 Patient Identifiers)(Y/N): _Y  READ BACK VALUES (Y/N): _Y  CALLED BY: _AT    Bilirubin Total, Serum: 7.8 mg/dL (10.04.19 @ 18:20)       EXAM:  US ABDOMEN LIMITED            PROCEDURE DATE:  10/05/2019            INTERPRETATION:  CLINICAL HISTORY: Jaundice.    COMPARISON: None.    PROCEDURE: Ultrasound of the right upper quadrant was performed.    FINDINGS:    LIVER:  Normal in contour and echogenicity measuring 14.3 cm in length,   containing multiple cysts the largest measuring up to approximately 2.2 x   2.4 x 2.3 cm.    GALLBLADDER/BILIARY TREE:  No evidence of cholelithiasis. No wall   thickening or pericholecystic fluid.  Negative sonographic Cameron's sign.   No intrahepatic biliary ductal dilatation. The common bile duct measures   7 mm, which is normal or age.     PANCREAS: Obscured by overlying bowel gas.    KIDNEY:  Right kidney measures 9.2 cm in length, with a mid pole cyst   measuring up to 0.8 cm. No hydronephrosis, calculi or solid mass.    AORTA/IVC:  Visualized proximal portions unremarkable.    ASCITES:  None.    IMPRESSION:    Essentially unremarkable right upper quadrant ultrasound.  Nonvisualization of the pancreas.              ARUN LOTT M.D., RESIDENT RADIOLOGIST  This document has been electronically signed.  CHRISTINA LEES M.D., ATTENDING RADIOLOGIST  This document has been electronically signed. Oct  5 2019  7:17AM        EXAM:  CT ABDOMEN AND PELVIS IC            PROCEDURE DATE:  10/04/2019            INTERPRETATION:  CLINICAL STATEMENT: Left lower quadrant abdominal pain.      TECHNIQUE: Contiguous axial CT images were obtained from the lower chest   to the pubic symphysis following administration of 100cc Optiray 320   intravenous contrast.  Oral contrast was not administered.  Reformatted   images in the coronal and sagittal planes were acquired.    COMPARISON CT: CT of the abdomen and pelvis dated 4/2/2018.      FINDINGS:    LOWER CHEST: Partially imaged right sided pleural effusion. Bilateral   subsegmental and dependent atelectasis also seen.    HEPATOBILIARY: Right hepatic cyst and additional subcentimeter hepatic   hypodensities, too small to further characterize.    SPLEEN: Unremarkable.    PANCREAS: Unchanged pancreatic tail hypodense lesion, likely a side   branch IPMN.    ADRENAL GLANDS: Unremarkable.    KIDNEYS: Symmetric renal enhancement. No hydronephrosis. Bilateral renal   cysts and additional subcentimeter bilateral hypodensities, too small to   further characterize.    ABDOMINOPELVIC NODES: No lymphadenopathy.    PELVIC ORGANS: Unremarkable.    PERITONEUM/MESENTERY/BOWEL: Sigmoid diverticulosis without definitive   evidence of diverticulitis. No bowel obstruction or pneumoperitoneum.   Large hiatal hernia.    BONES/SOFT TISSUES: No acute osseous abnormality. Degenerative changes of   the spine. Small fat-containing umbilical hernia and bilateral inguinal   hernias.    OTHER: Aortic atherosclerosis.      IMPRESSION:     No CT evidence of acute intra-abdominal pathology.    Partially imaged right-sided pleural effusion.    Additional Findings/Recommendations After Attending Radiologist Review:    Mild splenomegaly, new.              ARUN LOTT M.D., RESIDENT RADIOLOGIST  This document has been electronically signed.  IRON DUARTE M.D., ATTENDING RADIOLOGIST  This document has been electronically signed. Oct  4 2019 11:47PM

## 2024-09-24 NOTE — PROGRESS NOTE ADULT - ASSESSMENT
79 y/o male w/pmhx of HTN, HLD, hx of frequent nephrolithiasis with over 10 episodes in 2010, and recent passage of a stone a few days ago w/trace hematuria, cervical neck osteoarthritis previous use of opioid pain medication, presenting for 10 days of diarrhea.         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 Multiple subcentimeter and mildly enlarged upper abdominal lymph nodes, as detailed above, suspicious for lymph node involvement. Numerous foci of osseous metastasis, as detailed above. Partially imaged 3.9 cm enhancing lobulated soft tissue mass within the urinary bladder, suspicious for neoplasm.  - Urology - outpatient f/u for cystoscopy   - IR - s/p liver biopsy  - f/u results   - Oncology following -Patient and son decided to proceed with treatment     #Diarrhea - resolved   - CT abd negative for enterocolitis   - GI PCR panel negative, C. 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 Multiple subcentimeter and mildly enlarged upper abdominal lymph nodes, as detailed above, suspicious for lymph node involvement. Numerous foci of osseous metastasis, as detailed above. Partially imaged 3.9 cm enhancing lobulated soft tissue mass within the urinary bladder, suspicious for neoplasm.  - Urology - outpatient f/u for cystoscopy   - IR - s/p liver biopsy  - f/u results   - Oncology following -Patient and son decided to proceed with treatment     #HAGMA - suspect type 2 lactic acidosis from advanced liver dysfunction     #Diarrhea - resolved   - CT abd negative for enterocolitis   - GI PCR panel negative, C. Diff negative  - AFB ngtd, bcx ngtd, ua negative     #KANWAL   - resolving     #HTN  - c/w home meds     #HLD  - hold statin due to elevated LFTs     DVT proph: heparin sub-q      Grave prognosis      Pending:  biopsy results and eventually chemo   Plan of care d/w pt   Dispo: Home

## 2024-09-24 NOTE — PROGRESS NOTE ADULT - SUBJECTIVE AND OBJECTIVE BOX
Pt seen and examined at bedside.  Reports pain at biopsy site.         VITAL SIGNS (Last 24 hrs):  T(C): 37.2 (09-24-24 @ 14:49), Max: 37.2 (09-24-24 @ 14:49)  HR: 83 (09-24-24 @ 14:49) (75 - 83)  BP: 103/51 (09-24-24 @ 14:49) (103/51 - 126/66)  RR: 18 (09-24-24 @ 14:49) (18 - 18)  SpO2: 100% (09-24-24 @ 14:49) (92% - 100%)  Wt(kg): --  Daily     Daily     I&O's Summary      PHYSICAL EXAM:  GENERAL: NAD   HEAD:  Atraumatic, Normocephalic  EYES: conjunctiva and sclera clear  NECK: Supple, No JVD  CHEST/LUNG: Clear to auscultation bilaterally; No wheeze  HEART: Regular rate and rhythm; No murmurs, rubs, or gallops  ABDOMEN: Soft, mild tenderness at biopsy site.    EXTREMITIES:  2+ Peripheral Pulses, No clubbing, cyanosis, or edema  SKIN: No rashes or lesions    Labs Reviewed       CBC Full  -  ( 24 Sep 2024 08:36 )  WBC Count : 10.78 K/uL  Hemoglobin : 11.2 g/dL  Hematocrit : 31.1 %  Platelet Count - Automated : 246 K/uL  Mean Cell Volume : 86.9 fL  Mean Cell Hemoglobin : 31.3 pg  Mean Cell Hemoglobin Concentration : 36.0 g/dL  Auto Neutrophil # : 8.69 K/uL  Auto Lymphocyte # : 0.74 K/uL  Auto Monocyte # : 1.17 K/uL  Auto Eosinophil # : 0.01 K/uL  Auto Basophil # : 0.02 K/uL  Auto Neutrophil % : 80.5 %  Auto Lymphocyte % : 6.9 %  Auto Monocyte % : 10.9 %  Auto Eosinophil % : 0.1 %  Auto Basophil % : 0.2 %    BMP:    09-24 @ 08:36    Blood Urea Nitrogen - 27  Calcium - 9.3  Carbond Dioxide - 17  Chloride - 103  Creatinine - 1.0  Glucose - 79  Potassium - 4.3  Sodium - 141      Hemoglobin A1c -     Urine Culture:            MEDICATIONS  (STANDING):  amLODIPine   Tablet 5 milliGRAM(s) Oral daily  chlorhexidine 2% Cloths 1 Application(s) Topical daily  heparin   Injectable 5000 Unit(s) SubCutaneous every 12 hours  lactated ringers. 1000 milliLiter(s) (60 mL/Hr) IV Continuous <Continuous>  lactobacillus acidophilus 1 Tablet(s) Oral daily  metoprolol succinate ER 25 milliGRAM(s) Oral daily  polyethylene glycol 3350 17 Gram(s) Oral every 24 hours  senna 2 Tablet(s) Oral at bedtime    MEDICATIONS  (PRN):  acetaminophen     Tablet .. 650 milliGRAM(s) Oral every 6 hours PRN Severe Pain (7 - 10)  loperamide 2 milliGRAM(s) Oral every 6 hours PRN Diarrhea  oxyCODONE    IR 5 milliGRAM(s) Oral every 6 hours PRN Severe Pain (7 - 10)

## 2024-09-25 NOTE — PROGRESS NOTE ADULT - SUBJECTIVE AND OBJECTIVE BOX
SUBJECTIVE/OVERNIGHT EVENTS  Today is hospital day 9d. This morning patient was seen and examined at bedside, resting comfortably in bed. No acute or major events overnight.    Pt states his pain from the biopsy site is improved. Denies any other complaints.       CODE STATUS: full code     FAMILY COMMUNICATION  Contact date:  Name of person contacted:  Relationship to patient:  Communication details:    MEDICATIONS  STANDING MEDICATIONS  chlorhexidine 2% Cloths 1 Application(s) Topical daily  heparin   Injectable 5000 Unit(s) SubCutaneous every 12 hours  lactated ringers. 1000 milliLiter(s) IV Continuous <Continuous>  lactobacillus acidophilus 1 Tablet(s) Oral daily  polyethylene glycol 3350 17 Gram(s) Oral every 24 hours  senna 2 Tablet(s) Oral at bedtime    PRN MEDICATIONS  acetaminophen     Tablet .. 650 milliGRAM(s) Oral every 6 hours PRN  loperamide 2 milliGRAM(s) Oral every 6 hours PRN  oxyCODONE    IR 5 milliGRAM(s) Oral every 6 hours PRN    VITALS  T(F): 98.6 (09-25-24 @ 05:09), Max: 98.9 (09-24-24 @ 14:49)  HR: 85 (09-25-24 @ 05:09) (83 - 86)  BP: 112/59 (09-25-24 @ 05:09) (103/51 - 120/69)  RR: 18 (09-25-24 @ 05:09) (18 - 18)  SpO2: 92% (09-25-24 @ 05:09) (92% - 100%)    PHYSICAL EXAM  GENERAL  (x  ) NAD, lying in bed comfortably     (  ) obtunded     (  ) lethargic     (  ) somnolent    HEAD  (  x) Atraumatic     (  ) hematoma     (  ) laceration (specify location:       )     NECK  (x  ) Supple     (  ) neck stiffness     (  ) nuchal rigidity     (  )  no JVD     (  ) JVD present ( -- cm)    HEART  Rate -->  (x  ) normal rate    (  ) bradycardic    (  ) tachycardic  Rhythm -->  ( x ) regular    (  ) regularly irregular    (  ) irregularly irregular  Murmurs -->  ( x ) normal s1/s2    (  ) systolic murmur    (  ) diastolic murmur    (  ) continuous murmur     (  ) S3 present    (  ) S4 present    LUNGS  ( x )Unlabored respirations     (  ) tachypnea  ( x ) B/L air entry     (  ) decreased breath sounds in:  (location     )    ( x ) no adventitious sound     (  ) crackles     (  ) wheezing      (  ) rhonchi      (specify location:       )  (  ) chest wall tenderness (specify location:       )    ABDOMEN  (x  ) Soft     (  ) tense   |   (  ) nondistended     ( x ) distended   |   (  ) +BS     (  ) hypoactive bowel sounds     (  ) hyperactive bowel sounds  (  ) nontender     (  ) RUQ tenderness     (x  ) RLQ tenderness     (  ) LLQ tenderness     (  ) epigastric tenderness     (  ) diffuse tenderness  (  ) Splenomegaly      ( x ) Hepatomegaly      (  ) Jaundice     (  ) ecchymosis     EXTREMITIES  ( x ) Normal     (  ) Rash     (  ) ecchymosis     (  ) varicose veins      (  ) pitting edema     (  ) non-pitting edema   (  ) ulceration     (  ) gangrene:     (location:     )    NERVOUS SYSTEM  (x  ) A&Ox3     (  ) confused     (  ) lethargic  CN II-XII:     (  ) Intact     (  ) focal deficits  (Specify:     )   Upper extremities:     (  ) strength X/5     (  ) focal deficit (specify:    )  Lower extremities:     (  ) strength  X/5    (  ) focal deficit (specify:    )    SKIN  (x  ) No rashes or lesions     (  ) maculopapular rash     (  ) pustules     (  ) vesicles     (  ) ulcer     (  ) ecchymosis     (specify location:     )    (  ) Indwelling [place] Catheter   Date insterted:    Reason (  ) Critical illness     (  ) urinary retention    (  ) Accurate Ins/Outs Monitoring     (  ) CMO patient    (  ) Central Line  Date inserted:  Location: (  ) Right IJ   (  ) Left IJ   (  ) Right Fem   (  ) Left Fem    (  ) SPC  (  ) pigtail  (  ) PEG tube  (  ) colostomy  (  ) jejunostomy  (  ) U-Dall    LABS             10.5   11.63 )-----------( 303      ( 09-25-24 @ 06:02 )             29.4     141  |  105  |  26  -------------------------<  85   09-25-24 @ 06:02  3.8  |  21  |  1.1    Ca      9.3     09-25-24 @ 06:02  Mg     1.8     09-25-24 @ 06:02    TPro  4.9  /  Alb  2.9  /  TBili  4.0  /  DBili  x   /  AST  270  /  ALT  65  /  AlkPhos  850  /  GGT  x     09-25-24 @ 06:02        Urinalysis Basic - ( 25 Sep 2024 06:02 )    Color: x / Appearance: x / SG: x / pH: x  Gluc: 85 mg/dL / Ketone: x  / Bili: x / Urobili: x   Blood: x / Protein: x / Nitrite: x   Leuk Esterase: x / RBC: x / WBC x   Sq Epi: x / Non Sq Epi: x / Bacteria: x          IMAGING

## 2024-09-25 NOTE — PROGRESS NOTE ADULT - ATTENDING COMMENTS
Medicine Attending Addendum  Patient was seen and examined with medicine team.  Nursing records reviewed. I agree with the resident/PA/NP's note including past medical history, home medications, social history, allergies, surgical history, family history, and review of system. I have reviewed relevant vitals, laboratory values, imaging studies, and microbiology.   - Above resident's note was edited by me  - pending biopsy results  - rest of A/P as per detailed housestaff note above except above modifications    Total time spent to complete patient's bedside assessment, reviewed medical chart, discussed medical plan of care with team was more than 35 minutes with >50% of time spent face to face with patient, discussion with patient/family and/or coordination of care.

## 2024-09-25 NOTE — PROGRESS NOTE ADULT - ASSESSMENT
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  Patient ate out on 9/6 and began to experience abdominal pain and diarrhea. Denies any vomiting but endorses nausea, notes having 30-40 loose stools over the past week and over the past day he's had 3-5 loose stools. No melena, no hematochezia, describes stools as watery and filled with mucus. No prior history of this in the past, last colonoscopy was 8 years ago and was normal per patient. Notes 8/10 epigastric and periumbilical pain. Decreased appetite due to pain and diarrhea. No fevers, no chills, no sick contacts, does not endorse eating anything out of the ordinary, does not recall his meal prior to the onset of symptoms. No cp, sob, no loc, no acute vision or hearing changes. No other ros symptoms reported.     #bladder mass  - Dense irregularity within the urinary bladder measuring up to 5.3 cm   - MR abdomen --> Enlarged heterogeneous liver with innumerable bilobar hepatic masses, suspicious for metastatic disease. Multiple subcentimeter and mildly enlarged upper abdominal lymph nodes, as detailed above, suspicious for lymph node involvement. Numerous foci of osseous metastasis, as detailed above. Partially imaged 3.9 cm enhancing lobulated soft tissue mass within the urinary bladder, suspicious for neoplasm.  concerning for an underlying lesion. No hydronephrosis.  -Pt s/p biopsy with IR 9/23      -f/u results      -f/u heme-onc  - urology evaluated - o/p f/u  -Pt to undergo treatment w/ heme-onc per family decision    #?Liver mets   #transaminitis  diarrhea (resolved)  -pt has history of fentanyl patch use - could be accounting for diarrhea with w/d diarrhea  -pt states two weeks when abdominal pain and diarrhea started, he had shrimp/seafood from [employer]  - CT a/p showing cirrhotic morphology - suspect metastasis from bladder tumor    - RUQ US: cirrhotic liver  - t bili 2.6 --> 2.1 --> 3.1--> 4.2  -  ALT 70  - lipase 81  - hepatitis panel negative  - GI PCR panel negative, C. Diff negative  - AFB ngtd, bcx ngtd, ua negative   - Not on antibx  - Hepatology consult noted  - CA 19-9 +ve, AFP -ve, HIV -ve, IgM 27  - CLD workup neg   -oxycodone 5mg q6hr added for pain control s/p biopsy     #dysuria  Pt complaints of burning with urination   -UA +ketones, protein, small LE, blood       #kanwal, HAGMA (resolved)  -suspect HAGMA in setting of KANWAL  cr 1.1 today  - kanwal likely pre-renal from dehydration     #HTN  -currently on no meds- BP WNL   - hold ACE in setting of KANWAL    #HLD  - hold statin due to elevated LFTs    #Nephrolithiasis   - notes passing kidney stones in past and had one recently     DVT proph: heparin held for biopsy   GI ppx - no ppi  Diet: regular, low fiber   discussed GOC: full code      The patient is a 79 y/o male w/pmhx of HTN, HLD, 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  Patient ate out on 9/6 and began to experience abdominal pain and diarrhea. Denies any vomiting but endorses nausea, notes having 30-40 loose stools over the past week and over the past day he's had 3-5 loose stools. No melena, no hematochezia, describes stools as watery and filled with mucus. No prior history of this in the past, last colonoscopy was 8 years ago and was normal per patient. Notes 8/10 epigastric and periumbilical pain. Decreased appetite due to pain and diarrhea. No fevers, no chills, no sick contacts, does not endorse eating anything out of the ordinary, does not recall his meal prior to the onset of symptoms. No cp, sob, no loc, no acute vision or hearing changes. No other ros symptoms reported.     #bladder mass  - Dense irregularity within the urinary bladder measuring up to 5.3 cm   - MR abdomen --> Enlarged heterogeneous liver with innumerable bilobar hepatic masses, suspicious for metastatic disease. Multiple subcentimeter and mildly enlarged upper abdominal lymph nodes, as detailed above, suspicious for lymph node involvement. Numerous foci of osseous metastasis, as detailed above. Partially imaged 3.9 cm enhancing lobulated soft tissue mass within the urinary bladder, suspicious for neoplasm.  concerning for an underlying lesion. No hydronephrosis.  -Pt s/p biopsy with IR 9/23      -f/u results      -f/u heme-onc  - urology evaluated - o/p f/u  -Pt to undergo treatment w/ heme-onc per family decision    #?Liver mets   #transaminitis  diarrhea (resolved)  -pt has history of fentanyl patch use - could be accounting for diarrhea with w/d diarrhea  -pt states two weeks when abdominal pain and diarrhea started, he had shrimp/seafood from [employer]  - CT a/p showing cirrhotic morphology - suspect metastasis from bladder tumor    - RUQ US: cirrhotic liver  - t bili 2.6 --> 2.1 --> 3.1--> 4.2  -  ALT 70  - lipase 81  - hepatitis panel negative  - GI PCR panel negative, C. Diff negative  - AFB ngtd, bcx ngtd, ua negative   - Not on antibx  - Hepatology consult noted  - CA 19-9 +ve, AFP -ve, HIV -ve, IgM 27  - CLD workup neg   -oxycodone 5mg q6hr added for pain control s/p biopsy     #dysuria  Pt complaints of burning with urination   -UA +ketones, protein, small LE, blood       #kanwal, HAGMA (resolved)  -suspect HAGMA in setting of KANWAL  cr 1.1 today  - kanwal likely pre-renal from dehydration     #HTN  -currently on no meds- BP WNL   - hold ACE in setting of KANWAL    #HLD  - hold statin due to elevated LFTs    #Nephrolithiasis   - notes passing kidney stones in past and had one recently     DVT proph: heparin   GI ppx - no ppi  Diet: regular, low fiber   discussed GOC: full code

## 2024-09-26 NOTE — PROGRESS NOTE ADULT - ATTENDING COMMENTS
Medicine Attending Addendum  Patient was seen and examined with medicine team.  Nursing records reviewed. I agree with the resident/PA/NP's note including past medical history, home medications, social history, allergies, surgical history, family history, and review of system. I have reviewed relevant vitals, laboratory values, imaging studies, and microbiology.   - Above resident's note was edited by me  - No acute complaints  - Liver biopsy reveals urothelial cancer in origin. Will recall oncology for chemo plan.  - rest of A/P as per detailed housestaff note above except above modifications    Total time spent to complete patient's bedside assessment, reviewed medical chart, discussed medical plan of care with team was more than 35 minutes with >50% of time spent face to face with patient, discussion with patient/family and/or coordination of care.

## 2024-09-26 NOTE — PROGRESS NOTE ADULT - SUBJECTIVE AND OBJECTIVE BOX
SUBJECTIVE/OVERNIGHT EVENTS  Today is hospital day 10d. This morning patient was seen and examined at bedside, resting comfortably in bed. No acute or major events overnight.    CODE STATUS:full code     FAMILY COMMUNICATION  Contact date:  Name of person contacted:  Relationship to patient:  Communication details:    MEDICATIONS  STANDING MEDICATIONS  chlorhexidine 2% Cloths 1 Application(s) Topical daily  heparin   Injectable 5000 Unit(s) SubCutaneous every 12 hours  lactated ringers. 1000 milliLiter(s) IV Continuous <Continuous>  lactobacillus acidophilus 1 Tablet(s) Oral daily  polyethylene glycol 3350 17 Gram(s) Oral every 24 hours  senna 2 Tablet(s) Oral at bedtime    PRN MEDICATIONS  acetaminophen     Tablet .. 650 milliGRAM(s) Oral every 6 hours PRN  loperamide 2 milliGRAM(s) Oral every 6 hours PRN  oxyCODONE    IR 5 milliGRAM(s) Oral every 6 hours PRN    VITALS  T(F): 98.3 (09-26-24 @ 04:54), Max: 98.9 (09-25-24 @ 20:28)  HR: 91 (09-26-24 @ 04:54) (80 - 96)  BP: 129/57 (09-26-24 @ 04:54) (109/68 - 129/57)  RR: 18 (09-26-24 @ 04:54) (18 - 18)  SpO2: 92% (09-26-24 @ 04:54) (92% - 98%)    PHYSICAL EXAM  GENERAL  ( x ) NAD, lying in bed comfortably     (  ) obtunded     (  ) lethargic     (  ) somnolent    HEAD  (  x) Atraumatic     (  ) hematoma     (  ) laceration (specify location:       )     NECK  (x  ) Supple     (  ) neck stiffness     (  ) nuchal rigidity     (  )  no JVD     (  ) JVD present ( -- cm)    HEART  Rate -->  (x  ) normal rate    (  ) bradycardic    (  ) tachycardic  Rhythm -->  ( x ) regular    (  ) regularly irregular    (  ) irregularly irregular  Murmurs -->  (  x) normal s1/s2    (  ) systolic murmur    (  ) diastolic murmur    (  ) continuous murmur     (  ) S3 present    (  ) S4 present    LUNGS  (x  )Unlabored respirations     (  ) tachypnea  (x  ) B/L air entry     (  ) decreased breath sounds in:  (location     )    (x  ) no adventitious sound     (  ) crackles     (  ) wheezing      (  ) rhonchi      (specify location:       )  (  ) chest wall tenderness (specify location:       )    ABDOMEN  (x  ) Soft     (  ) tense   |   ( ) nondistended     (x  ) distended   |   (  ) +BS     (  ) hypoactive bowel sounds     (  ) hyperactive bowel sounds  (x  ) nontender     (  ) RUQ tenderness     (  ) RLQ tenderness     (  ) LLQ tenderness     (  ) epigastric tenderness     (  ) diffuse tenderness  ( x ) Splenomegaly      (  ) Hepatomegaly      (  ) Jaundice     (  ) ecchymosis     EXTREMITIES  ( x ) Normal     (  ) Rash     (  ) ecchymosis     (  ) varicose veins      (  ) pitting edema     (  ) non-pitting edema   (  ) ulceration     (  ) gangrene:     (location:     )    NERVOUS SYSTEM  (x  ) A&Ox3     (  ) confused     (  ) lethargic  CN II-XII:     (  ) Intact     (  ) focal deficits  (Specify:     )   Upper extremities:     (  ) strength X/5     (  ) focal deficit (specify:    )  Lower extremities:     (  ) strength  X/5    (  ) focal deficit (specify:    )    SKIN  (x  ) No rashes or lesions     (  ) maculopapular rash     (  ) pustules     (  ) vesicles     (  ) ulcer     (  ) ecchymosis     (specify location:     )    (  ) Indwelling [place] Catheter   Date insterted:    Reason (  ) Critical illness     (  ) urinary retention    (  ) Accurate Ins/Outs Monitoring     (  ) CMO patient    (  ) Central Line  Date inserted:  Location: (  ) Right IJ   (  ) Left IJ   (  ) Right Fem   (  ) Left Fem    (  ) SPC  (  ) pigtail  (  ) PEG tube  (  ) colostomy  (  ) jejunostomy  (  ) U-Dall    LABS             11.4   11.66 )-----------( 304      ( 09-26-24 @ 08:04 )             31.9     143  |  106  |  21  -------------------------<  84   09-26-24 @ 08:04  3.8  |  20  |  1.0    Ca      9.4     09-26-24 @ 08:04  Mg     1.7     09-26-24 @ 08:04    TPro  5.0  /  Alb  2.9  /  TBili  4.3  /  DBili  x   /  AST  252  /  ALT  64  /  AlkPhos  889  /  GGT  x     09-26-24 @ 08:04        Urinalysis Basic - ( 26 Sep 2024 08:04 )    Color: x / Appearance: x / SG: x / pH: x  Gluc: 84 mg/dL / Ketone: x  / Bili: x / Urobili: x   Blood: x / Protein: x / Nitrite: x   Leuk Esterase: x / RBC: x / WBC x   Sq Epi: x / Non Sq Epi: x / Bacteria: x          Culture - Urine (collected 24 Sep 2024 11:20)  Source: Clean Catch Clean Catch (Midstream)  Final Report (25 Sep 2024 17:27):    <10,000 CFU/mL Normal Urogenital Larissa      IMAGING

## 2024-09-26 NOTE — PROGRESS NOTE ADULT - ASSESSMENT
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  Patient ate out on 9/6 and began to experience abdominal pain and diarrhea. Denies any vomiting but endorses nausea, notes having 30-40 loose stools over the past week and over the past day he's had 3-5 loose stools. No melena, no hematochezia, describes stools as watery and filled with mucus. No prior history of this in the past, last colonoscopy was 8 years ago and was normal per patient. Notes 8/10 epigastric and periumbilical pain. Decreased appetite due to pain and diarrhea. No fevers, no chills, no sick contacts, does not endorse eating anything out of the ordinary, does not recall his meal prior to the onset of symptoms. No cp, sob, no loc, no acute vision or hearing changes. No other ros symptoms reported.     #bladder mass  - Dense irregularity within the urinary bladder measuring up to 5.3 cm   - MR abdomen --> Enlarged heterogeneous liver with innumerable bilobar hepatic masses, suspicious for metastatic disease. Multiple subcentimeter and mildly enlarged upper abdominal lymph nodes, as detailed above, suspicious for lymph node involvement. Numerous foci of osseous metastasis, as detailed above. Partially imaged 3.9 cm enhancing lobulated soft tissue mass within the urinary bladder, suspicious for neoplasm.  concerning for an underlying lesion. No hydronephrosis.  -Pt s/p biopsy with IR 9/23      -f/u results      -f/u heme-onc  - urology evaluated - o/p f/u  -Pt to undergo treatment w/ heme-onc per family decision    #?Liver mets   #transaminitis  diarrhea (resolved)  -pt has history of fentanyl patch use - could be accounting for diarrhea with w/d diarrhea  -pt states two weeks when abdominal pain and diarrhea started, he had shrimp/seafood from [employer]  - CT a/p showing cirrhotic morphology - suspect metastasis from bladder tumor    - RUQ US: cirrhotic liver  - t bili 2.6 --> 2.1 --> 3.1--> 4.3  -  ALT 61  - lipase 81  - hepatitis panel negative  - GI PCR panel negative, C. Diff negative  - AFB ngtd, bcx ngtd, ua negative   - Not on antibx  - Hepatology consult noted  - CA 19-9 +ve, AFP -ve, HIV -ve, IgM 27  - CLD workup neg   -oxycodone 5mg q6hr added for pain control s/p biopsy     #dysuria  Pt complaints of burning with urination   -UA +ketones, protein, small LE, blood       #kanwal, HAGMA (resolved)  -suspect HAGMA in setting of KANWAL  cr 1.1 today  - kanwal likely pre-renal from dehydration     #HTN  -currently on no meds- BP WNL   - hold ACE in setting of KANWAL    #HLD  - hold statin due to elevated LFTs    #Nephrolithiasis   - notes passing kidney stones in past and had one recently     DVT proph: heparin   GI ppx - no ppi  Diet: regular, low fiber   discussed GOC: full code

## 2024-09-27 NOTE — PROGRESS NOTE ADULT - ASSESSMENT
Pt is a 79y M with newly dx metastatic urothelial ca;  recalled today for hematuria, inability to irrigate catheter.     ·	14Fr catheter in place with bloody output noted in tubing, deflated balloon and attempted to advance catheter. Catheter removed, attempted to place 18Fr coude catheter however unable to advance to hub.   ·	Monitor without arcos catheter for now   ·	Cont with bladder scan, if retaining will reassess for arcos placement    ·	Will d/w attng

## 2024-09-27 NOTE — PROGRESS NOTE ADULT - SUBJECTIVE AND OBJECTIVE BOX
SUBJECTIVE/OVERNIGHT EVENTS  Today is hospital day 11d. This morning patient was seen and examined at bedside, resting comfortably in bed. No acute or major events overnight.    Pt complained of urinary frequency w/ low urine output when trying to urinate. Flomax started    Pt has been tachycardic this AM. Upon RN seeing the pt after me, the pt was satting at 90% on RA. NC started. EKG shows sinus tach. Will obtain d-dimer and assess need for CT to r/o PE       CODE STATUS: full code     FAMILY COMMUNICATION  Contact date:  Name of person contacted:  Relationship to patient:  Communication details:    MEDICATIONS  STANDING MEDICATIONS  chlorhexidine 2% Cloths 1 Application(s) Topical daily  heparin   Injectable 5000 Unit(s) SubCutaneous every 12 hours  lactated ringers. 1000 milliLiter(s) IV Continuous <Continuous>  lactated ringers. 1000 milliLiter(s) IV Continuous <Continuous>  lactobacillus acidophilus 1 Tablet(s) Oral daily  polyethylene glycol 3350 17 Gram(s) Oral every 24 hours  senna 2 Tablet(s) Oral at bedtime  tamsulosin 0.4 milliGRAM(s) Oral daily    PRN MEDICATIONS  acetaminophen     Tablet .. 650 milliGRAM(s) Oral every 6 hours PRN  loperamide 2 milliGRAM(s) Oral every 6 hours PRN  oxyCODONE    IR 5 milliGRAM(s) Oral every 6 hours PRN    VITALS  T(F): 97.8 (24 @ 04:29), Max: 98.9 (24 @ 14:39)  HR: 99 (24 @ 07:49) (68 - 114)  BP: 123/69 (24 @ 04:29) (114/81 - 123/69)  RR: 17 (24 @ 04:29) (17 - 18)  SpO2: 91% (24 @ 07:49) (91% - 97%)    PHYSICAL EXAM  GENERAL  ( x ) NAD, lying in bed comfortably     (  ) obtunded     (  ) lethargic     (  ) somnolent    HEAD  (  x) Atraumatic     (  ) hematoma     (  ) laceration (specify location:       )     NECK  ( x ) Supple     (  ) neck stiffness     (  ) nuchal rigidity     (  )  no JVD     (  ) JVD present ( -- cm)    HEART  Rate -->  (x  ) normal rate    (  ) bradycardic    (  ) tachycardic  Rhythm -->  ( x ) regular    (  ) regularly irregular    (  ) irregularly irregular  Murmurs -->  (x  ) normal s1/s2    (  ) systolic murmur    (  ) diastolic murmur    (  ) continuous murmur     (  ) S3 present    (  ) S4 present    LUNGS  ( x )Unlabored respirations     (  ) tachypnea  (  x) B/L air entry     (  ) decreased breath sounds in:  (location     )    (x  ) no adventitious sound     (  ) crackles     (  ) wheezing      (  ) rhonchi      (specify location:       )  (  ) chest wall tenderness (specify location:       )    ABDOMEN  x(  ) Soft     (  ) tense   |   (  ) nondistended     (x  ) distended   |   (  ) +BS     (  ) hypoactive bowel sounds     (  ) hyperactive bowel sounds  (  ) nontender     (  ) RUQ tenderness     (x  ) RLQ tenderness     (  ) LLQ tenderness     (  ) epigastric tenderness     (  ) diffuse tenderness  (  ) Splenomegaly      (x  ) Hepatomegaly      (  ) Jaundice     (  ) ecchymosis     EXTREMITIES  x(  ) Normal     (  ) Rash     (  ) ecchymosis     (  ) varicose veins      (  ) pitting edema     (  ) non-pitting edema   (  ) ulceration     (  ) gangrene:     (location:     )    NERVOUS SYSTEM  ( x ) A&Ox3     (  ) confused     (  ) lethargic  CN II-XII:     (  ) Intact     (  ) focal deficits  (Specify:     )   Upper extremities:     (  ) strength X/5     (  ) focal deficit (specify:    )  Lower extremities:     (  ) strength  X/5    (  ) focal deficit (specify:    )    SKIN  ( x ) No rashes or lesions     (  ) maculopapular rash     (  ) pustules     (  ) vesicles     (  ) ulcer     (  ) ecchymosis     (specify location:     )    (  ) Indwelling [place] Catheter   Date insterted:    Reason (  ) Critical illness     (  ) urinary retention    (  ) Accurate Ins/Outs Monitoring     (  ) CMO patient    (  ) Central Line  Date inserted:  Location: (  ) Right IJ   (  ) Left IJ   (  ) Right Fem   (  ) Left Fem    (  ) SPC  (  ) pigtail  (  ) PEG tube  (  ) colostomy  (  ) jejunostomy  (  ) U-Dall    LABS             11.2   10.72 )-----------( 302      ( 24 @ 07:22 )             31.4     143  |  104  |  19  -------------------------<  87   24 @ 07:22  4.1  |  20  |  1.0    Ca      9.4     24 @ 07:22  Mg     2.2     24 @ 07:22    TPro  5.2  /  Alb  3.0  /  TBili  5.5  /  DBili  x   /  AST  283  /  ALT  72  /  AlkPhos  969  /  GGT  x     24 @ 07:22        Urinalysis Basic - ( 27 Sep 2024 10:30 )    Color: Orange / Appearance: Turbid / S.016 / pH: x  Gluc: x / Ketone: Negative mg/dL  / Bili: Moderate / Urobili: 0.2 mg/dL   Blood: x / Protein: 100 mg/dL / Nitrite: Positive   Leuk Esterase: Moderate / RBC: x / WBC x   Sq Epi: x / Non Sq Epi: x / Bacteria: x          Culture - Urine (collected 24 Sep 2024 11:20)  Source: Clean Catch Clean Catch (Midstream)  Final Report (25 Sep 2024 17:27):    <10,000 CFU/mL Normal Urogenital Larissa      IMAGING SUBJECTIVE/OVERNIGHT EVENTS  Today is hospital day 11d. This morning patient was seen and examined at bedside, resting comfortably in bed. No acute or major events overnight.    Pt complained of urinary frequency w/ low urine output when trying to urinate. Flomax started    Pt has been tachycardic this AM. Upon RN seeing the pt after me, the pt was satting at 90% on RA. NC started. EKG shows sinus tach. Will obtain d-dimer and assess need for CT to r/o PE     Pt noted to have delfina hematuria in afternoon. Previous MRI Abd neg for hydro. Obtaining uro consult       CODE STATUS: full code     FAMILY COMMUNICATION  Contact date:  Name of person contacted:  Relationship to patient:  Communication details:    MEDICATIONS  STANDING MEDICATIONS  chlorhexidine 2% Cloths 1 Application(s) Topical daily  heparin   Injectable 5000 Unit(s) SubCutaneous every 12 hours  lactated ringers. 1000 milliLiter(s) IV Continuous <Continuous>  lactated ringers. 1000 milliLiter(s) IV Continuous <Continuous>  lactobacillus acidophilus 1 Tablet(s) Oral daily  polyethylene glycol 3350 17 Gram(s) Oral every 24 hours  senna 2 Tablet(s) Oral at bedtime  tamsulosin 0.4 milliGRAM(s) Oral daily    PRN MEDICATIONS  acetaminophen     Tablet .. 650 milliGRAM(s) Oral every 6 hours PRN  loperamide 2 milliGRAM(s) Oral every 6 hours PRN  oxyCODONE    IR 5 milliGRAM(s) Oral every 6 hours PRN    VITALS  T(F): 97.8 (24 @ 04:29), Max: 98.9 (24 @ 14:39)  HR: 99 (24 @ 07:49) (68 - 114)  BP: 123/69 (24 @ 04:29) (114/81 - 123/69)  RR: 17 (24 @ 04:29) (17 - 18)  SpO2: 91% (24 @ 07:49) (91% - 97%)    PHYSICAL EXAM  GENERAL  ( x ) NAD, lying in bed comfortably     (  ) obtunded     (  ) lethargic     (  ) somnolent    HEAD  (  x) Atraumatic     (  ) hematoma     (  ) laceration (specify location:       )     NECK  ( x ) Supple     (  ) neck stiffness     (  ) nuchal rigidity     (  )  no JVD     (  ) JVD present ( -- cm)    HEART  Rate -->  (x  ) normal rate    (  ) bradycardic    (  ) tachycardic  Rhythm -->  ( x ) regular    (  ) regularly irregular    (  ) irregularly irregular  Murmurs -->  (x  ) normal s1/s2    (  ) systolic murmur    (  ) diastolic murmur    (  ) continuous murmur     (  ) S3 present    (  ) S4 present    LUNGS  ( x )Unlabored respirations     (  ) tachypnea  (  x) B/L air entry     (  ) decreased breath sounds in:  (location     )    (x  ) no adventitious sound     (  ) crackles     (  ) wheezing      (  ) rhonchi      (specify location:       )  (  ) chest wall tenderness (specify location:       )    ABDOMEN  x(  ) Soft     (  ) tense   |   (  ) nondistended     (x  ) distended   |   (  ) +BS     (  ) hypoactive bowel sounds     (  ) hyperactive bowel sounds  (  ) nontender     (  ) RUQ tenderness     (x  ) RLQ tenderness     (  ) LLQ tenderness     (  ) epigastric tenderness     (  ) diffuse tenderness  (  ) Splenomegaly      (x  ) Hepatomegaly      (  ) Jaundice     (  ) ecchymosis     EXTREMITIES  x(  ) Normal     (  ) Rash     (  ) ecchymosis     (  ) varicose veins      (  ) pitting edema     (  ) non-pitting edema   (  ) ulceration     (  ) gangrene:     (location:     )    NERVOUS SYSTEM  ( x ) A&Ox3     (  ) confused     (  ) lethargic  CN II-XII:     (  ) Intact     (  ) focal deficits  (Specify:     )   Upper extremities:     (  ) strength X/5     (  ) focal deficit (specify:    )  Lower extremities:     (  ) strength  X/5    (  ) focal deficit (specify:    )    SKIN  ( x ) No rashes or lesions     (  ) maculopapular rash     (  ) pustules     (  ) vesicles     (  ) ulcer     (  ) ecchymosis     (specify location:     )    (  ) Indwelling [place] Catheter   Date insterted:    Reason (  ) Critical illness     (  ) urinary retention    (  ) Accurate Ins/Outs Monitoring     (  ) CMO patient    (  ) Central Line  Date inserted:  Location: (  ) Right IJ   (  ) Left IJ   (  ) Right Fem   (  ) Left Fem    (  ) SPC  (  ) pigtail  (  ) PEG tube  (  ) colostomy  (  ) jejunostomy  (  ) U-Dall    LABS             11.2   10.72 )-----------( 302      ( 24 @ 07:22 )             31.4     143  |  104  |  19  -------------------------<  87   24 @ 07:22  4.1  |  20  |  1.0    Ca      9.4     24 @ 07:22  Mg     2.2     24 @ 07:22    TPro  5.2  /  Alb  3.0  /  TBili  5.5  /  DBili  x   /  AST  283  /  ALT  72  /  AlkPhos  969  /  GGT  x     24 @ 07:22        Urinalysis Basic - ( 27 Sep 2024 10:30 )    Color: Orange / Appearance: Turbid / S.016 / pH: x  Gluc: x / Ketone: Negative mg/dL  / Bili: Moderate / Urobili: 0.2 mg/dL   Blood: x / Protein: 100 mg/dL / Nitrite: Positive   Leuk Esterase: Moderate / RBC: x / WBC x   Sq Epi: x / Non Sq Epi: x / Bacteria: x          Culture - Urine (collected 24 Sep 2024 11:20)  Source: Clean Catch Clean Catch (Midstream)  Final Report (25 Sep 2024 17:27):    <10,000 CFU/mL Normal Urogenital Larissa      IMAGING

## 2024-09-27 NOTE — PROGRESS NOTE ADULT - SUBJECTIVE AND OBJECTIVE BOX
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 Pt seen at bedside and case d/w covering RN. Pt was straight catheterized earlier today with 200cc mary colored output initially and blood tinged output prior to catheter being removed. Given hematuria on straight cath an indwelling arcos catheter was placed. Per RN, attempted to irrigate catheter however was unable to flush catheter and  was called for further evaluation.       REVIEW OF SYSTEMS   [x] A ten-point review of systems was otherwise negative except as noted.      Allergies  No Known Allergies      MEDICATIONS:  acetaminophen     Tablet .. 650 milliGRAM(s) Oral every 6 hours PRN  chlorhexidine 2% Cloths 1 Application(s) Topical daily  heparin   Injectable 5000 Unit(s) SubCutaneous every 12 hours  lactated ringers. 1000 milliLiter(s) IV Continuous <Continuous>  lactated ringers. 1000 milliLiter(s) IV Continuous <Continuous>  lactobacillus acidophilus 1 Tablet(s) Oral daily  loperamide 2 milliGRAM(s) Oral every 6 hours PRN  oxyCODONE    IR 5 milliGRAM(s) Oral every 6 hours PRN  polyethylene glycol 3350 17 Gram(s) Oral every 24 hours  senna 2 Tablet(s) Oral at bedtime  tamsulosin 0.4 milliGRAM(s) Oral daily      Vital Signs Last 24 Hrs  T(C): 37 (27 Sep 2024 12:15), Max: 37 (27 Sep 2024 12:15)  T(F): 98.6 (27 Sep 2024 12:15), Max: 98.6 (27 Sep 2024 12:15)  HR: 103 (27 Sep 2024 12:15) (68 - 114)  BP: 112/52 (27 Sep 2024 12:15) (112/52 - 123/69)  RR: 18 (27 Sep 2024 12:15) (17 - 18)  SpO2: 97% (27 Sep 2024 12:15) (91% - 97%)    Parameters below as of 27 Sep 2024 07:49  Patient On (Oxygen Delivery Method): room air      09-26 @ 07:01  -  09-27 @ 07:00  --------------------------------------------------------  IN:  Total IN: 0 mL    OUT:    Voided (mL): 800 mL  Total OUT: 800 mL    Total NET: -800 mL      09-27 @ 07:01  -  09-27 @ 15:01  --------------------------------------------------------  IN:  Total IN: 0 mL    OUT:    Intermittent Catheterization - Urethral (mL): 200 mL  Total OUT: 200 mL    Total NET: -200 mL      PHYSICAL EXAM:  GEN: NAD, awake and alert  SKIN: Good color, non diaphoretic  RESP: Non-labored breathing  ABDO: Soft, NT  : Uncircumcised male; pt with 14Fr catheter in place with bloody output noted in tubing, deflated balloon and attempted to advance catheter. Catheter removed, attempted to place 18Fr coude catheter however unable to advance to hub.     LABS:  CBC-                        11.2   10.72 )-----------( 302      ( 27 Sep 2024 07:22 )             31.4     BMP/CMP-  27 Sep 2024 07:22    143    |  104    |  19     ----------------------------<  87     4.1     |  20     |  1.0      Ca    9.4        27 Sep 2024 07:22  Mg     2.2       27 Sep 2024 07:22    TPro  5.2    /  Alb  3.0    /  TBili  5.5    /  DBili  x      /  AST  283    /  ALT  72     /  AlkPhos  969    27 Sep 2024 07:22    Endocrine Panel-  Calcium: 9.4 mg/dL (09-27 @ 07:22)

## 2024-09-27 NOTE — PROGRESS NOTE ADULT - ASSESSMENT
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  Patient ate out on 9/6 and began to experience abdominal pain and diarrhea. Denies any vomiting but endorses nausea, notes having 30-40 loose stools over the past week and over the past day he's had 3-5 loose stools. No melena, no hematochezia, describes stools as watery and filled with mucus. No prior history of this in the past, last colonoscopy was 8 years ago and was normal per patient. Notes 8/10 epigastric and periumbilical pain. Decreased appetite due to pain and diarrhea. No fevers, no chills, no sick contacts, does not endorse eating anything out of the ordinary, does not recall his meal prior to the onset of symptoms. No cp, sob, no loc, no acute vision or hearing changes. No other ros symptoms reported.     #bladder mass  - Dense irregularity within the urinary bladder measuring up to 5.3 cm   - MR abdomen --> Enlarged heterogeneous liver with innumerable bilobar hepatic masses, suspicious for metastatic disease. Multiple subcentimeter and mildly enlarged upper abdominal lymph nodes, as detailed above, suspicious for lymph node involvement. Numerous foci of osseous metastasis, as detailed above. Partially imaged 3.9 cm enhancing lobulated soft tissue mass within the urinary bladder, suspicious for neoplasm.  concerning for an underlying lesion. No hydronephrosis.  -Pt s/p biopsy with IR 9/23: remarkable for cytopathology consistent w/ bladder cancer      -Heme-onc aware. F/u recs   - urology evaluated - o/p f/u  -Pt to undergo treatment w/ heme-onc per family decision    #?Liver mets   #transaminitis  diarrhea (resolved)  -pt has history of fentanyl patch use - could be accounting for diarrhea with w/d diarrhea  -pt states two weeks when abdominal pain and diarrhea started, he had shrimp/seafood from [employer]  - CT a/p showing cirrhotic morphology - suspect metastasis from bladder tumor    - RUQ US: cirrhotic liver  - t bili 2.6 --> 2.1 --> 3.1--> 4.3  -  ALT 61  - lipase 81  - hepatitis panel negative  - GI PCR panel negative, C. Diff negative  - AFB ngtd, bcx ngtd, ua negative   - Not on antibx  - Hepatology consult noted  - CA 19-9 +ve, AFP -ve, HIV -ve, IgM 27  - CLD workup neg   -oxycodone 5mg q6hr added for pain control s/p biopsy     #tachypnea   -pt was tachycardic this AM  -EKG obtained: showed sinus tach  -pt desaturated to 90% on RA. NC started   -f/u d-dimer     #dysuria  Pt complaints of burning with urination   -UA +ketones, protein, small LE, blood - unchanged from prior    #urinary urgency  -has frequent urge to urinate but unable to urinate large volume  -c/w flomax     #kanwal, HAGMA (resolved)  -suspect HAGMA in setting of KANWAL  cr 1.1 today  - kanwal likely pre-renal from dehydration     #HTN  -currently on no meds- BP WNL   - hold ACE in setting of KANWAL    #HLD  - hold statin due to elevated LFTs    #Nephrolithiasis   - notes passing kidney stones in past and had one recently     DVT proph: heparin   GI ppx - no ppi  Diet: regular, low fiber   discussed GOC: full code  The patient is a 79 y/o male w/pmhx of HTN, HLD, 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  Patient ate out on 9/6 and began to experience abdominal pain and diarrhea. Denies any vomiting but endorses nausea, notes having 30-40 loose stools over the past week and over the past day he's had 3-5 loose stools. No melena, no hematochezia, describes stools as watery and filled with mucus. No prior history of this in the past, last colonoscopy was 8 years ago and was normal per patient. Notes 8/10 epigastric and periumbilical pain. Decreased appetite due to pain and diarrhea. No fevers, no chills, no sick contacts, does not endorse eating anything out of the ordinary, does not recall his meal prior to the onset of symptoms. No cp, sob, no loc, no acute vision or hearing changes. No other ros symptoms reported.     #bladder mass  - Dense irregularity within the urinary bladder measuring up to 5.3 cm   - MR abdomen --> Enlarged heterogeneous liver with innumerable bilobar hepatic masses, suspicious for metastatic disease. Multiple subcentimeter and mildly enlarged upper abdominal lymph nodes, as detailed above, suspicious for lymph node involvement. Numerous foci of osseous metastasis, as detailed above. Partially imaged 3.9 cm enhancing lobulated soft tissue mass within the urinary bladder, suspicious for neoplasm.  concerning for an underlying lesion. No hydronephrosis.  -Pt s/p biopsy with IR 9/23: remarkable for cytopathology consistent w/ bladder cancer      -Heme-onc aware. F/u recs   - urology evaluated - o/p f/u  -Pt to undergo treatment w/ heme-onc per family decision    #?Liver mets   #transaminitis  diarrhea (resolved)  -pt has history of fentanyl patch use - could be accounting for diarrhea with w/d diarrhea  -pt states two weeks when abdominal pain and diarrhea started, he had shrimp/seafood from [employer]  - CT a/p showing cirrhotic morphology - suspect metastasis from bladder tumor    - RUQ US: cirrhotic liver  - t bili 2.6 --> 2.1 --> 3.1--> 4.3  -  ALT 61  - lipase 81  - hepatitis panel negative  - GI PCR panel negative, C. Diff negative  - AFB ngtd, bcx ngtd, ua negative   - Not on antibx  - Hepatology consult noted  - CA 19-9 +ve, AFP -ve, HIV -ve, IgM 27  - CLD workup neg   -oxycodone 5mg q6hr added for pain control s/p biopsy     #tachypnea   -pt was tachycardic this AM  -EKG obtained: showed sinus tach  -pt desaturated to 90% on RA. NC started   -f/u d-dimer     #dysuria  Pt complaints of burning with urination   -UA +ketones, protein, small LE, blood - unchanged from prior    #urinary urgency  -has frequent urge to urinate but unable to urinate large volume  -c/w flomax     #hematuria  -MRI Abd 9/19 neg for hydro  -f/u uro consult     #kanwal, HAGMA (resolved)  -suspect HAGMA in setting of KANWAL  cr 1.1 today  - kanwal likely pre-renal from dehydration     #HTN  -currently on no meds- BP WNL   - hold ACE in setting of KANWAL    #HLD  - hold statin due to elevated LFTs    #Nephrolithiasis   - notes passing kidney stones in past and had one recently     DVT proph: heparin   GI ppx - no ppi  Diet: regular, low fiber   discussed GOC: full code

## 2024-09-27 NOTE — CHART NOTE - NSCHARTNOTEFT_GEN_A_CORE
I was initially called by radiology for findings of left lower lobe segmental PE with no right heart strain. Patient was asymptomatic, saturating well on RA, HD stable. Hb has been stable for the past few days even though patient was reporting some hematuria. CBC and PTT sent and patient was started on heparin drip (no bolus). Was subsequently contacted by radiology attending who reported that the findings could be from an artifact, but cannot definitely say it is not a PE. He also commented that it is subsegmental. Heparin was discontinued.

## 2024-09-27 NOTE — PROGRESS NOTE ADULT - SUBJECTIVE AND OBJECTIVE BOX
NATHANAEL MCCLENDON 79y Male  MRN#: [id number]   Hospital Day: 11d    SUBJECTIVE  Patient is a 79y old Male who presents with a chief complaint of diarrhea (26 Sep 2024 10:10)  Currently admitted to medicine with the primary diagnosis of Abdominal pain      INTERVAL HPI AND OVERNIGHT EVENTS:  Patient was examined and seen at bedside.       REVIEW OF SYMPTOMS:      OBJECTIVE  PAST MEDICAL & SURGICAL HISTORY  HTN (hypertension)    HLD (hyperlipidemia)    Kidney stone    No significant past surgical history      ALLERGIES:  No Known Allergies    MEDICATIONS:  STANDING MEDICATIONS  chlorhexidine 2% Cloths 1 Application(s) Topical daily  heparin   Injectable 5000 Unit(s) SubCutaneous every 12 hours  lactated ringers. 1000 milliLiter(s) IV Continuous <Continuous>  lactobacillus acidophilus 1 Tablet(s) Oral daily  polyethylene glycol 3350 17 Gram(s) Oral every 24 hours  senna 2 Tablet(s) Oral at bedtime  tamsulosin 0.4 milliGRAM(s) Oral daily    PRN MEDICATIONS  acetaminophen     Tablet .. 650 milliGRAM(s) Oral every 6 hours PRN  loperamide 2 milliGRAM(s) Oral every 6 hours PRN  oxyCODONE    IR 5 milliGRAM(s) Oral every 6 hours PRN      VITAL SIGNS: Last 24 Hours  T(C): 36.6 (27 Sep 2024 04:29), Max: 37.2 (26 Sep 2024 14:39)  T(F): 97.8 (27 Sep 2024 04:29), Max: 98.9 (26 Sep 2024 14:39)  HR: 99 (27 Sep 2024 07:49) (68 - 114)  BP: 123/69 (27 Sep 2024 04:29) (114/81 - 123/69)  BP(mean): --  RR: 17 (27 Sep 2024 04:29) (17 - 18)  SpO2: 91% (27 Sep 2024 07:49) (91% - 97%)    LABS:                        11.2   10.72 )-----------( 302      ( 27 Sep 2024 07:22 )             31.4     09-27    143  |  104  |  19  ----------------------------<  87  4.1   |  20  |  1.0    Ca    9.4      27 Sep 2024 07:22  Mg     2.2     09-27    TPro  5.2[L]  /  Alb  3.0[L]  /  TBili  5.5[H]  /  DBili  x   /  AST  283[H]  /  ALT  72[H]  /  AlkPhos  969[H]  09-27      Urinalysis Basic - ( 27 Sep 2024 07:22 )    Color: x / Appearance: x / SG: x / pH: x  Gluc: 87 mg/dL / Ketone: x  / Bili: x / Urobili: x   Blood: x / Protein: x / Nitrite: x   Leuk Esterase: x / RBC: x / WBC x   Sq Epi: x / Non Sq Epi: x / Bacteria: x            Culture - Urine (collected 24 Sep 2024 11:20)  Source: Clean Catch Clean Catch (Midstream)  Final Report (25 Sep 2024 17:27):    <10,000 CFU/mL Normal Urogenital Larissa          RADIOLOGY:      PHYSICAL EXAM:  CONSTITUTIONAL: No acute distress, well-developed, well-groomed, AAOx3  HEAD: Atraumatic, normocephalic  EYES: EOM intact, PERRLA, conjunctiva and sclera clear  ENT: Supple, no masses, no thyromegaly, no bruits, no JVD; moist mucous membranes  PULMONARY: Clear to auscultation bilaterally; no wheezes, rales, or rhonchi  CARDIOVASCULAR: Regular rate and rhythm; no murmurs, rubs, or gallops  GASTROINTESTINAL: Soft, non-tender, non-distended; bowel sounds present  MUSCULOSKELETAL: 2+ peripheral pulses; no clubbing, no cyanosis, no edema  NEUROLOGY: non-focal  SKIN: No rashes or lesions; warm and dry     NATHANAEL MCCLENDON 79y Male  MRN#: [id number]   Hospital Day: 11d    SUBJECTIVE  Patient is a 79y old Male who presents with a chief complaint of diarrhea (26 Sep 2024 10:10)  Currently admitted to medicine with the primary diagnosis of Abdominal pain      INTERVAL HPI AND OVERNIGHT EVENTS:  Patient was examined and seen at bedside.       REVIEW OF SYMPTOMS:  Having difficulty voiding with bloody urine      OBJECTIVE  PAST MEDICAL & SURGICAL HISTORY  HTN (hypertension)    HLD (hyperlipidemia)    Kidney stone    No significant past surgical history      ALLERGIES:  No Known Allergies    MEDICATIONS:  STANDING MEDICATIONS  chlorhexidine 2% Cloths 1 Application(s) Topical daily  heparin   Injectable 5000 Unit(s) SubCutaneous every 12 hours  lactated ringers. 1000 milliLiter(s) IV Continuous <Continuous>  lactobacillus acidophilus 1 Tablet(s) Oral daily  polyethylene glycol 3350 17 Gram(s) Oral every 24 hours  senna 2 Tablet(s) Oral at bedtime  tamsulosin 0.4 milliGRAM(s) Oral daily    PRN MEDICATIONS  acetaminophen     Tablet .. 650 milliGRAM(s) Oral every 6 hours PRN  loperamide 2 milliGRAM(s) Oral every 6 hours PRN  oxyCODONE    IR 5 milliGRAM(s) Oral every 6 hours PRN      VITAL SIGNS: Last 24 Hours  T(C): 36.6 (27 Sep 2024 04:29), Max: 37.2 (26 Sep 2024 14:39)  T(F): 97.8 (27 Sep 2024 04:29), Max: 98.9 (26 Sep 2024 14:39)  HR: 99 (27 Sep 2024 07:49) (68 - 114)  BP: 123/69 (27 Sep 2024 04:29) (114/81 - 123/69)  BP(mean): --  RR: 17 (27 Sep 2024 04:29) (17 - 18)  SpO2: 91% (27 Sep 2024 07:49) (91% - 97%)    LABS:                        11.2   10.72 )-----------( 302      ( 27 Sep 2024 07:22 )             31.4     09-27    143  |  104  |  19  ----------------------------<  87  4.1   |  20  |  1.0    Ca    9.4      27 Sep 2024 07:22  Mg     2.2     09-27    TPro  5.2[L]  /  Alb  3.0[L]  /  TBili  5.5[H]  /  DBili  x   /  AST  283[H]  /  ALT  72[H]  /  AlkPhos  969[H]  09-27      Urinalysis Basic - ( 27 Sep 2024 07:22 )    Color: x / Appearance: x / SG: x / pH: x  Gluc: 87 mg/dL / Ketone: x  / Bili: x / Urobili: x   Blood: x / Protein: x / Nitrite: x   Leuk Esterase: x / RBC: x / WBC x   Sq Epi: x / Non Sq Epi: x / Bacteria: x            Culture - Urine (collected 24 Sep 2024 11:20)  Source: Clean Catch Clean Catch (Midstream)  Final Report (25 Sep 2024 17:27):    <10,000 CFU/mL Normal Urogenital Larissa          RADIOLOGY:      PHYSICAL EXAM:  CONSTITUTIONAL: No acute distress, well-developed, well-groomed, AAOx3  HEAD: Atraumatic, normocephalic  EYES: EOM intact, PERRLA, conjunctiva and sclera clear  ENT: Supple, no masses, no thyromegaly, no bruits, no JVD; moist mucous membranes  PULMONARY: Clear to auscultation bilaterally; no wheezes, rales, or rhonchi  CARDIOVASCULAR: Regular rate and rhythm; no murmurs, rubs, or gallops  GASTROINTESTINAL: Soft mass palpable in pubic region, likely ? distended bladder  MUSCULOSKELETAL: 2+ peripheral pulses; no clubbing, no cyanosis, no edema  NEUROLOGY: non-focal  SKIN: No rashes or lesions; warm and dry     NATHANAEL MCCLENDON 79y Male  MRN#: [id number]   Hospital Day: 11d    SUBJECTIVE  Patient is a 79y old Male who presents with a chief complaint of diarrhea (26 Sep 2024 10:10)  Currently admitted to medicine with the primary diagnosis of Abdominal pain      INTERVAL HPI AND OVERNIGHT EVENTS:  Patient was examined and seen at bedside.       REVIEW OF SYMPTOMS:  Having difficulty voiding with bloody urine      OBJECTIVE  PAST MEDICAL & SURGICAL HISTORY  HTN (hypertension)    HLD (hyperlipidemia)    Kidney stone    No significant past surgical history      ALLERGIES:  No Known Allergies    MEDICATIONS:  STANDING MEDICATIONS  chlorhexidine 2% Cloths 1 Application(s) Topical daily  heparin   Injectable 5000 Unit(s) SubCutaneous every 12 hours  lactated ringers. 1000 milliLiter(s) IV Continuous <Continuous>  lactobacillus acidophilus 1 Tablet(s) Oral daily  polyethylene glycol 3350 17 Gram(s) Oral every 24 hours  senna 2 Tablet(s) Oral at bedtime  tamsulosin 0.4 milliGRAM(s) Oral daily    PRN MEDICATIONS  acetaminophen     Tablet .. 650 milliGRAM(s) Oral every 6 hours PRN  loperamide 2 milliGRAM(s) Oral every 6 hours PRN  oxyCODONE    IR 5 milliGRAM(s) Oral every 6 hours PRN      VITAL SIGNS: Last 24 Hours  T(C): 36.6 (27 Sep 2024 04:29), Max: 37.2 (26 Sep 2024 14:39)  T(F): 97.8 (27 Sep 2024 04:29), Max: 98.9 (26 Sep 2024 14:39)  HR: 99 (27 Sep 2024 07:49) (68 - 114)  BP: 123/69 (27 Sep 2024 04:29) (114/81 - 123/69)  BP(mean): --  RR: 17 (27 Sep 2024 04:29) (17 - 18)  SpO2: 91% (27 Sep 2024 07:49) (91% - 97%)    LABS:                        11.2   10.72 )-----------( 302      ( 27 Sep 2024 07:22 )             31.4     09-27    143  |  104  |  19  ----------------------------<  87  4.1   |  20  |  1.0    Ca    9.4      27 Sep 2024 07:22  Mg     2.2     09-27    TPro  5.2[L]  /  Alb  3.0[L]  /  TBili  5.5[H]  /  DBili  x   /  AST  283[H]  /  ALT  72[H]  /  AlkPhos  969[H]  09-27      Urinalysis Basic - ( 27 Sep 2024 07:22 )    Color: x / Appearance: x / SG: x / pH: x  Gluc: 87 mg/dL / Ketone: x  / Bili: x / Urobili: x   Blood: x / Protein: x / Nitrite: x   Leuk Esterase: x / RBC: x / WBC x   Sq Epi: x / Non Sq Epi: x / Bacteria: x            Culture - Urine (collected 24 Sep 2024 11:20)  Source: Clean Catch Clean Catch (Midstream)  Final Report (25 Sep 2024 17:27):    <10,000 CFU/mL Normal Urogenital Larissa          RADIOLOGY:      PHYSICAL EXAM:  CONSTITUTIONAL: No acute distress, well-developed, well-groomed, AAOx3  HEAD: Atraumatic, normocephalic  EYES: EOM intact, PERRLA, conjunctiva and sclera clear  ENT: Supple, no masses, no thyromegaly, no bruits, no JVD; moist mucous membranes  PULMONARY: Clear to auscultation bilaterally; no wheezes, rales, or rhonchi  CARDIOVASCULAR: Regular rate and rhythm; no murmurs, rubs, or gallops  GASTROINTESTINAL: Soft mass palpable in pubic region, likely ?distended bladder  MUSCULOSKELETAL: 2+ peripheral pulses; no clubbing, no cyanosis, no edema  NEUROLOGY: non-focal  SKIN: No rashes or lesions; warm and dry

## 2024-09-27 NOTE — PROGRESS NOTE ADULT - ATTENDING COMMENTS
Medicine Attending Addendum  Patient was seen and examined with medicine team.  Nursing records reviewed. I agree with the resident/PA/NP's note including past medical history, home medications, social history, allergies, surgical history, family history, and review of system. I have reviewed relevant vitals, laboratory values, imaging studies, and microbiology.   - Above resident's note was edited by me  - Khari hematuria in setting of bladder cancer. Initiate arcos for flushing. Oncology consulted. Oncology on the case  - rest of A/P as per detailed housestaff note above except above modifications    Total time spent to complete patient's bedside assessment, reviewed medical chart, discussed medical plan of care with team was more than 35 minutes with >50% of time spent face to face with patient, discussion with patient/family and/or coordination of care.

## 2024-09-27 NOTE — PROGRESS NOTE ADULT - ASSESSMENT
The patient is a 77 y/o male w/pmhx of HTN, HLD, hx of frequent nephrothiliasis and recent passage of a stone a few days ago w/trace hematuria, cervical neck arthritis previous use of opioid pain medication, presenting for 10 days of diarrhea, abdominal pain.Notes having 30-40 loose stools over the past week and over the past day he's had 3-5 loose stools.     #Metastatic high grade urothelial cancer  #Innumerable bilobar hepatic masses,   #mildly enlarged upper abdominal lymph nodes  #foci of osseous metastasis    -Presented with c/o diarrhea   -CT A/p IMPRESSION: Dense irregularity within the urinary bladder measuring up to 5.3 cm   concerning for an underlying lesion. No hydronephrosis. Prominent retroperitoneal lymph nodes measuring up to 1 cm. Cirrhotic morphology of the liver. Hepatic steatosis. 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   49 , AFP 2.4,   Evaluated by urology, Pt. will need to F/U with Dr. Caldwell for further  management of bladder mass.  MR 9.24: Enlarged heterogeneous liver with innumerable bilobar hepatic masses,   suspicious for metastatic disease. Multiple subcentimeter and mildly enlarged upper abdominal lymph nodes, as detailed above, suspicious for lymph node involvement. Numerous foci of osseous metastasis, within the sacrum, bilateral iliac bones and several vertebral bodies. Partially imaged 3.9 cm enhancing lobulated soft tissue mass within the urinary bladder, suspicious for neoplasm.    Liver biopsy:  - Metastatic high grade carcinoma, poorly differentiated with urothelial and neuroendocrine features. See comment.      #Leucocytosis  #Normocytic anemia   K/L 2.2 K 2.13, L 0.97     # Worsening bilirubinemia/transaminitis:    Recommendations:            The patient is a 77 y/o male w/pmhx of HTN, HLD, hx of frequent nephrothiliasis and recent passage of a stone a few days ago w/trace hematuria, cervical neck arthritis previous use of opioid pain medication, presenting for 10 days of diarrhea, abdominal pain.Notes having 30-40 loose stools over the past week and over the past day he's had 3-5 loose stools.     #Metastatic high grade urothelial cancer  #Innumerable bilobar hepatic masses,   #mildly enlarged upper abdominal lymph nodes  #foci of osseous metastasis    -Presented with c/o diarrhea   -CT A/p IMPRESSION: Dense irregularity within the urinary bladder measuring up to 5.3 cm   concerning for an underlying lesion. No hydronephrosis. Prominent retroperitoneal lymph nodes measuring up to 1 cm. Cirrhotic morphology of the liver. Hepatic steatosis. 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   49 , AFP 2.4,   Evaluated by urology, Pt. will need to F/U with Dr. Caldwell for further  management of bladder mass.  MR 9.24: Enlarged heterogeneous liver with innumerable bilobar hepatic masses,   suspicious for metastatic disease. Multiple subcentimeter and mildly enlarged upper abdominal lymph nodes, as detailed above, suspicious for lymph node involvement. Numerous foci of osseous metastasis, within the sacrum, bilateral iliac bones and several vertebral bodies. Partially imaged 3.9 cm enhancing lobulated soft tissue mass within the urinary bladder, suspicious for neoplasm.    Liver biopsy showed:   - Metastatic high grade carcinoma, poorly differentiated with urothelial and neuroendocrine features. See comment.      #Leucocytosis  #Normocytic anemia   K/L 2.2 K 2.13, L 0.97     # Worsening bilirubinemia/transaminitis:  Likely d/t liver mets    # Hematuria:  Urology following  Having difficulty voiding today, difficult arcos insertion    Recommendations:     We had recommended hospice during our prior visit, however he is not ready for hospice yet.  He again confirms that he wants to try chemotherapy, side effects discussed and consent received.  Given his comorbidities, hyperbilirubinemia, he is at risk of increased toxicities and including death from chemotherapy.  Plan for Gemcitabine 650 mg/m2 and Carboplatin AUC 4 (dose reduced), given his elevated bilirubin.  Plan to start first cycle tomorrow.         The patient is a 77 y/o male with HTN, HLD, hx of frequent nephrolithiasis and recent passage of a stone a few days ago w/trace hematuria, cervical neck arthritis previous use of opioid pain medication, presenting for 10 days of diarrhea, abdominal pain. Notes having 30-40 loose stools over the past week and over the past day he's had 3-5 loose stools.     #Metastatic high grade urothelial cancer  #Innumerable bilobar hepatic masses,   #Mildly enlarged upper abdominal lymph nodes  #Foci of osseous metastasis    -Presented with c/o diarrhea   -CT A/p IMPRESSION: Dense irregularity within the urinary bladder measuring up to 5.3 cm concerning for an underlying lesion. No hydronephrosis. Prominent retroperitoneal lymph nodes measuring up to 1 cm. Cirrhotic morphology of the liver. Hepatic steatosis. 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   49 , AFP 2.4,   Evaluated by urology, Pt will need to F/U with Dr. Caldwell for further  management of bladder mass.  MR 9.24: Enlarged heterogeneous liver with innumerable bilobar hepatic masses, suspicious for metastatic disease. Multiple subcentimeter and mildly enlarged upper abdominal lymph nodes, as detailed above, suspicious for lymph node involvement. Numerous foci of osseous metastasis, within the sacrum, bilateral iliac bones and several vertebral bodies. Partially imaged 3.9 cm enhancing lobulated soft tissue mass within the urinary bladder, suspicious for neoplasm.    Liver biopsy showed:   - Metastatic high grade carcinoma, poorly differentiated with urothelial and neuroendocrine features. See comment.      #Leucocytosis  #Normocytic anemia   K/L 2.2 K 2.13, L 0.97     # Worsening bilirubinemia/transaminitis:  Likely d/t liver mets    # Hematuria:  Urology following  Having difficulty voiding today, difficult arcos insertion    Recommendations:     He was recommended Hospice during prior visit, however he is not ready for Hospice yet.  He again confirms that he wants to try chemotherapy, side effects discussed and consent received.  Given his comorbidities, hyperbilirubinemia, he is at risk of increased toxicities and including death from chemotherapy.  Plan for Gemcitabine 650 mg/m2 and Carboplatin AUC 4 (dose reduced for both agents), given his elevated bilirubin.  Plan to start first cycle tomorrow.

## 2024-09-27 NOTE — PROGRESS NOTE ADULT - ATTENDING COMMENTS
Patient also seen by myself. I agree with the Hem-Onc fellow's note above. Case discussed with her and the patient.  All questions answered.

## 2024-09-28 NOTE — CHART NOTE - NSCHARTNOTEFT_GEN_A_CORE
Pt desatted to 86-88% on RA. Was started on NC- satting to 92% on NC. HR 90. Vitals otherwise stable.   On exam pt appears chronically ill, comfortable, no use of accessory muscles for breathing, Lungs clear to auscultation, no LE edema.   Per chart review, pt had previous episode of desatting to 90% on RA and was started on NC. CTA at that time was sent- originally read as PE and heparin drip was started but then reread as likely artifact although not definitive. Heparin drip was stopped.   Pt had episodes of hematuria- per urology: hold AC if not contraindicated.   CTA from prior had showed right pleural effusion with atelectasis- likely causing desaturation.  Repeat CXR sent.  Will continue to monitor on NC.

## 2024-09-28 NOTE — PROGRESS NOTE ADULT - ASSESSMENT
Pt is a 79y M with bladder mass newly dx metastatic urothelial ca;  recalled today for hematuria. Pt seen at bedside this morning - patient voiding clear cranberry colored urine into urinal. Notes that he feels like he is voiding well.     Plan:  - Continue IV fluids  - Pt voiding thin cranberry colored urine without clots  - Continue to monitor urine output; recommend serial bladder scans with PVR to assess for urinary retention  - Monitor H/h, transfuse prn, hold anticoagulation if not contraindicated  - Monitor Cr, Cr 0.9  - Patient with balanitis; recommend wash area with soap and water and place bacitracin to the affected area BID  - Recommend OP f/u w/  for cystoscopy and further hematuria w/u when medically optimal  - Will discuss with attending

## 2024-09-28 NOTE — PROGRESS NOTE ADULT - SUBJECTIVE AND OBJECTIVE BOX
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  Patient ate out on 9/6 and began to experience abdominal pain and diarrhea. Denies any vomiting but endorses nausea, notes having 30-40 loose stools over the past week and over the past day he's had 3-5 loose stools. No melena, no hematochezia, describes stools as watery and filled with mucus. No prior history of this in the past, last colonoscopy was 8 years ago and was normal per patient. Notes 8/10 epigastric and periumbilical pain. Decreased appetite due to pain and diarrhea. No fevers, no chills, no sick contacts, does not endorse eating anything out of the ordinary, does not recall his meal prior to the onset of symptoms. No cp, sob, no loc, no acute vision or hearing changes. No other ros symptoms reported.     ED course:   bp: 92 mm Hg/55 mm Hg  hr: 93 /min  rr: 18 /min  temp;97.7 Degrees F  oral  spo2: 96 %    wbc elevated 12k  lactate 4.4-->3.5-->2.2  cr 2.4 (unknown baseline)     CTAP w/IV contrast:     01. LIVER: Cirrhotic morphology. Hepatic steatosis.  02. SPLEEN: Normal unenhanced.  03. PANCREAS: Atrophic.  04. GALLBLADDER/BILIARY TREE: Gallbladder not definitively visualized.    No biliary duct dilatation.  05. ADRENALS: Normal.  06. KIDNEYS: Symmetric in size.  No hydronephrosis. Bilateral   nonobstructing renal stones measuring 1.5 x 0.7 cm and left renal pole   (series and 601 image 49) and 0.3 cm in the right renal lower pole   (series 3 image 142).  07. LYMPHADENOPATHY/RETROPERITONEUM: Prominent retroperitoneal lymph   nodes measuring upto 1 cm (series 4 image 43).  08. BOWEL: No bowel obstruction.  09. PELVIC VISCERA: Dense irregularity within the urinary bladder   measuring 5.3 x 4.6 cm (series 4 image 89). Prostate measures 3.7 cm in   transverse dimension (series 4 image 93).  10. PELVIC LYMPH NODES: No enlarged lymph nodes.  11. VASCULATURE: Diffuse calcified atherosclerotic disease of the aorta   and its branches. Multiple peripherally calcified vessels at the level of   the mid pancrea    impression:   No bowel obstruction.    Dense irregularity within the urinary bladder measuring up to 5.3 cm   concerning for an underlying lesion. No hydronephrosis.    Prominent retroperitoneal lymph nodes measuring up to 1 cm.    Cirrhotic morphology of the liver. Hepatic steatosis.    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     (17 Sep 2024 03:44)      Allergies    No Known Allergies    Intolerances        MEDICATIONS  (STANDING):  chlorhexidine 2% Cloths 1 Application(s) Topical daily  lactated ringers. 1000 milliLiter(s) (100 mL/Hr) IV Continuous <Continuous>  lactated ringers. 1000 milliLiter(s) (60 mL/Hr) IV Continuous <Continuous>  lactobacillus acidophilus 1 Tablet(s) Oral daily  polyethylene glycol 3350 17 Gram(s) Oral every 24 hours  senna 2 Tablet(s) Oral at bedtime  tamsulosin 0.4 milliGRAM(s) Oral daily    MEDICATIONS  (PRN):  acetaminophen     Tablet .. 650 milliGRAM(s) Oral every 6 hours PRN Severe Pain (7 - 10)  loperamide 2 milliGRAM(s) Oral every 6 hours PRN Diarrhea  oxyCODONE    IR 5 milliGRAM(s) Oral every 6 hours PRN Severe Pain (7 - 10)      PAST MEDICAL & SURGICAL HISTORY:  HTN (hypertension)      HLD (hyperlipidemia)      Kidney stone      No significant past surgical history          FAMILY HISTORY:  FH: CHF (congestive heart failure) (Father)        SOCIAL HISTORY: No EtOH, no tobacco    REVIEW OF SYSTEMS:    CONSTITUTIONAL: No weakness, fevers or chills  EYES/ENT: No visual changes;  No vertigo or throat pain   NECK: No pain or stiffness  RESPIRATORY: No cough, wheezing, hemoptysis; No shortness of breath  CARDIOVASCULAR: No chest pain or palpitations  GASTROINTESTINAL: No abdominal or epigastric pain. No nausea, vomiting, or hematemesis; No diarrhea or constipation. No melena or hematochezia.  GENITOURINARY: No dysuria, frequency or hematuria  NEUROLOGICAL: No numbness or weakness  SKIN: No itching, burning, rashes, or lesions   All other review of systems is negative unless indicated above.        T(F): 98.2 (09-28-24 @ 04:22), Max: 98.6 (09-27-24 @ 12:15)  HR: 92 (09-28-24 @ 04:22)  BP: 123/69 (09-28-24 @ 04:22)  RR: 18 (09-28-24 @ 04:22)  SpO2: 96% (09-28-24 @ 04:22)  Wt(kg): --    GENERAL: NAD, well-developed  HEAD:  Atraumatic, Normocephalic  EYES: EOMI, PERRLA, conjunctiva and sclera clear  NECK: Supple, No JVD  CHEST/LUNG: Clear to auscultation bilaterally; No wheeze  HEART: Regular rate and rhythm; No murmurs, rubs, or gallops  ABDOMEN: Soft, Nontender, Nondistended; Bowel sounds present  EXTREMITIES:  2+ Peripheral Pulses, No clubbing, cyanosis, or edema  NEUROLOGY: non-focal  SKIN: No rashes or lesions                          10.0   11.10 )-----------( 261      ( 27 Sep 2024 21:25 )             28.0       09-27    143  |  104  |  19  ----------------------------<  87  4.1   |  20  |  1.0    Ca    9.4      27 Sep 2024 07:22  Mg     2.2     09-27    TPro  5.2[L]  /  Alb  3.0[L]  /  TBili  5.5[H]  /  DBili  x   /  AST  283[H]  /  ALT  72[H]  /  AlkPhos  969[H]  09-27      Magnesium: 2.2 mg/dL (09-27 @ 07:22)       Oncology Consult Note    HPI:  The patient is a 77 y/o male with HTN, HLD, 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  Patient ate out on 9/6 and began to experience abdominal pain and diarrhea. Denies any vomiting but endorses nausea, notes having 30-40 loose stools over the past week and over the past day he's had 3-5 loose stools. No melena, no hematochezia, describes stools as watery and filled with mucus. No prior history of this in the past, last colonoscopy was 8 years ago and was normal per patient. Notes 8/10 epigastric and periumbilical pain. Decreased appetite due to pain and diarrhea. No fevers, no chills, no sick contacts, does not endorse eating anything out of the ordinary, does not recall his meal prior to the onset of symptoms. No cp, sob, no loc, no acute vision or hearing changes. No other ros symptoms reported.     ED course:   bp: 92 mm Hg/55 mm Hg  hr: 93 /min  rr: 18 /min  temp;97.7 Degrees F  oral  spo2: 96 %    wbc elevated 12k  lactate 4.4-->3.5-->2.2  cr 2.4 (unknown baseline)     CTAP w/IV contrast:     01. LIVER: Cirrhotic morphology. Hepatic steatosis.  02. SPLEEN: Normal unenhanced.  03. PANCREAS: Atrophic.  04. GALLBLADDER/BILIARY TREE: Gallbladder not definitively visualized.    No biliary duct dilatation.  05. ADRENALS: Normal.  06. KIDNEYS: Symmetric in size.  No hydronephrosis. Bilateral   non-obstructing renal stones measuring 1.5 x 0.7 cm and left renal pole   (series and 601 image 49) and 0.3 cm in the right renal lower pole   (series 3 image 142).  07. LYMPHADENOPATHY/RETROPERITONEUM: Prominent retroperitoneal lymph   nodes measuring upto 1 cm (series 4 image 43).  08. BOWEL: No bowel obstruction.  09. PELVIC VISCERA: Dense irregularity within the urinary bladder   measuring 5.3 x 4.6 cm (series 4 image 89). Prostate measures 3.7 cm in   transverse dimension (series 4 image 93).  10. PELVIC LYMPH NODES: No enlarged lymph nodes.  11. VASCULATURE: Diffuse calcified atherosclerotic disease of the aorta   and its branches. Multiple peripherally calcified vessels at the level of   the mid pancreas.    impression:   No bowel obstruction.    Dense irregularity within the urinary bladder measuring up to 5.3 cm   concerning for an underlying lesion. No hydronephrosis.    Prominent retroperitoneal lymph nodes measuring up to 1 cm.    Cirrhotic morphology of the liver. Hepatic steatosis.    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     (17 Sep 2024 03:44)      Allergies    No Known Allergies    Intolerances        MEDICATIONS  (STANDING):  chlorhexidine 2% Cloths 1 Application(s) Topical daily  lactated ringers. 1000 milliLiter(s) (100 mL/Hr) IV Continuous <Continuous>  lactated ringers. 1000 milliLiter(s) (60 mL/Hr) IV Continuous <Continuous>  lactobacillus acidophilus 1 Tablet(s) Oral daily  polyethylene glycol 3350 17 Gram(s) Oral every 24 hours  senna 2 Tablet(s) Oral at bedtime  tamsulosin 0.4 milliGRAM(s) Oral daily    MEDICATIONS  (PRN):  acetaminophen     Tablet .. 650 milliGRAM(s) Oral every 6 hours PRN Severe Pain (7 - 10)  loperamide 2 milliGRAM(s) Oral every 6 hours PRN Diarrhea  oxyCODONE    IR 5 milliGRAM(s) Oral every 6 hours PRN Severe Pain (7 - 10)      PAST MEDICAL & SURGICAL HISTORY:  HTN (hypertension)      HLD (hyperlipidemia)      Kidney stone      No significant past surgical history          FAMILY HISTORY:  FH: CHF (congestive heart failure) (Father)        SOCIAL HISTORY: No EtOH, no tobacco    REVIEW OF SYSTEMS:    CONSTITUTIONAL: No weakness, fevers or chills  EYES/ENT: No visual changes;  No vertigo or throat pain   NECK: No pain or stiffness  RESPIRATORY: No cough, wheezing, hemoptysis; No shortness of breath  CARDIOVASCULAR: No chest pain or palpitations  GASTROINTESTINAL: No abdominal or epigastric pain. No nausea, vomiting, or hematemesis; No diarrhea or constipation. No melena or hematochezia.  GENITOURINARY: No dysuria, frequency or hematuria  NEUROLOGICAL: No numbness or weakness  SKIN: No itching, burning, rashes, or lesions   All other review of systems is negative unless indicated above.        T(F): 98.2 (09-28-24 @ 04:22), Max: 98.6 (09-27-24 @ 12:15)  HR: 92 (09-28-24 @ 04:22)  BP: 123/69 (09-28-24 @ 04:22)  RR: 18 (09-28-24 @ 04:22)  SpO2: 96% (09-28-24 @ 04:22)  Wt(kg): --    GENERAL: NAD, well-developed  HEAD:  Atraumatic, Normocephalic  EYES: EOMI, PERRLA, conjunctiva and sclera clear  NECK: Supple, No JVD  CHEST/LUNG: Clear to auscultation bilaterally; No wheeze  HEART: Regular rate and rhythm; No murmurs, rubs, or gallops  ABDOMEN: Soft, Nontender, Nondistended; Bowel sounds present  EXTREMITIES:  2+ Peripheral Pulses, No clubbing, cyanosis, or edema  NEUROLOGY: non-focal  SKIN: No rashes or lesions                          10.0   11.10 )-----------( 261      ( 27 Sep 2024 21:25 )             28.0       09-27    143  |  104  |  19  ----------------------------<  87  4.1   |  20  |  1.0    Ca    9.4      27 Sep 2024 07:22  Mg     2.2     09-27    TPro  5.2[L]  /  Alb  3.0[L]  /  TBili  5.5[H]  /  DBili  x   /  AST  283[H]  /  ALT  72[H]  /  AlkPhos  969[H]  09-27      Magnesium: 2.2 mg/dL (09-27 @ 07:22)

## 2024-09-28 NOTE — PROGRESS NOTE ADULT - NS ATTEND AMEND GEN_ALL_CORE FT
Patient was seen and examined with Urology PA at bedside.   Labs and imaging were personally reviewed and interpreted by me and discussed with the patient.  Labs reviewed. H/H stable. Cr wnl.   Pt voiding freely. Obtain bladder scan to assess PVR.
Labs and imaging were personally reviewed and interpreted by me.  Agree w plan above.
Patient was seen and examined with Urology PA at bedside.   Labs and imaging were personally reviewed and interpreted by me and discussed with the patient.  CTAP significant for bilateral nonobstructing renal stones, no hydonephrosis and a irregular mass in the bladder measuring ~5cm in greatest dimension.   No plan for any acute intervention for patient's non-obstructing stones. WiIl need outpt follow up.   f/u urine cytology  F/u bladder scan PVR, if elevated will need catheter placement and start alpha blocker if no contraindications.   Pt will follow up with outpt cysto w Dr. Caldwell ( oncologist) as per Dr. Owens.

## 2024-09-28 NOTE — PROGRESS NOTE ADULT - ATTENDING COMMENTS
Patient also seen by myself. I agree with the Hem-Onc fellow's note above.  Situation discussed with her and the patient.  All questions answered.

## 2024-09-28 NOTE — PROGRESS NOTE ADULT - ASSESSMENT
The patient is a 78 y/o male w/pmhx of HTN, HLD, hx of frequent nephrothiliasis and recent passage of a stone a few days ago w/trace hematuria, cervical neck arthritis previous use of opioid pain medication, presenting for 10 days of diarrhea, abdominal pain.Notes having 30-40 loose stools over the past week and over the past day he's had 3-5 loose stools.     #Metastatic high grade urothelial cancer  #Innumerable bilobar hepatic masses,   #mildly enlarged upper abdominal lymph nodes  #foci of osseous metastasis  -Presented with c/o diarrhea   -CT A/p IMPRESSION: Dense irregularity within the urinary bladder measuring up to 5.3 cm   concerning for an underlying lesion. No hydronephrosis. Prominent retroperitoneal lymph nodes measuring up to 1 cm. Cirrhotic morphology of the liver. Hepatic steatosis. 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   49 , AFP 2.4,   Evaluated by urology, Pt. will need to F/U with Dr. Caldwell for further  management of bladder mass.  MR 9.24: Enlarged heterogeneous liver with innumerable bilobar hepatic masses,   suspicious for metastatic disease. Multiple subcentimeter and mildly enlarged upper abdominal lymph nodes, as detailed above, suspicious for lymph node involvement. Numerous foci of osseous metastasis, within the sacrum, bilateral iliac bones and several vertebral bodies. Partially imaged 3.9 cm enhancing lobulated soft tissue mass within the urinary bladder, suspicious for neoplasm.  Liver biopsy showed:   - Metastatic high grade carcinoma, poorly differentiated with urothelial and neuroendocrine features. See comment.      #Leucocytosis  #Normocytic anemia   K/L 2.2 K 2.13, L 0.97     # Worsening bilirubinemia/transaminitis:  Likely d/t liver mets    # Hematuria:  Urology following  Having difficulty voiding today, difficult arcos insertion    Recommendations:   - We had recommended hospice during our prior visit, however he is not ready for hospice yet. He again confirms that he wants to try chemotherapy, side effects discussed and consent received.  - Given his comorbidities, hyperbilirubinemia, he is at risk of increased toxicities and including death from chemotherapy.  - Plan for Gemcitabine 650 mg/m2 and Carboplatin AUC 4 (dose reduced), given his elevated bilirubin, plan to initiate treatment today 9/28  The patient is a 80 y/o male with HTN, HLD, hx of frequent nephrolithiases, and recent passage of a stone a few days ago w/trace hematuria, cervical neck arthritis previous use of opioid pain medication, presenting for 10 days of diarrhea, abdominal pain. Notes having 30-40 loose stools over the past week and over the past day he's had 3-5 loose stools.     #Metastatic high grade urothelial cancer  #Innumerable bilobar hepatic masses,   #Mildly enlarged upper abdominal lymph nodes  #Foci of osseous metastasis  -Presented with c/o diarrhea   -CT A/p IMPRESSION: Dense irregularity within the urinary bladder measuring up to 5.3 cm   concerning for an underlying lesion. No hydronephrosis. Prominent retroperitoneal lymph nodes measuring up to 1 cm. Cirrhotic morphology of the liver. Hepatic steatosis. 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   49 , AFP 2.4,   Evaluated by urology, Pt will need to F/U with Dr. Caldwell for further  management of bladder mass.  MR 9.24: Enlarged heterogeneous liver with innumerable bilobar hepatic masses, suspicious for metastatic disease. Multiple subcentimeter and mildly enlarged upper abdominal lymph nodes, as detailed above, suspicious for lymph node involvement. Numerous foci of osseous metastases within the sacrum, bilateral iliac bones and several vertebral bodies. Partially imaged 3.9 cm enhancing lobulated soft tissue mass within the urinary bladder, suspicious for neoplasm.  Liver biopsy showed:   - Metastatic high grade carcinoma, poorly differentiated with urothelial and neuroendocrine features. See comment.      #Leucocytosis  #Normocytic anemia   K/L 2.2 K 2.13, L 0.97     # Worsening bilirubinemia/transaminitis:  Likely d/t liver mets    # Hematuria:  Urology following  Having difficulty voiding today, difficult [place] insertion    Recommendations:   - We had recommended Hospice during our prior visit, however he is not ready for Hospice yet. He again confirms that he wants to try chemotherapy, side effects discussed and consent received.  - Given his comorbidities, hyperbilirubinemia, he is at risk of increased toxicities and including death from chemotherapy.  - Plan for Gemcitabine 650 mg/m2 and Carboplatin AUC 4 (dose reduced), given his elevated bilirubin, plan to initiate treatment today 9/28

## 2024-09-28 NOTE — PROGRESS NOTE ADULT - SUBJECTIVE AND OBJECTIVE BOX
Urology Progress Note:   Pt is a 79y M with bladder mass newly dx metastatic urothelial ca;  recalled today for hematuria. Pt seen at bedside this morning - patient voiding clear cranberry colored urine into urinal. Notes that he feels like he is voiding well.     [ x ] A 10 Point Review of Systems was negative except where noted    PAST MEDICAL & SURGICAL HISTORY:  HTN (hypertension)  HLD (hyperlipidemia)  Kidney stone  No significant past surgical history    MEDICATIONS  (STANDING):  CARBOplatin IVPB (eMAR) 320 milliGRAM(s) IV Intermittent once  chlorhexidine 2% Cloths 1 Application(s) Topical daily  dexAMETHasone  IVPB 10 milliGRAM(s) IV Intermittent once  fosaprepitant IVPB 150 milliGRAM(s) IV Intermittent once  gemcitabine IVPB (eMAR) 1100 milliGRAM(s) IV Intermittent once  lactated ringers. 1000 milliLiter(s) (60 mL/Hr) IV Continuous <Continuous>  lactated ringers. 1000 milliLiter(s) (100 mL/Hr) IV Continuous <Continuous>  lactobacillus acidophilus 1 Tablet(s) Oral daily  ondansetron  IVPB 16 milliGRAM(s) IV Intermittent once  polyethylene glycol 3350 17 Gram(s) Oral every 24 hours  senna 2 Tablet(s) Oral at bedtime  tamsulosin 0.4 milliGRAM(s) Oral daily    MEDICATIONS  (PRN):  acetaminophen     Tablet .. 650 milliGRAM(s) Oral every 6 hours PRN Severe Pain (7 - 10)  loperamide 2 milliGRAM(s) Oral every 6 hours PRN Diarrhea  oxyCODONE    IR 5 milliGRAM(s) Oral every 6 hours PRN Severe Pain (7 - 10)    Allergies: No Known Allergies    SOCIAL HISTORY: No illicit drug use    FAMILY HISTORY: CHF (congestive heart failure) (Father)    PHYSICAL EXAM:  Constitutional: NAD, well-developed  Back: No CVA ttp bilaterally  Resp: No accessory respiratory muscle use  Abd: Soft, NT, distended. No suprapubic ttp  : Uncircumcised, testicles x2 nonttp and symmetric. +inflammation noted to glans (blanaitis), Meatus patent and without blood or discharge  Ext: No edema or cyanosis, ROSARIO x 4  Neuro: Awake and alert  Psych: Normal mood, normal affect  Skin: Good color, non diaphoretic    Vital Signs Last 24 Hrs  T(C): 36.8 (28 Sep 2024 04:22), Max: 37 (27 Sep 2024 12:15)  T(F): 98.2 (28 Sep 2024 04:22), Max: 98.6 (27 Sep 2024 12:15)  HR: 92 (28 Sep 2024 04:22) (92 - 103)  BP: 123/69 (28 Sep 2024 04:22) (112/52 - 125/53)  RR: 18 (28 Sep 2024 09:04) (18 - 18)  SpO2: 96% (28 Sep 2024 09:04) (96% - 97%)    Parameters below as of 28 Sep 2024 09:04  Patient On (Oxygen Delivery Method): room air    I&O's Summary  27 Sep 2024 07:01  -  28 Sep 2024 07:00  --------------------------------------------------------  IN: 0 mL / OUT: 350 mL / NET: -350 mL        LABS:                        10.5   10.75 )-----------( 266      ( 28 Sep 2024 05:54 )             29.4     09-28  139  |  103  |  17  ----------------------------<  86  3.7   |  18  |  0.9    Ca    9.4      28 Sep 2024 05:54  Mg     1.8     09-28    TPro  4.8[L]  /  Alb  2.9[L]  /  TBili  5.8[H]  /  DBili  x   /  AST  323[H]  /  ALT  86[H]  /  AlkPhos  908[H]  09-28  PT/INR - ( 27 Sep 2024 21:25 )   PT: 13.40 sec;   INR: 1.17 ratio    PTT - ( 27 Sep 2024 21:25 )  PTT:31.9 sec    Urinalysis Basic - ( 28 Sep 2024 05:54 )  Color: x / Appearance: x / SG: x / pH: x  Gluc: 86 mg/dL / Ketone: x  / Bili: x / Urobili: x   Blood: x / Protein: x / Nitrite: x   Leuk Esterase: x / RBC: x / WBC x   Sq Epi: x / Non Sq Epi: x / Bacteria: x

## 2024-09-28 NOTE — PROGRESS NOTE ADULT - SUBJECTIVE AND OBJECTIVE BOX
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  Patient ate out on 9/6 and began to experience abdominal pain and diarrhea. Denies any vomiting but endorses nausea, notes having 30-40 loose stools over the past week and over the past day he's had 3-5 loose stools. No melena, no hematochezia, describes stools as watery and filled with mucus. No prior history of this in the past, last colonoscopy was 8 years ago and was normal per patient. Notes 8/10 epigastric and periumbilical pain. Decreased appetite due to pain and diarrhea. No fevers, no chills, no sick contacts, does not endorse eating anything out of the ordinary, does not recall his meal prior to the onset of symptoms. No cp, sob, no loc, no acute vision or hearing changes. No other ros symptoms reported.     ED course:   bp: 92 mm Hg/55 mm Hg  hr: 93 /min  rr: 18 /min  temp;97.7 Degrees F  oral  spo2: 96 %    wbc elevated 12k  lactate 4.4-->3.5-->2.2  cr 2.4 (unknown baseline)     CTAP w/IV contrast:     01. LIVER: Cirrhotic morphology. Hepatic steatosis.  02. SPLEEN: Normal unenhanced.  03. PANCREAS: Atrophic.  04. GALLBLADDER/BILIARY TREE: Gallbladder not definitively visualized.    No biliary duct dilatation.  05. ADRENALS: Normal.  06. KIDNEYS: Symmetric in size.  No hydronephrosis. Bilateral   nonobstructing renal stones measuring 1.5 x 0.7 cm and left renal pole   (series and 601 image 49) and 0.3 cm in the right renal lower pole   (series 3 image 142).  07. LYMPHADENOPATHY/RETROPERITONEUM: Prominent retroperitoneal lymph   nodes measuring upto 1 cm (series 4 image 43).  08. BOWEL: No bowel obstruction.  09. PELVIC VISCERA: Dense irregularity within the urinary bladder   measuring 5.3 x 4.6 cm (series 4 image 89). Prostate measures 3.7 cm in   transverse dimension (series 4 image 93).  10. PELVIC LYMPH NODES: No enlarged lymph nodes.  11. VASCULATURE: Diffuse calcified atherosclerotic disease of the aorta   and its branches. Multiple peripherally calcified vessels at the level of   the mid pancrea    impression:   No bowel obstruction.    Dense irregularity within the urinary bladder measuring up to 5.3 cm   concerning for an underlying lesion. No hydronephrosis.    Prominent retroperitoneal lymph nodes measuring up to 1 cm.    Cirrhotic morphology of the liver. Hepatic steatosis.    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     (17 Sep 2024 03:44)      Interval/Overnight Events      ROS  General: Denies fevers, chills, nightsweats, weight loss  HEENT: Denies headache, rhinorrhea, sore throat, eye pain  CV: Denies CP, palpitations  PULM: Denies SOB, cough  GI: Denies abdominal pain, diarrhea  : Denies dysuria, hematuria  MSK: Denies arthralgias  SKIN: Denies rash   NEURO: Denies paresthesias, weakness  PSYCH: Denies depression    MEDICATIONS  (STANDING):  CARBOplatin IVPB (eMAR) 320 milliGRAM(s) IV Intermittent once  chlorhexidine 2% Cloths 1 Application(s) Topical daily  dexAMETHasone  IVPB 10 milliGRAM(s) IV Intermittent once  fosaprepitant IVPB 150 milliGRAM(s) IV Intermittent once  gemcitabine IVPB (eMAR) 1100 milliGRAM(s) IV Intermittent once  lactated ringers. 1000 milliLiter(s) (60 mL/Hr) IV Continuous <Continuous>  lactated ringers. 1000 milliLiter(s) (100 mL/Hr) IV Continuous <Continuous>  lactobacillus acidophilus 1 Tablet(s) Oral daily  ondansetron  IVPB 16 milliGRAM(s) IV Intermittent once  polyethylene glycol 3350 17 Gram(s) Oral every 24 hours  senna 2 Tablet(s) Oral at bedtime  tamsulosin 0.4 milliGRAM(s) Oral daily    MEDICATIONS  (PRN):  acetaminophen     Tablet .. 650 milliGRAM(s) Oral every 6 hours PRN Severe Pain (7 - 10)  loperamide 2 milliGRAM(s) Oral every 6 hours PRN Diarrhea  oxyCODONE    IR 5 milliGRAM(s) Oral every 6 hours PRN Severe Pain (7 - 10)    ANTIBIOTICS:      VITALS:  T(F): 98.2, Max: 98.6 (09-27-24 @ 12:15)  HR: 92  BP: 123/69  RR: 18Vital Signs Last 24 Hrs  T(C): 36.8 (28 Sep 2024 04:22), Max: 37 (27 Sep 2024 12:15)  T(F): 98.2 (28 Sep 2024 04:22), Max: 98.6 (27 Sep 2024 12:15)  HR: 92 (28 Sep 2024 04:22) (92 - 103)  BP: 123/69 (28 Sep 2024 04:22) (112/52 - 125/53)  BP(mean): --  RR: 18 (28 Sep 2024 09:04) (18 - 18)  SpO2: 96% (28 Sep 2024 09:04) (96% - 97%)    Parameters below as of 28 Sep 2024 09:04  Patient On (Oxygen Delivery Method): room air     I&O's Summary    27 Sep 2024 07:01  -  28 Sep 2024 07:00  --------------------------------------------------------  IN: 0 mL / OUT: 350 mL / NET: -350 mL      PHYSICAL EXAM:  Gen: NAD, resting in bed  HEENT: Normocephalic, atraumatic  Neck: supple, no lymphadenopathy  CV: Regular rate & regular rhythm  Lungs: CTABL no wheeze  Abdomen: Soft, NTND+ BS present  Ext: Warm, well perfused no CCE  Neuro: non focal, awake, CN II-XII intact   Skin: no rash, no erythema  Psych: no SI, HI, Hallucination     LABS:                        10.5   10.75 )-----------( 266      ( 28 Sep 2024 05:54 )             29.4     09-28    139  |  103  |  17  ----------------------------<  86  3.7   |  18  |  0.9    Ca    9.4      28 Sep 2024 05:54  Mg     1.8     09-28    TPro  4.8[L]  /  Alb  2.9[L]  /  TBili  5.8[H]  /  DBili  x   /  AST  323[H]  /  ALT  86[H]  /  AlkPhos  908[H]  09-28      LIVER FUNCTIONS - ( 28 Sep 2024 05:54 )  Alb: 2.9 g/dL / Pro: 4.8 g/dL / ALK PHOS: 908 U/L / ALT: 86 U/L / AST: 323 U/L / GGT: x           PT/INR - ( 27 Sep 2024 21:25 )   PT: 13.40 sec;   INR: 1.17 ratio         PTT - ( 27 Sep 2024 21:25 )  PTT:31.9 sec    MICROBIOLOGY:  Urinalysis Basic - ( 28 Sep 2024 05:54 )    Color: x / Appearance: x / SG: x / pH: x  Gluc: 86 mg/dL / Ketone: x  / Bili: x / Urobili: x   Blood: x / Protein: x / Nitrite: x   Leuk Esterase: x / RBC: x / WBC x   Sq Epi: x / Non Sq Epi: x / Bacteria: x        Culture - Urine (collected 09-24-24 @ 11:20)  Source: Clean Catch Clean Catch (Midstream)  Final Report (09-25-24 @ 17:27):    <10,000 CFU/mL Normal Urogenital Larissa      SARS-CoV-2: NotDetec (19 Sep 2024 08:54)      HIV-1/2 Combo Result: Nonreact (09-19-24 @ 11:39)  Hepatitis B Surface Antigen: Nonreact (09-18-24 @ 05:07)      IMAGING:  CXR      CT  CT Angio Chest PE Protocol w/ IV Cont:   ACC: [id number] EXAM:  CT ANGIO CHEST PULM ART WAWI   ORDERED BY: REJI CHAVEZ     *** ADDENDUM # 1 ***    The findings were discussed with Dr. Theodore at 9:55 PM 9/27/2024 with   readback.    In addition an 8.3 mm nodule is noted in left upper lobe adjacent to the   vessels.    Fleischner Society Guidelines for Management of Incidentally Detected   Pulmonary Nodules   Lesions >8 mm Fleischner recommendations: Consider 3 month follow-up,   PET-CT; or tissue sampling.    --- End of Report ---    *** END OF ADDENDUM # 1 ***      PROCEDURE DATE:  09/27/2024          INTERPRETATION:  CLINICAL INFORMATION: Shortness of breath, tachycardia    COMPARISON: None available.    CONTRAST/COMPLICATIONS:  IV Contrast: Omnipaque 350  80 cc [id number] cc discarded  Oral Contrast: NONE  Complications: None reported at time of study completion    PROCEDURE:  CT Angiography of the Chest.  Sagittal and coronal reformats were performed as well as 3D (MIP)   reconstructions.  There is breathing motion artifact.    FINDINGS:    PULMONARY EMBOLUS: Small filling defect in the left lower lobe segmental   pulmonary artery (605-122, 604-250, 3-116).    LUNGS AND LARGE AIRWAYS: Patent central airways. No pulmonary nodules.   Compression atelectasis at the right lung base. Bronchiectasis and   fibrotic change in the right upper lobe.  PLEURA: Moderate right-sided pleural effusion with adjacent atelectatic   changes.  VESSELS: Aortic calcifications. Coronary artery calcifications.  HEART: Heart size is normal. No evidence of right heart strain. No   pericardial effusion.  MEDIASTINUM AND TARAH: No lymphadenopathy.  CHEST WALL AND LOWER NECK: Within normal limits.  VISUALIZED UPPER ABDOMEN: Hepatomegaly with innumerable hypoattenuating   lesions, better visualized on MR abdomen 9/19/2024.  BONES: Degenerative changes.    IMPRESSION:    Small filling defect in the left lower lobe segmental pulmonary artery   without evidence of right heart strain.    (Attending comment: Due to motion artifact, evaluation is limited and a   small filling defect may represent artifact.)    Moderate right-sided pleural effusion.    Hepatomegaly with innumerable hypoattenuating lesions, better visualized   on MR abdomen 9/19/2024.        Preliminary findings werediscussed with Dr. THEODORE 9/27/2024 8:13 PM by   Dr. Juarez Gross M.D. with read back confirmation.    A callback was requested.    --- End of Report ---    ***Please see the addendum at the top of this report. It may contain   additional important information or changes.****      JUAREZ GROSS MD; Resident Radiologist  This document has been electronically signed.  MERON BELLO MD; Attending Interventional Radiologist  This document has been electronically signed. Sep 27 2024  9:39PM  1st Addendum: MERON BELLO MD; Attending Interventional Radiologist  The first addendum was electronically signed on: Sep 27 2024 10:02PM. (09-27-24 @ 17:11)    CARDIOLOGY TESTING  QRS axis to [] ° and NSR at a rate of [] BPM. There was no atrial enlargement. There was no ventricular hypertrophy. There were no ST-T changes and all intervals were normal.    12 Lead ECG:   Ventricular Rate 105 BPM    Atrial Rate 105 BPM    P-R Interval 148 ms    QRS Duration 100 ms    Q-T Interval 352 ms    QTC Calculation(Bazett) 465 ms    P Axis 60 degrees    R Axis 19 degrees    T Axis 147 degrees    Diagnosis Line Sinus tachycardia  ST & T wave abnormality, consider lateral ischemia  Abnormal ECG    Confirmed by HUMZA LIZAMA MD ([id number]) on 9/27/2024 11:21:08 PM (09-27-24 @ 10:41)  12 Lead ECG:   Ventricular Rate 78 BPM    Atrial Rate 78 BPM    P-R Interval 156 ms    QRS Duration 102 ms    Q-T Interval 414 ms    QTC Calculation(Bazett) 471 ms    P Axis 53 degrees    R Axis -35 degrees    T Axis 69 degrees    Diagnosis Line Normal sinus rhythm  Left axis deviation  Anteroseptal infarct , age undetermined  Abnormal ECG    Confirmed by James Chairez ([id number]) on 9/17/2024 3:48:15 PM (09-17-24 @ 04:59)      ASSESSMENT/PLAN:  The patient is a 79 y/o male w/pmhx of HTN, HLD, 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  Patient ate out on 9/6 and began to experience abdominal pain and diarrhea. Denies any vomiting but endorses nausea, notes having 30-40 loose stools over the past week and over the past day he's had 3-5 loose stools. No melena, no hematochezia, describes stools as watery and filled with mucus. No prior history of this in the past, last colonoscopy was 8 years ago and was normal per patient. Notes 8/10 epigastric and periumbilical pain. Decreased appetite due to pain and diarrhea. No fevers, no chills, no sick contacts, does not endorse eating anything out of the ordinary, does not recall his meal prior to the onset of symptoms. No cp, sob, no loc, no acute vision or hearing changes. No other ros symptoms reported.     #bladder mass  - Dense irregularity within the urinary bladder measuring up to 5.3 cm   - MR abdomen --> Enlarged heterogeneous liver with innumerable bilobar hepatic masses, suspicious for metastatic disease. Multiple subcentimeter and mildly enlarged upper abdominal lymph nodes, as detailed above, suspicious for lymph node involvement. Numerous foci of osseous metastasis, as detailed above. Partially imaged 3.9 cm enhancing lobulated soft tissue mass within the urinary bladder, suspicious for neoplasm.  concerning for an underlying lesion. No hydronephrosis.  -Pt s/p biopsy with IR 9/23: remarkable for cytopathology consistent w/ bladder cancer      -Heme-onc aware. F/u recs   - urology evaluated - o/p f/u  -Pt to undergo treatment w/ heme-onc per family decision    #?Liver mets   #transaminitis  diarrhea (resolved)  -pt has history of fentanyl patch use - could be accounting for diarrhea with w/d diarrhea  -pt states two weeks when abdominal pain and diarrhea started, he had shrimp/seafood from [employer]  - CT a/p showing cirrhotic morphology - suspect metastasis from bladder tumor    - RUQ US: cirrhotic liver  - t bili 2.6 --> 2.1 --> 3.1--> 4.3  -  ALT 61  - lipase 81  - hepatitis panel negative  - GI PCR panel negative, C. Diff negative  - AFB ngtd, bcx ngtd, ua negative   - Not on antibx  - Hepatology consult noted  - CA 19-9 +ve, AFP -ve, HIV -ve, IgM 27  - CLD workup neg   -oxycodone 5mg q6hr added for pain control s/p biopsy     #tachypnea   -pt was tachycardic this AM  -EKG obtained: showed sinus tach  -pt desaturated to 90% on RA. NC started   -f/u d-dimer     #dysuria  Pt complaints of burning with urination   -UA +ketones, protein, small LE, blood - unchanged from prior    #urinary urgency  -has frequent urge to urinate but unable to urinate large volume  -c/w flomax     #hematuria  -MRI Abd 9/19 neg for hydro  -f/u uro consult     #kanwal, HAGMA (resolved)  -suspect HAGMA in setting of KANWAL  cr 1.1 today  - kanwal likely pre-renal from dehydration     #HTN  -currently on no meds- BP WNL   - hold ACE in setting of KANWAL    #HLD  - hold statin due to elevated LFTs    #Nephrolithiasis   - notes passing kidney stones in past and had one recently       #Supportive Management:  Dispo:  Home vs SNF  DVT Ppx: GI Ppx: Diet:  Diet, Regular:   Fiber/Residue Restricted (LOWFIBER)  DASH/TLC Sodium & Cholesterol Restricted (DASH) (09-20-24 @ 10:47) [Active]      Total time spent to complete patient's bedside assessment, review medical chart, discuss medical plan of care with covering medical team was more than 45 minutes with >50% of time spent face to face with patient, discussion with patient/family and/or coordination of care    Housestaff's notes reviewed. When there is confusion regarding the content or information provided in the notes of a housestaff (resident, medical student, physician assistant, or nurse practioner) and the attending physician notes, the attending physician's note takes precedence and supersedes the other notes.    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  Patient ate out on 9/6 and began to experience abdominal pain and diarrhea. Denies any vomiting but endorses nausea, notes having 30-40 loose stools over the past week and over the past day he's had 3-5 loose stools. No melena, no hematochezia, describes stools as watery and filled with mucus. No prior history of this in the past, last colonoscopy was 8 years ago and was normal per patient. Notes 8/10 epigastric and periumbilical pain. Decreased appetite due to pain and diarrhea. No fevers, no chills, no sick contacts, does not endorse eating anything out of the ordinary, does not recall his meal prior to the onset of symptoms. No cp, sob, no loc, no acute vision or hearing changes. No other ros symptoms reported.    (17 Sep 2024 03:44)    Interval/Overnight Events  - No acute complaints  - Chemo today  - Hematuria improving  - Will hold AC until Hgb stabilizes  - Urology follows    ROS  General: Denies fevers, chills, nightsweats, weight loss  HEENT: Denies headache, rhinorrhea, sore throat, eye pain  CV: Denies CP, palpitations  PULM: Denies SOB, cough  GI: Denies abdominal pain, diarrhea  : Denies dysuria, hematuria  MSK: Denies arthralgias  SKIN: Denies rash   NEURO: Denies paresthesias, weakness  PSYCH: Denies depression    MEDICATIONS  (STANDING):  CARBOplatin IVPB (eMAR) 320 milliGRAM(s) IV Intermittent once  chlorhexidine 2% Cloths 1 Application(s) Topical daily  dexAMETHasone  IVPB 10 milliGRAM(s) IV Intermittent once  fosaprepitant IVPB 150 milliGRAM(s) IV Intermittent once  gemcitabine IVPB (eMAR) 1100 milliGRAM(s) IV Intermittent once  lactated ringers. 1000 milliLiter(s) (60 mL/Hr) IV Continuous <Continuous>  lactated ringers. 1000 milliLiter(s) (100 mL/Hr) IV Continuous <Continuous>  lactobacillus acidophilus 1 Tablet(s) Oral daily  ondansetron  IVPB 16 milliGRAM(s) IV Intermittent once  polyethylene glycol 3350 17 Gram(s) Oral every 24 hours  senna 2 Tablet(s) Oral at bedtime  tamsulosin 0.4 milliGRAM(s) Oral daily    MEDICATIONS  (PRN):  acetaminophen     Tablet .. 650 milliGRAM(s) Oral every 6 hours PRN Severe Pain (7 - 10)  loperamide 2 milliGRAM(s) Oral every 6 hours PRN Diarrhea  oxyCODONE    IR 5 milliGRAM(s) Oral every 6 hours PRN Severe Pain (7 - 10)    ANTIBIOTICS:    VITALS:  T(F): 98.2, Max: 98.6 (09-27-24 @ 12:15)  HR: 92  BP: 123/69  RR: 18Vital Signs Last 24 Hrs  T(C): 36.8 (28 Sep 2024 04:22), Max: 37 (27 Sep 2024 12:15)  T(F): 98.2 (28 Sep 2024 04:22), Max: 98.6 (27 Sep 2024 12:15)  HR: 92 (28 Sep 2024 04:22) (92 - 103)  BP: 123/69 (28 Sep 2024 04:22) (112/52 - 125/53)  BP(mean): --  RR: 18 (28 Sep 2024 09:04) (18 - 18)  SpO2: 96% (28 Sep 2024 09:04) (96% - 97%)    Parameters below as of 28 Sep 2024 09:04  Patient On (Oxygen Delivery Method): room air     I&O's Summary    27 Sep 2024 07:01  -  28 Sep 2024 07:00  --------------------------------------------------------  IN: 0 mL / OUT: 350 mL / NET: -350 mL      PHYSICAL EXAM:  Gen: NAD, resting in bed  HEENT: Normocephalic, atraumatic  Neck: supple, no lymphadenopathy  CV: Regular rate & regular rhythm  Lungs: CTABL no wheeze  Abdomen: Soft, NTND+ BS present  Ext: Warm, well perfused no CCE  Neuro: non focal, awake, CN II-XII intact   Skin: no rash, no erythema  Psych: no SI, HI, Hallucination     LABS:                        10.5   10.75 )-----------( 266      ( 28 Sep 2024 05:54 )             29.4     09-28    139  |  103  |  17  ----------------------------<  86  3.7   |  18  |  0.9    Ca    9.4      28 Sep 2024 05:54  Mg     1.8     09-28    TPro  4.8[L]  /  Alb  2.9[L]  /  TBili  5.8[H]  /  DBili  x   /  AST  323[H]  /  ALT  86[H]  /  AlkPhos  908[H]  09-28      LIVER FUNCTIONS - ( 28 Sep 2024 05:54 )  Alb: 2.9 g/dL / Pro: 4.8 g/dL / ALK PHOS: 908 U/L / ALT: 86 U/L / AST: 323 U/L / GGT: x           PT/INR - ( 27 Sep 2024 21:25 )   PT: 13.40 sec;   INR: 1.17 ratio    PTT - ( 27 Sep 2024 21:25 )  PTT:31.9 sec    MICROBIOLOGY:  Urinalysis Basic - ( 28 Sep 2024 05:54 )  Color: x / Appearance: x / SG: x / pH: x  Gluc: 86 mg/dL / Ketone: x  / Bili: x / Urobili: x   Blood: x / Protein: x / Nitrite: x   Leuk Esterase: x / RBC: x / WBC x   Sq Epi: x / Non Sq Epi: x / Bacteria: x    Culture - Urine (collected 09-24-24 @ 11:20)  Source: Clean Catch Clean Catch (Midstream)  Final Report (09-25-24 @ 17:27):    <10,000 CFU/mL Normal Urogenital Larissa    SARS-CoV-2: NotDetec (19 Sep 2024 08:54)    HIV-1/2 Combo Result: Nonreact (09-19-24 @ 11:39)  Hepatitis B Surface Antigen: Nonreact (09-18-24 @ 05:07)      IMAGING:  CT Angio Chest PE Protocol w/ IV Cont:   ACC: [id number] EXAM:  CT ANGIO CHEST PULM ART WAWIC   ORDERED BY: REJI CHAVEZ     *** ADDENDUM # 1 ***    The findings were discussed with Dr. Theodore at 9:55 PM 9/27/2024 with   readback.    In addition an 8.3 mm nodule is noted in left upper lobe adjacent to the vessels.    Fleischner Society Guidelines for Management of Incidentally Detected   Pulmonary Nodules   Lesions >8 mm Fleischner recommendations: Consider 3 month follow-up,   PET-CT; or tissue sampling.    --- End of Report ---    *** END OF ADDENDUM # 1 ***      PROCEDURE DATE:  09/27/2024          INTERPRETATION:  CLINICAL INFORMATION: Shortness of breath, tachycardia    COMPARISON: None available.    CONTRAST/COMPLICATIONS:  IV Contrast: Omnipaque 350  80 cc [id number] cc discarded  Oral Contrast: NONE  Complications: None reported at time of study completion    PROCEDURE:  CT Angiography of the Chest.  Sagittal and coronal reformats were performed as well as 3D (MIP)   reconstructions.  There is breathing motion artifact.    FINDINGS:    PULMONARY EMBOLUS: Small filling defect in the left lower lobe segmental   pulmonary artery (605-122, 604-250, 3-116).    LUNGS AND LARGE AIRWAYS: Patent central airways. No pulmonary nodules.   Compression atelectasis at the right lung base. Bronchiectasis and   fibrotic change in the right upper lobe.  PLEURA: Moderate right-sided pleural effusion with adjacent atelectatic   changes.  VESSELS: Aortic calcifications. Coronary artery calcifications.  HEART: Heart size is normal. No evidence of right heart strain. No   pericardial effusion.  MEDIASTINUM AND TARAH: No lymphadenopathy.  CHEST WALL AND LOWER NECK: Within normal limits.  VISUALIZED UPPER ABDOMEN: Hepatomegaly with innumerable hypoattenuating   lesions, better visualized on MR abdomen 9/19/2024.  BONES: Degenerative changes.    IMPRESSION:    Small filling defect in the left lower lobe segmental pulmonary artery   without evidence of right heart strain.    (Attending comment: Due to motion artifact, evaluation is limited and a   small filling defect may represent artifact.)    Moderate right-sided pleural effusion.    Hepatomegaly with innumerable hypoattenuating lesions, better visualized   on MR abdomen 9/19/2024.        Preliminary findings werediscussed with Dr. THEODORE 9/27/2024 8:13 PM by   Dr. Juarez Gross M.D. with read back confirmation.    A callback was requested.    --- End of Report ---    ***Please see the addendum at the top of this report. It may contain   additional important information or changes.****      JUAREZ GROSS MD; Resident Radiologist  This document has been electronically signed.  MERON BELLO MD; Attending Interventional Radiologist  This document has been electronically signed. Sep 27 2024  9:39PM  1st Addendum: MERON BELLO MD; Attending Interventional Radiologist  The first addendum was electronically signed on: Sep 27 2024 10:02PM. (09-27-24 @ 17:11)    CARDIOLOGY TESTING  12 Lead ECG:   Ventricular Rate 105 BPM  Atrial Rate 105 BPM  P-R Interval 148 ms  QRS Duration 100 ms  Q-T Interval 352 ms  QTC Calculation(Bazett) 465 ms  P Axis 60 degrees  R Axis 19 degrees  T Axis 147 degrees    Diagnosis Line Sinus tachycardia  ST & T wave abnormality, consider lateral ischemia  Abnormal ECG    Confirmed by DL ALSTON, [facility] ([id number]) on 9/27/2024 11:21:08 PM (09-27-24 @ 10:41)    12 Lead ECG:   Ventricular Rate 78 BPM  Atrial Rate 78 BPM  P-R Interval 156 ms  QRS Duration 102 ms  Q-T Interval 414 ms  QTC Calculation(Bazett) 471 ms  P Axis 53 degrees  R Axis -35 degrees  T Axis 69 degrees    Diagnosis Line Normal sinus rhythm  Left axis deviation  Anteroseptal infarct , age undetermined  Abnormal ECG    Confirmed by James Chairez ([id number]) on 9/17/2024 3:48:15 PM (09-17-24 @ 04:59)    ASSESSMENT/PLAN:  The patient is a 77 y/o male w/pmhx of HTN, HLD, 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  Patient ate out on 9/6 and began to experience abdominal pain and diarrhea. Denies any vomiting but endorses nausea, notes having 30-40 loose stools over the past week and over the past day he's had 3-5 loose stools. No melena, no hematochezia, describes stools as watery and filled with mucus. No prior history of this in the past, last colonoscopy was 8 years ago and was normal per patient. Notes 8/10 epigastric and periumbilical pain. Decreased appetite due to pain and diarrhea. No fevers, no chills, no sick contacts, does not endorse eating anything out of the ordinary, does not recall his meal prior to the onset of symptoms. No cp, sob, no loc, no acute vision or hearing changes. No other ros symptoms reported.     #Bladder Cancer with liver and pulmonary mets  #transaminitis  #Hematuria in setting of bladder Mass  - Dense irregularity within the urinary bladder measuring up to 5.3 cm   - MR abdomen --> Enlarged heterogeneous liver with innumerable bilobar hepatic masses, suspicious for metastatic disease. Multiple subcentimeter and mildly enlarged upper abdominal lymph nodes, as detailed above, suspicious for lymph node involvement. Numerous foci of osseous metastasis, as detailed above. Partially imaged 3.9 cm enhancing lobulated soft tissue mass within the urinary bladder, suspicious for neoplasm.  concerning for an underlying lesion. No hydronephrosis.  - CTA chest - 8.3 mm nodule is noted in left upper lobe adjacent to the vessels.  -Pt s/p biopsy with IR 9/23: remarkable for cytopathology consistent w/ bladder cancer  - CT a/p showing cirrhotic morphology - suspect metastasis from bladder tumor    - RUQ US: cirrhotic liver  - t bili 2.6 --> 2.1 --> 3.1--> 4.3  -  ALT 61  - lipase 81  - hepatitis panel negative  - GI PCR panel negative, C. Diff negative  - AFB ngtd, bcx ngtd, ua negative   - Not on antibx  - Hepatology consult noted  - CA 19-9 +ve, AFP -ve, HIV -ve, IgM 27  - CLD workup neg   -oxycodone 5mg q6hr added for pain control s/p biopsy   -Heme-onc aware. F/u recs   - urology evaluated - o/p f/u  -Pt to undergo treatment w/ heme-onc per family decision    diarrhea (resolved)  -pt has history of fentanyl patch use - could be accounting for diarrhea with w/d diarrhea  -pt states two weeks when abdominal pain and diarrhea started, he had shrimp/seafood from super market     #tachypnea   -pt was tachycardic this AM  - CTA chest inconclusive for PE  - EKG obtained: showed sinus tach  -pt desaturated to 90% on RA. NC started     #dysuria  Pt complaints of burning with urination   -UA +ketones, protein, small LE, blood - unchanged from prior    #urinary urgency  -has frequent urge to urinate but unable to urinate large volume  -c/w flomax     #hematuria  -MRI Abd 9/19 neg for hydro  -f/u uro consult     #kanwal, HAGMA (resolved)  -suspect HAGMA in setting of KANWAL  cr 1.1 today  - kanwal likely pre-renal from dehydration     #HTN  -currently on no meds- BP WNL   - hold ACE in setting of KANWAL    #HLD  - hold statin due to elevated LFTs    #Nephrolithiasis   - notes passing kidney stones in past and had one recently     #Supportive Management:  Dispo:  Home vs SNF  DVT Ppx: GI Ppx: Diet:  Diet, Regular:   Fiber/Residue Restricted (LOWFIBER)  DASH/TLC Sodium & Cholesterol Restricted (DASH) (09-20-24 @ 10:47) [Active]      Total time spent to complete patient's bedside assessment, review medical chart, discuss medical plan of care with covering medical team was more than 45 minutes with >50% of time spent face to face with patient, discussion with patient/family and/or coordination of care    Housestaff's notes reviewed. When there is confusion regarding the content or information provided in the notes of a housestaff (resident, medical student, physician assistant, or nurse practioner) and the attending physician notes, the attending physician's note takes precedence and supersedes the other notes.    Atif Barrientos MD/Patti  Attending Physician

## 2024-09-29 NOTE — PROGRESS NOTE ADULT - ASSESSMENT
The patient is a 78 y/o male with HTN, HLD, hx of frequent nephrolithiases, and recent passage of a stone a few days ago w/trace hematuria, cervical neck arthritis previous use of opioid pain medication, presenting for 10 days of diarrhea, abdominal pain. Notes having 30-40 loose stools over the past week and over the past day he's had 3-5 loose stools.     #Metastatic high grade urothelial cancer  #Innumerable bilobar hepatic masses,   #Mildly enlarged upper abdominal lymph nodes  #Foci of osseous metastasis  -Presented with c/o diarrhea   -CT A/p IMPRESSION: Dense irregularity within the urinary bladder measuring up to 5.3 cm   concerning for an underlying lesion. No hydronephrosis. Prominent retroperitoneal lymph nodes measuring up to 1 cm. Cirrhotic morphology of the liver. Hepatic steatosis. 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   49 , AFP 2.4,   Evaluated by urology, Pt will need to F/U with Dr. Caldwell for further  management of bladder mass.  MR 9.24: Enlarged heterogeneous liver with innumerable bilobar hepatic masses, suspicious for metastatic disease. Multiple subcentimeter and mildly enlarged upper abdominal lymph nodes, as detailed above, suspicious for lymph node involvement. Numerous foci of osseous metastases within the sacrum, bilateral iliac bones and several vertebral bodies. Partially imaged 3.9 cm enhancing lobulated soft tissue mass within the urinary bladder, suspicious for neoplasm.  Liver biopsy showed:   - Metastatic high grade carcinoma, poorly differentiated with urothelial and neuroendocrine features. See comment.      #Leucocytosis  #Normocytic anemia   K/L 2.2 K 2.13, L 0.97     # Worsening bilirubinemia/transaminitis:  Likely d/t liver mets    # Hematuria:  Urology following  Having difficulty voiding today, difficult [place] insertion    Recommendations:   - We had recommended Hospice during our prior visit, however he is not ready for Hospice yet. He again confirms that he wants to try chemotherapy, side effects discussed and consent received.  - Given his comorbidities, hyperbilirubinemia, he is at risk of increased toxicities and including death from chemotherapy.  - S/P D1 of  Gemcitabine 650 mg/m2 and Carboplatin AUC 4 (dose reduced), given his elevated bilirubin, on 9/28, due for next dose of Gemcitabine on 10/4/24  The patient is a 78 y/o male with HTN, HLD, hx of frequent nephrolithiases, and recent passage of a stone a few days ago w/trace hematuria, cervical neck arthritis previous use of opioid pain medication, presenting for 10 days of diarrhea, abdominal pain. Notes having 30-40 loose stools over the past week and over the past day he's had 3-5 loose stools.     #Metastatic high grade urothelial cancer  #Innumerable bilobar hepatic masses,   #Mildly enlarged upper abdominal lymph nodes  #Foci of osseous metastasis  -Presented with c/o diarrhea   -CT A/p IMPRESSION: Dense irregularity within the urinary bladder measuring up to 5.3 cm   concerning for an underlying lesion. No hydronephrosis. Prominent retroperitoneal lymph nodes measuring up to 1 cm. Cirrhotic morphology of the liver. Hepatic steatosis. 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   49 , AFP 2.4,   Evaluated by urology, Pt will need to F/U with Dr. Caldwell for further  management of bladder mass.  MR 9.24: Enlarged heterogeneous liver with innumerable bilobar hepatic masses, suspicious for metastatic disease. Multiple subcentimeter and mildly enlarged upper abdominal lymph nodes, as detailed above, suspicious for lymph node involvement. Numerous foci of osseous metastases within the sacrum, bilateral iliac bones and several vertebral bodies. Partially imaged 3.9 cm enhancing lobulated soft tissue mass within the urinary bladder, suspicious for neoplasm.  Liver biopsy showed:   - Metastatic high grade carcinoma, poorly differentiated with urothelial and neuroendocrine features. See comment.      #Leucocytosis  #Normocytic anemia   K/L 2.2 K 2.13, L 0.97     # Worsening bilirubinemia/transaminitis:  Likely d/t liver mets    # Hematuria:  Urology following  Having difficulty voiding today, difficult [place] insertion    Recommendations:   - We had recommended Hospice during our prior visit, however he is not ready for Hospice yet. He again confirms that he wants to try chemotherapy, side effects discussed and consent received.  - Given his comorbidities, hyperbilirubinemia, he is at risk of increased toxicities and including death from chemotherapy.  - S/P D1 of  Gemcitabine 650 mg/m2 and Carboplatin AUC 4 (dose reduced), given his elevated bilirubin, on 9/28, due for next dose of Gemcitabine on 10/4/24. He tolerated D1 quite well without particular complaints or complications.

## 2024-09-29 NOTE — PROGRESS NOTE ADULT - ATTENDING COMMENTS
Medicine Attending Addendum  Patient was seen and examined with medicine team.  Nursing records reviewed. I agree with the resident/PA/NP's note including past medical history, home medications, social history, allergies, surgical history, family history, and review of system. I have reviewed relevant vitals, laboratory values, imaging studies, and microbiology.   - Above resident's note was edited by me  - Desat overnight. CTA from prior had showed right pleural effusion with atelectasis in setting of lung mets- likely causing desaturation. Will do a trial of Lasix.  - US Doppler showing Baker cyst. Consider draining it if bothersome  - s/p chemo yesterday; tolerate well. due for next dose of Gemcitabine on 10/4/24   - Overall, poor prognosis  - rest of A/P as per detailed housestaff note above except above modifications    Total time spent to complete patient's bedside assessment, reviewed medical chart, discussed medical plan of care with team was more than 35 minutes with >50% of time spent face to face with patient, discussion with patient/family and/or coordination of care. Medicine Attending Addendum  Patient was seen and examined with medicine team.  Nursing records reviewed. I agree with the resident/PA/NP's note including past medical history, home medications, social history, allergies, surgical history, family history, and review of system. I have reviewed relevant vitals, laboratory values, imaging studies, and microbiology.   - Above resident's note was edited by me  - Desat overnight. CTA from prior had showed right pleural effusion with atelectasis in setting of lung mets- likely causing desaturation. Will do a trial of Lasix.  - US Doppler showing Baker cyst. Consider draining it if bothersome  - s/p chemo yesterday; tolerate well. due for next dose of Gemcitabine on 10/4/24   - hgb stabilizes now; can resume AC  - Overall, poor prognosis  - rest of A/P as per detailed housestaff note above except above modifications    Total time spent to complete patient's bedside assessment, reviewed medical chart, discussed medical plan of care with team was more than 35 minutes with >50% of time spent face to face with patient, discussion with patient/family and/or coordination of care.

## 2024-09-29 NOTE — PROGRESS NOTE ADULT - ASSESSMENT
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  Patient ate out on 9/6 and began to experience abdominal pain and diarrhea. Denies any vomiting but endorses nausea, notes having 30-40 loose stools over the past week and over the past day he's had 3-5 loose stools. No melena, no hematochezia, describes stools as watery and filled with mucus. No prior history of this in the past, last colonoscopy was 8 years ago and was normal per patient. Notes 8/10 epigastric and periumbilical pain. Decreased appetite due to pain and diarrhea. No fevers, no chills, no sick contacts, does not endorse eating anything out of the ordinary, does not recall his meal prior to the onset of symptoms. No cp, sob, no loc, no acute vision or hearing changes. No other ros symptoms reported.     #bladder mass  - Dense irregularity within the urinary bladder measuring up to 5.3 cm   - MR abdomen --> Enlarged heterogeneous liver with innumerable bilobar hepatic masses, suspicious for metastatic disease. Multiple subcentimeter and mildly enlarged upper abdominal lymph nodes, as detailed above, suspicious for lymph node involvement. Numerous foci of osseous metastasis, as detailed above. Partially imaged 3.9 cm enhancing lobulated soft tissue mass within the urinary bladder, suspicious for neoplasm.  concerning for an underlying lesion. No hydronephrosis.  -Pt s/p biopsy with IR 9/23: remarkable for cytopathology consistent w/ bladder cancer      -Heme-onc aware.- S/P D1 of  Gemcitabine 650 mg/m2 and Carboplatin AUC 4 (dose reduced), given his elevated bilirubin, on 9/28, due for next dose of Gemcitabine on 10/4/24   - urology evaluated - o/p f/u  -Pt to undergo treatment w/ heme-onc per family decision    #?Liver mets   #transaminitis  diarrhea (resolved)  -pt has history of fentanyl patch use - could be accounting for diarrhea with w/d diarrhea  -pt states two weeks when abdominal pain and diarrhea started, he had shrimp/seafood from [email or web address] market  - CT a/p showing cirrhotic morphology - suspect metastasis from bladder tumor    - RUQ US: cirrhotic liver  - t bili 2.6 --> 2.1 --> 3.1--> 4.3  -  ALT 61  - lipase 81  - hepatitis panel negative  - GI PCR panel negative, C. Diff negative  - AFB ngtd, bcx ngtd, ua negative   - Not on antibx  - Hepatology consult noted  - CA 19-9 +ve, AFP -ve, HIV -ve, IgM 27  - CLD workup neg   -oxycodone 5mg q6hr added for pain control s/p biopsy     #tachypnea   -pt was tachycardic 9/26  -EKG obtained: showed sinus tach  -pt desaturated to 90% on RA.  Further desturated 9/29. On 4L NC   -CXR (9/28): Increasing right basilar opacity/effusion and interstitial opacities.        -F/u repeat today    - d-dimer 1119  -V/q scan inclusive for PE. ?subsegmental PE     -heparin subQ restarted today     #dysuria  Pt complaints of burning with urination   -UA +ketones, protein, small LE, blood - unchanged from prior    #urinary urgency  -has frequent urge to urinate but unable to urinate large volume  -c/w flomax     #hematuria  -MRI Abd 9/19 neg for hydro  Uro: Recommend OP f/u w/  for cystoscopy and further hematuria w/u when medically optimal      #kanwal, HAGMA (resolved)  -suspect HAGMA in setting of KANWAL  cr 1.1 today  - kanwal likely pre-renal from dehydration     #HTN  -currently on no meds- BP WNL   - hold ACE in setting of KANWAL    #HLD  - hold statin due to elevated LFTs    #Nephrolithiasis   - notes passing kidney stones in past and had one recently     DVT proph: heparin   GI ppx - no ppi  Diet: regular, low fiber   discussed GOC: full code

## 2024-09-29 NOTE — PROGRESS NOTE ADULT - SUBJECTIVE AND OBJECTIVE BOX
SUBJECTIVE/OVERNIGHT EVENTS  Today is hospital day 13d. This morning patient was seen and examined at bedside, resting comfortably in bed. No acute or major events overnight.    HOSPITAL COURSE  Day 1:   Day 2:   Day 3:     CODE STATUS:    FAMILY COMMUNICATION  Contact date:  Name of person contacted:  Relationship to patient:  Communication details:    MEDICATIONS  STANDING MEDICATIONS  chlorhexidine 2% Cloths 1 Application(s) Topical daily  lactated ringers. 1000 milliLiter(s) IV Continuous <Continuous>  lactated ringers. 1000 milliLiter(s) IV Continuous <Continuous>  lactobacillus acidophilus 1 Tablet(s) Oral daily  polyethylene glycol 3350 17 Gram(s) Oral every 24 hours  senna 2 Tablet(s) Oral at bedtime  tamsulosin 0.4 milliGRAM(s) Oral daily    PRN MEDICATIONS  acetaminophen     Tablet .. 650 milliGRAM(s) Oral every 6 hours PRN  loperamide 2 milliGRAM(s) Oral every 6 hours PRN  oxyCODONE    IR 5 milliGRAM(s) Oral every 6 hours PRN    VITALS  T(F): 97.5 (09-29-24 @ 05:22), Max: 98.4 (09-28-24 @ 13:29)  HR: 83 (09-29-24 @ 07:52) (83 - 98)  BP: 149/65 (09-29-24 @ 05:22) (109/55 - 149/65)  RR: 18 (09-29-24 @ 07:52) (18 - 18)  SpO2: 93% (09-29-24 @ 07:52) (90% - 96%)    PHYSICAL EXAM  GENERAL  (  ) NAD, lying in bed comfortably     (  ) obtunded     (  ) lethargic     (  ) somnolent    HEAD  (  ) Atraumatic     (  ) hematoma     (  ) laceration (specify location:       )     NECK  (  ) Supple     (  ) neck stiffness     (  ) nuchal rigidity     (  )  no JVD     (  ) JVD present ( -- cm)    HEART  Rate -->  (  ) normal rate    (  ) bradycardic    (  ) tachycardic  Rhythm -->  (  ) regular    (  ) regularly irregular    (  ) irregularly irregular  Murmurs -->  (  ) normal s1/s2    (  ) systolic murmur    (  ) diastolic murmur    (  ) continuous murmur     (  ) S3 present    (  ) S4 present    LUNGS  (  )Unlabored respirations     (  ) tachypnea  (  ) B/L air entry     (  ) decreased breath sounds in:  (location     )    (  ) no adventitious sound     (  ) crackles     (  ) wheezing      (  ) rhonchi      (specify location:       )  (  ) chest wall tenderness (specify location:       )    ABDOMEN  (  ) Soft     (  ) tense   |   (  ) nondistended     (  ) distended   |   (  ) +BS     (  ) hypoactive bowel sounds     (  ) hyperactive bowel sounds  (  ) nontender     (  ) RUQ tenderness     (  ) RLQ tenderness     (  ) LLQ tenderness     (  ) epigastric tenderness     (  ) diffuse tenderness  (  ) Splenomegaly      (  ) Hepatomegaly      (  ) Jaundice     (  ) ecchymosis     EXTREMITIES  (  ) Normal     (  ) Rash     (  ) ecchymosis     (  ) varicose veins      (  ) pitting edema     (  ) non-pitting edema   (  ) ulceration     (  ) gangrene:     (location:     )    NERVOUS SYSTEM  (  ) A&Ox3     (  ) confused     (  ) lethargic  CN II-XII:     (  ) Intact     (  ) focal deficits  (Specify:     )   Upper extremities:     (  ) strength X/5     (  ) focal deficit (specify:    )  Lower extremities:     (  ) strength  X/5    (  ) focal deficit (specify:    )    SKIN  (  ) No rashes or lesions     (  ) maculopapular rash     (  ) pustules     (  ) vesicles     (  ) ulcer     (  ) ecchymosis     (specify location:     )    (  ) Indwelling [place] Catheter   Date insterted:    Reason (  ) Critical illness     (  ) urinary retention    (  ) Accurate Ins/Outs Monitoring     (  ) CMO patient    (  ) Central Line  Date inserted:  Location: (  ) Right IJ   (  ) Left IJ   (  ) Right Fem   (  ) Left Fem    (  ) SPC  (  ) pigtail  (  ) PEG tube  (  ) colostomy  (  ) jejunostomy  (  ) U-Dall    LABS             10.1   14.39 )-----------( 251      ( 09-29-24 @ 05:41 )             28.5     144  |  107  |  24  -------------------------<  142   09-29-24 @ 05:41  4.1  |  20  |  0.9    Ca      8.6     09-29-24 @ 05:41  Mg     1.8     09-29-24 @ 05:41    TPro  4.5  /  Alb  2.6  /  TBili  5.5  /  DBili  x   /  AST  414  /  ALT  92  /  AlkPhos  812  /  GGT  x     09-29-24 @ 05:41    PT/INR - ( 09-27-24 @ 21:25 )   PT: 13.40 sec[H];   INR: 1.17 ratio  PTT - ( 09-28-24 @ 23:04 )  PTT:29.9 sec      Urinalysis Basic - ( 29 Sep 2024 05:41 )    Color: x / Appearance: x / SG: x / pH: x  Gluc: 142 mg/dL / Ketone: x  / Bili: x / Urobili: x   Blood: x / Protein: x / Nitrite: x   Leuk Esterase: x / RBC: x / WBC x   Sq Epi: x / Non Sq Epi: x / Bacteria: x          IMAGING SUBJECTIVE/OVERNIGHT EVENTS  Today is hospital day 13d. This morning patient was seen and examined at bedside, resting comfortably in bed.  Pt desaturated somewhat overnight to88%. Currently on 4L NC.   Pt states he has somewhat shallow breathing but not uncomfortable  He further states that his urinary urgency is improved    CODE STATUS: full code     FAMILY COMMUNICATION  Contact date:  Name of person contacted:  Relationship to patient:  Communication details:    MEDICATIONS  STANDING MEDICATIONS  chlorhexidine 2% Cloths 1 Application(s) Topical daily  lactated ringers. 1000 milliLiter(s) IV Continuous <Continuous>  lactated ringers. 1000 milliLiter(s) IV Continuous <Continuous>  lactobacillus acidophilus 1 Tablet(s) Oral daily  polyethylene glycol 3350 17 Gram(s) Oral every 24 hours  senna 2 Tablet(s) Oral at bedtime  tamsulosin 0.4 milliGRAM(s) Oral daily    PRN MEDICATIONS  acetaminophen     Tablet .. 650 milliGRAM(s) Oral every 6 hours PRN  loperamide 2 milliGRAM(s) Oral every 6 hours PRN  oxyCODONE    IR 5 milliGRAM(s) Oral every 6 hours PRN    VITALS  T(F): 97.5 (09-29-24 @ 05:22), Max: 98.4 (09-28-24 @ 13:29)  HR: 83 (09-29-24 @ 07:52) (83 - 98)  BP: 149/65 (09-29-24 @ 05:22) (109/55 - 149/65)  RR: 18 (09-29-24 @ 07:52) (18 - 18)  SpO2: 93% (09-29-24 @ 07:52) (90% - 96%)    PHYSICAL EXAM  GENERAL  ( x ) NAD, lying in bed comfortably     (  ) obtunded     (  ) lethargic     (  ) somnolent    HEAD  (x  ) Atraumatic     (  ) hematoma     (  ) laceration (specify location:       )     NECK  (x) Supple     (  ) neck stiffness     (  ) nuchal rigidity     (  )  no JVD     (  ) JVD present ( -- cm)    HEART  Rate -->  (x  ) normal rate    (  ) bradycardic    (  ) tachycardic  Rhythm -->  (x  ) regular    (  ) regularly irregular    (  ) irregularly irregular  Murmurs -->  ( x ) normal s1/s2    (  ) systolic murmur    (  ) diastolic murmur    (  ) continuous murmur     (  ) S3 present    (  ) S4 present    LUNGS  ( x )Unlabored respirations     (  ) tachypnea  ( x ) B/L air entry     (  ) decreased breath sounds in:  (location     )    ( x ) no adventitious sound     (  ) crackles     (  ) wheezing      (  ) rhonchi      (specify location:       )  (  ) chest wall tenderness (specify location:       )    ABDOMEN  (  x) Soft     (  ) tense   |   (  ) nondistended     (x  ) distended   |   (  ) +BS     (  ) hypoactive bowel sounds     (  ) hyperactive bowel sounds  (x  ) nontender     (  ) RUQ tenderness     (  ) RLQ tenderness     (  ) LLQ tenderness     (  ) epigastric tenderness     (  ) diffuse tenderness  x(  ) Splenomegaly      (  ) Hepatomegaly      (  ) Jaundice     (  ) ecchymosis     EXTREMITIES  (x  ) Normal     (  ) Rash     (  ) ecchymosis     (  ) varicose veins      (  ) pitting edema     (  ) non-pitting edema   (  ) ulceration     (  ) gangrene:     (location:     )    NERVOUS SYSTEM  (x  ) A&Ox3     (  ) confused     (  ) lethargic  CN II-XII:     (  ) Intact     (  ) focal deficits  (Specify:     )   Upper extremities:     (  ) strength X/5     (  ) focal deficit (specify:    )  Lower extremities:     (  ) strength  X/5    (  ) focal deficit (specify:    )    SKIN  ( x ) No rashes or lesions     (  ) maculopapular rash     (  ) pustules     (  ) vesicles     (  ) ulcer     (  ) ecchymosis     (specify location:     )    (  ) Indwelling [place] Catheter   Date insterted:    Reason (  ) Critical illness     (  ) urinary retention    (  ) Accurate Ins/Outs Monitoring     (  ) CMO patient    (  ) Central Line  Date inserted:  Location: (  ) Right IJ   (  ) Left IJ   (  ) Right Fem   (  ) Left Fem    (  ) SPC  (  ) pigtail  (  ) PEG tube  (  ) colostomy  (  ) jejunostomy  (  ) U-Dall    LABS             10.1   14.39 )-----------( 251      ( 09-29-24 @ 05:41 )             28.5     144  |  107  |  24  -------------------------<  142   09-29-24 @ 05:41  4.1  |  20  |  0.9    Ca      8.6     09-29-24 @ 05:41  Mg     1.8     09-29-24 @ 05:41    TPro  4.5  /  Alb  2.6  /  TBili  5.5  /  DBili  x   /  AST  414  /  ALT  92  /  AlkPhos  812  /  GGT  x     09-29-24 @ 05:41    PT/INR - ( 09-27-24 @ 21:25 )   PT: 13.40 sec[H];   INR: 1.17 ratio  PTT - ( 09-28-24 @ 23:04 )  PTT:29.9 sec      Urinalysis Basic - ( 29 Sep 2024 05:41 )    Color: x / Appearance: x / SG: x / pH: x  Gluc: 142 mg/dL / Ketone: x  / Bili: x / Urobili: x   Blood: x / Protein: x / Nitrite: x   Leuk Esterase: x / RBC: x / WBC x   Sq Epi: x / Non Sq Epi: x / Bacteria: x          IMAGING

## 2024-09-29 NOTE — PROGRESS NOTE ADULT - SUBJECTIVE AND OBJECTIVE BOX
NATHANAEL MCCLENDON 79y Male  MRN#: [id number]   Hospital Day: 13d    SUBJECTIVE  Patient is a 79y old Male who presents with a chief complaint of diarrhea (28 Sep 2024 11:05)  Currently admitted to medicine with the primary diagnosis of Abdominal pain      INTERVAL HPI AND OVERNIGHT EVENTS:  Patient was examined and seen at bedside. This morning he is resting comfortably in bed and reports no issues or overnight events.    REVIEW OF SYMPTOMS:  CONSTITUTIONAL: No weakness, fevers or chills; No headaches  EYES: No visual changes, eye pain, or discharge  ENT: No vertigo; No ear pain or change in hearing; No sore throat or difficulty swallowing  NECK: No pain or stiffness  RESPIRATORY: No cough, wheezing, or hemoptysis; No shortness of breath  CARDIOVASCULAR: No chest pain or palpitations  GASTROINTESTINAL: No abdominal or epigastric pain; No nausea, vomiting, or hematemesis; No diarrhea or constipation; No melena or hematochezia  GENITOURINARY: No dysuria, frequency or hematuria  MUSCULOSKELETAL: No joint pain, no muscle pain, no weakness  NEUROLOGICAL: No numbness or weakness  SKIN: No itching or rashes    OBJECTIVE  PAST MEDICAL & SURGICAL HISTORY  HTN (hypertension)    HLD (hyperlipidemia)    Kidney stone    No significant past surgical history      ALLERGIES:  No Known Allergies    MEDICATIONS:  STANDING MEDICATIONS  chlorhexidine 2% Cloths 1 Application(s) Topical daily  lactated ringers. 1000 milliLiter(s) IV Continuous <Continuous>  lactated ringers. 1000 milliLiter(s) IV Continuous <Continuous>  lactobacillus acidophilus 1 Tablet(s) Oral daily  polyethylene glycol 3350 17 Gram(s) Oral every 24 hours  senna 2 Tablet(s) Oral at bedtime  tamsulosin 0.4 milliGRAM(s) Oral daily    PRN MEDICATIONS  acetaminophen     Tablet .. 650 milliGRAM(s) Oral every 6 hours PRN  loperamide 2 milliGRAM(s) Oral every 6 hours PRN  oxyCODONE    IR 5 milliGRAM(s) Oral every 6 hours PRN      VITAL SIGNS: Last 24 Hours  T(C): 36.4 (29 Sep 2024 05:22), Max: 36.9 (28 Sep 2024 13:29)  T(F): 97.5 (29 Sep 2024 05:22), Max: 98.4 (28 Sep 2024 13:29)  HR: 83 (29 Sep 2024 07:52) (83 - 98)  BP: 149/65 (29 Sep 2024 05:22) (109/55 - 149/65)  BP(mean): --  RR: 18 (29 Sep 2024 07:52) (18 - 18)  SpO2: 93% (29 Sep 2024 07:52) (90% - 96%)    LABS:                        10.1   14.39 )-----------( 251      ( 29 Sep 2024 05:41 )             28.5     09-29    144  |  107  |  24[H]  ----------------------------<  142[H]  4.1   |  20  |  0.9    Ca    8.6      29 Sep 2024 05:41  Mg     1.8     09-29    TPro  4.5[L]  /  Alb  2.6[L]  /  TBili  5.5[H]  /  DBili  x   /  AST  414[H]  /  ALT  92[H]  /  AlkPhos  812[H]  09-29    PT/INR - ( 27 Sep 2024 21:25 )   PT: 13.40 sec;   INR: 1.17 ratio         PTT - ( 28 Sep 2024 23:04 )  PTT:29.9 sec  Urinalysis Basic - ( 29 Sep 2024 05:41 )    Color: x / Appearance: x / SG: x / pH: x  Gluc: 142 mg/dL / Ketone: x  / Bili: x / Urobili: x   Blood: x / Protein: x / Nitrite: x   Leuk Esterase: x / RBC: x / WBC x   Sq Epi: x / Non Sq Epi: x / Bacteria: x                RADIOLOGY:      PHYSICAL EXAM:  CONSTITUTIONAL: No acute distress, well-developed, well-groomed, AAOx3  HEAD: Atraumatic, normocephalic  EYES: EOM intact, PERRLA, conjunctiva and sclera clear  ENT: Supple, no masses, no thyromegaly, no bruits, no JVD; moist mucous membranes  PULMONARY: Clear to auscultation bilaterally; no wheezes, rales, or rhonchi  CARDIOVASCULAR: Regular rate and rhythm; no murmurs, rubs, or gallops  GASTROINTESTINAL: Soft, non-tender, non-distended; bowel sounds present  MUSCULOSKELETAL: 2+ peripheral pulses; no clubbing, no cyanosis, no edema  NEUROLOGY: non-focal  SKIN: No rashes or lesions; warm and dry     NATHANAEL MCCLENDON 79y Male  MRN#: [id number]   Hospital Day: 13d    SUBJECTIVE  Patient is a 79y old Male who presented with a chief complaint of diarrhea (28 Sep 2024 11:05)  Currently admitted to medicine with the primary diagnosis of Abdominal pain      INTERVAL HPI AND OVERNIGHT EVENTS:  Patient was examined and seen at bedside. This morning he is resting comfortably in bed and reports no issues or overnight events.    REVIEW OF SYMPTOMS:  CONSTITUTIONAL: No weakness, fevers or chills; No headaches  EYES: No visual changes, eye pain, or discharge  ENT: No vertigo; No ear pain or change in hearing; No sore throat or difficulty swallowing  NECK: No pain or stiffness  RESPIRATORY: No cough, wheezing, or hemoptysis; No shortness of breath  CARDIOVASCULAR: No chest pain or palpitations  GASTROINTESTINAL: No abdominal or epigastric pain; No nausea, vomiting, or hematemesis; No diarrhea or constipation; No melena or hematochezia  GENITOURINARY: No dysuria, frequency or hematuria  MUSCULOSKELETAL: No joint pain, no muscle pain, no weakness  NEUROLOGICAL: No numbness or weakness  SKIN: No itching or rashes    OBJECTIVE  PAST MEDICAL & SURGICAL HISTORY  HTN (hypertension)    HLD (hyperlipidemia)    Kidney stone    No significant past surgical history      ALLERGIES:  No Known Allergies    MEDICATIONS:  STANDING MEDICATIONS  chlorhexidine 2% Cloths 1 Application(s) Topical daily  lactated ringers. 1000 milliLiter(s) IV Continuous <Continuous>  lactated ringers. 1000 milliLiter(s) IV Continuous <Continuous>  lactobacillus acidophilus 1 Tablet(s) Oral daily  polyethylene glycol 3350 17 Gram(s) Oral every 24 hours  senna 2 Tablet(s) Oral at bedtime  tamsulosin 0.4 milliGRAM(s) Oral daily    PRN MEDICATIONS  acetaminophen     Tablet .. 650 milliGRAM(s) Oral every 6 hours PRN  loperamide 2 milliGRAM(s) Oral every 6 hours PRN  oxyCODONE    IR 5 milliGRAM(s) Oral every 6 hours PRN      VITAL SIGNS: Last 24 Hours  T(C): 36.4 (29 Sep 2024 05:22), Max: 36.9 (28 Sep 2024 13:29)  T(F): 97.5 (29 Sep 2024 05:22), Max: 98.4 (28 Sep 2024 13:29)  HR: 83 (29 Sep 2024 07:52) (83 - 98)  BP: 149/65 (29 Sep 2024 05:22) (109/55 - 149/65)  BP(mean): --  RR: 18 (29 Sep 2024 07:52) (18 - 18)  SpO2: 93% (29 Sep 2024 07:52) (90% - 96%)    LABS:                        10.1   14.39 )-----------( 251      ( 29 Sep 2024 05:41 )             28.5     09-29    144  |  107  |  24[H]  ----------------------------<  142[H]  4.1   |  20  |  0.9    Ca    8.6      29 Sep 2024 05:41  Mg     1.8     09-29    TPro  4.5[L]  /  Alb  2.6[L]  /  TBili  5.5[H]  /  DBili  x   /  AST  414[H]  /  ALT  92[H]  /  AlkPhos  812[H]  09-29    PT/INR - ( 27 Sep 2024 21:25 )   PT: 13.40 sec;   INR: 1.17 ratio         PTT - ( 28 Sep 2024 23:04 )  PTT:29.9 sec  Urinalysis Basic - ( 29 Sep 2024 05:41 )    Color: x / Appearance: x / SG: x / pH: x  Gluc: 142 mg/dL / Ketone: x  / Bili: x / Urobili: x   Blood: x / Protein: x / Nitrite: x   Leuk Esterase: x / RBC: x / WBC x   Sq Epi: x / Non Sq Epi: x / Bacteria: x                RADIOLOGY:      PHYSICAL EXAM:  EYES: Sclerae clear, no icterus  ENT: Supple, no masses, no thyromegaly, no bruits, no JVD; moist mucous membranes  PULMONARY: Clear to auscultation bilaterally; no wheezes, rales, or rhonchi  CARDIOVASCULAR: Regular rate and rhythm; no murmurs, rubs, or gallops  GASTROINTESTINAL: Soft, non-tender, non-distended; bowel sounds present  MUSCULOSKELETAL: 2+ peripheral pulses; no clubbing, no cyanosis, no edema  NEUROLOGY: non-focal  SKIN: No rashes or lesions; warm and dry

## 2024-09-30 NOTE — PROGRESS NOTE ADULT - ATTENDING COMMENTS
Medicine Attending Addendum  Patient was seen and examined with medicine team.  Nursing records reviewed. I agree with the resident/PA/NP's note including past medical history, home medications, social history, allergies, surgical history, family history, and review of system. I have reviewed relevant vitals, laboratory values, imaging studies, and microbiology.   - Above resident's note was edited by me  - rest of A/P as per detailed housestaff note above except above modifications    Total time spent to complete patient's bedside assessment, reviewed medical chart, discussed medical plan of care with team was more than 35 minutes with >50% of time spent face to face with patient, discussion with patient/family and/or coordination of care.

## 2024-09-30 NOTE — PROGRESS NOTE ADULT - ASSESSMENT
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  Patient ate out on 9/6 and began to experience abdominal pain and diarrhea. Denies any vomiting but endorses nausea, notes having 30-40 loose stools over the past week and over the past day he's had 3-5 loose stools. No melena, no hematochezia, describes stools as watery and filled with mucus. No prior history of this in the past, last colonoscopy was 8 years ago and was normal per patient. Notes 8/10 epigastric and periumbilical pain. Decreased appetite due to pain and diarrhea. No fevers, no chills, no sick contacts, does not endorse eating anything out of the ordinary, does not recall his meal prior to the onset of symptoms. No cp, sob, no loc, no acute vision or hearing changes. No other ros symptoms reported.     #bladder mass  - Dense irregularity within the urinary bladder measuring up to 5.3 cm   - MR abdomen --> Enlarged heterogeneous liver with innumerable bilobar hepatic masses, suspicious for metastatic disease. Multiple subcentimeter and mildly enlarged upper abdominal lymph nodes, as detailed above, suspicious for lymph node involvement. Numerous foci of osseous metastasis, as detailed above. Partially imaged 3.9 cm enhancing lobulated soft tissue mass within the urinary bladder, suspicious for neoplasm.  concerning for an underlying lesion. No hydronephrosis.  -Pt s/p biopsy with IR 9/23: remarkable for cytopathology consistent w/ bladder cancer      -Heme-onc aware.- S/P D1 of  Gemcitabine 650 mg/m2 and Carboplatin AUC 4 (dose reduced), given his elevated bilirubin, on 9/28, due for next dose of Gemcitabine on 10/4/24   - urology evaluated - o/p f/u  -Pt to undergo treatment w/ heme-onc per family decision    #?Liver mets   #transaminitis  diarrhea (resolved)  -pt has history of fentanyl patch use - could be accounting for diarrhea with w/d diarrhea  -pt states two weeks when abdominal pain and diarrhea started, he had shrimp/seafood from [employer]  - CT a/p showing cirrhotic morphology - suspect metastasis from bladder tumor    - RUQ US: cirrhotic liver  - t bili 2.6 --> 2.1 --> 3.1--> 4.3  -  ALT 61  - lipase 81  - hepatitis panel negative  - GI PCR panel negative, C. Diff negative  - AFB ngtd, bcx ngtd, ua negative   - Not on antibx  - Hepatology consult noted  - CA 19-9 +ve, AFP -ve, HIV -ve, IgM 27  - CLD workup neg   -oxycodone 5mg q6hr added for pain control s/p biopsy     #tachypnea   -pt was tachycardic 9/26  -EKG obtained: showed sinus tach  -pt desaturated to 90% on RA.  Further desturated 9/29. On 4L NC   -CXR (9/28): Increasing right basilar opacity/effusion and interstitial opacities.        -F/u repeat today    - d-dimer 1119  -V/q scan inclusive for PE. ?subsegmental PE     -heparin subQ restarted  -lasix 40mg started today for pleural effusions on CXR    #dysuria  Pt complaints of burning with urination   -UA +ketones, protein, small LE, blood - unchanged from prior    #urinary urgency  -has frequent urge to urinate but unable to urinate large volume  -c/w flomax     #hematuria  -MRI Abd 9/19 neg for hydro  Uro: Recommend OP f/u w/  for cystoscopy and further hematuria w/u when medically optimal      #kanwal, HAGMA (resolved)  -suspect HAGMA in setting of KANWAL  cr 1.1 today  - kanwal likely pre-renal from dehydration     #HTN  -currently on no meds- BP WNL   - hold ACE in setting of KANWAL    #HLD  - hold statin due to elevated LFTs    #Nephrolithiasis   - notes passing kidney stones in past and had one recently     DVT proph: heparin   GI ppx - no ppi  Diet: regular, low fiber   discussed GOC: full code

## 2024-09-30 NOTE — PROGRESS NOTE ADULT - SUBJECTIVE AND OBJECTIVE BOX
SUBJECTIVE/OVERNIGHT EVENTS  Today is hospital day 14d. This morning patient was seen and examined at bedside, resting comfortably in bed. No acute or major events overnight.    Pt still complaining of urinary urgency. Otherwise denies any complaints    CODE STATUS: full code     FAMILY COMMUNICATION  Contact date:  Name of person contacted:  Relationship to patient:  Communication details:    MEDICATIONS  STANDING MEDICATIONS  chlorhexidine 2% Cloths 1 Application(s) Topical daily  furosemide   Injectable 40 milliGRAM(s) IV Push daily  heparin   Injectable 5000 Unit(s) SubCutaneous every 12 hours  lactobacillus acidophilus 1 Tablet(s) Oral daily  polyethylene glycol 3350 17 Gram(s) Oral every 24 hours  senna 2 Tablet(s) Oral at bedtime  tamsulosin 0.4 milliGRAM(s) Oral daily    PRN MEDICATIONS  acetaminophen     Tablet .. 650 milliGRAM(s) Oral every 6 hours PRN  loperamide 2 milliGRAM(s) Oral every 6 hours PRN  oxyCODONE    IR 5 milliGRAM(s) Oral every 6 hours PRN    VITALS  T(F): 98.1 (09-30-24 @ 20:18), Max: 98.1 (09-30-24 @ 20:18)  HR: 96 (09-30-24 @ 20:18) (81 - 96)  BP: 106/54 (09-30-24 @ 20:18) (106/54 - 114/61)  RR: 18 (09-30-24 @ 20:18) (18 - 18)  SpO2: 94% (09-30-24 @ 20:18) (94% - 99%)    PHYSICAL EXAM  GENERAL  (x  ) NAD, lying in bed comfortably     (  ) obtunded     (  ) lethargic     (  ) somnolent    HEAD  ( x ) Atraumatic     (  ) hematoma     (  ) laceration (specify location:       )     NECK  ( x ) Supple     (  ) neck stiffness     (  ) nuchal rigidity     (  )  no JVD     (  ) JVD present ( -- cm)    HEART  Rate -->  ( x ) normal rate    (  ) bradycardic    (  ) tachycardic  Rhythm -->  (  x) regular    (  ) regularly irregular    (  ) irregularly irregular  Murmurs -->  ( x ) normal s1/s2    (  ) systolic murmur    (  ) diastolic murmur    (  ) continuous murmur     (  ) S3 present    (  ) S4 present    LUNGS  (x  )Unlabored respirations     (  ) tachypnea  (x  ) B/L air entry     (  ) decreased breath sounds in:  (location     )    (x  ) no adventitious sound     (  ) crackles     (  ) wheezing      (  ) rhonchi      (specify location:       )  (  ) chest wall tenderness (specify location:       )    ABDOMEN  ( x ) Soft     (  ) tense   |   ( ) nondistended     ( x ) distended   |   (  ) +BS     (  ) hypoactive bowel sounds     (  ) hyperactive bowel sounds  (  ) nontender     (  ) RUQ tenderness     (  ) RLQ tenderness     (  ) LLQ tenderness     (  ) epigastric tenderness     (  ) diffuse tenderness  (x  ) Splenomegaly      (  ) Hepatomegaly      (  ) Jaundice     (  ) ecchymosis     EXTREMITIES  (x  ) Normal     (  ) Rash     (  ) ecchymosis     (  ) varicose veins      (  ) pitting edema     (  ) non-pitting edema   (  ) ulceration     (  ) gangrene:     (location:     )    NERVOUS SYSTEM  (x  ) A&Ox3     (  ) confused     (  ) lethargic  CN II-XII:     (  ) Intact     (  ) focal deficits  (Specify:     )   Upper extremities:     (  ) strength X/5     (  ) focal deficit (specify:    )  Lower extremities:     (  ) strength  X/5    (  ) focal deficit (specify:    )    SKIN  ( x ) No rashes or lesions     (  ) maculopapular rash     (  ) pustules     (  ) vesicles     (  ) ulcer     (  ) ecchymosis     (specify location:     )    (  ) Indwelling [place] Catheter   Date insterted:    Reason (  ) Critical illness     (  ) urinary retention    (  ) Accurate Ins/Outs Monitoring     (  ) CMO patient    (  ) Central Line  Date inserted:  Location: (  ) Right IJ   (  ) Left IJ   (  ) Right Fem   (  ) Left Fem    (  ) SPC  (  ) pigtail  (  ) PEG tube  (  ) colostomy  (  ) jejunostomy  (  ) U-Dall    LABS             10.6   14.85 )-----------( 256      ( 09-30-24 @ 09:25 )             29.9     141  |  103  |  30  -------------------------<  101   09-30-24 @ 09:25  3.9  |  23  |  1.1    Ca      8.6     09-30-24 @ 09:25  Mg     1.6     09-30-24 @ 09:25    TPro  4.8  /  Alb  2.9  /  TBili  6.7  /  DBili  x   /  AST  412  /  ALT  98  /  AlkPhos  874  /  GGT  x     09-30-24 @ 09:25    PTT - ( 09-28-24 @ 23:04 )  PTT:29.9 sec      Urinalysis Basic - ( 30 Sep 2024 09:25 )    Color: x / Appearance: x / SG: x / pH: x  Gluc: 101 mg/dL / Ketone: x  / Bili: x / Urobili: x   Blood: x / Protein: x / Nitrite: x   Leuk Esterase: x / RBC: x / WBC x   Sq Epi: x / Non Sq Epi: x / Bacteria: x          IMAGING

## 2024-10-01 NOTE — CONSULT NOTE ADULT - ASSESSMENT
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     Introduced palliative care to patient. He is aware that he has incurable cancer. He states that chemotherapy has gone well and he would like to continue with cancer directed therapy. He is accepting of his diagnosis and prognosis, and he hopes that he will be able to live a life that is free of pain. Discussed advance care planning. He would like trial of intubation but would not want to remain on ventilator long-term. He would like to be DNR. Discussed with patient's son, Josué, separately who is supportive of the above.     Plan:  - patient denies significant pain at this time  - GOC: prolong life and be comfortable    - agreeable to continued cancer directed therapy  - DNR, trial of intubation    - MOLST complete 10/1    >16 minutes spent F2F on advanced care planning with patient. Explained aspects of advanced care planning (code status, intubation).     Palliative care will continue to follow.   Please call [phone] with questions or concerns 24/7.   _____________  Joseph Nails MD  Palliative Medicine  [facility]   of Geriatric and Palliative Medicine  [phone]

## 2024-10-01 NOTE — PROGRESS NOTE ADULT - ASSESSMENT
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  Patient ate out on 9/6 and began to experience abdominal pain and diarrhea. Denies any vomiting but endorses nausea, notes having 30-40 loose stools over the past week and over the past day he's had 3-5 loose stools. No melena, no hematochezia, describes stools as watery and filled with mucus. No prior history of this in the past, last colonoscopy was 8 years ago and was normal per patient. Notes 8/10 epigastric and periumbilical pain. Decreased appetite due to pain and diarrhea. No fevers, no chills, no sick contacts, does not endorse eating anything out of the ordinary, does not recall his meal prior to the onset of symptoms. No cp, sob, no loc, no acute vision or hearing changes. No other ros symptoms reported.     #bladder mass  - Dense irregularity within the urinary bladder measuring up to 5.3 cm   - MR abdomen --> Enlarged heterogeneous liver with innumerable bilobar hepatic masses, suspicious for metastatic disease. Multiple subcentimeter and mildly enlarged upper abdominal lymph nodes, as detailed above, suspicious for lymph node involvement. Numerous foci of osseous metastasis, as detailed above. Partially imaged 3.9 cm enhancing lobulated soft tissue mass within the urinary bladder, suspicious for neoplasm.  concerning for an underlying lesion. No hydronephrosis.  -Pt s/p biopsy with IR 9/23: remarkable for cytopathology consistent w/ bladder cancer      -Heme-onc aware.- S/P D1 of  Gemcitabine 650 mg/m2 and Carboplatin AUC 4 (dose reduced), given his elevated bilirubin, on 9/28, due for next dose of Gemcitabine on 10/4/24   - urology evaluated - o/p f/u  -Pt to undergo treatment w/ heme-onc per family decision    #?Liver mets   #transaminitis  diarrhea (resolved)  -pt has history of fentanyl patch use - could be accounting for diarrhea with w/d diarrhea  -pt states two weeks when abdominal pain and diarrhea started, he had shrimp/seafood from [employer]  - CT a/p showing cirrhotic morphology - suspect metastasis from bladder tumor    - RUQ US: cirrhotic liver  - t bili 2.6 --> 2.1 --> 3.1--> 4.3  -  ALT 61  - lipase 81  - hepatitis panel negative  - GI PCR panel negative, C. Diff negative  - AFB ngtd, bcx ngtd, ua negative   - Not on antibx  - Hepatology consult noted  - CA 19-9 +ve, AFP -ve, HIV -ve, IgM 27  - CLD workup neg   -oxycodone 5mg q6hr added for pain control s/p biopsy     #tachypnea   -pt was tachycardic 9/26  -EKG obtained: showed sinus tach  -pt desaturated to 90% on RA.  Further desturated 9/29. On 4L NC   -CXR (9/28): Increasing right basilar opacity/effusion and interstitial opacities.       Repeat CXR (9/30):Right basilar opacity/pleural effusion, increased. Bilateral interstitial opacities, unchanged..  - d-dimer 1119  -V/q scan inclusive for PE. ?subsegmental PE     -heparin subQ restarted  -lasix 40mg started today for pleural effusions on CXR    #dysuria  Pt complaints of burning with urination   -UA +ketones, protein, small LE, blood - unchanged from prior    #urinary urgency  -has frequent urge to urinate but unable to urinate large volume  -c/w flomax     #hematuria  -MRI Abd 9/19 neg for hydro  Uro: Recommend OP f/u w/  for cystoscopy and further hematuria w/u when medically optimal      #kanwal, HAGMA (resolved)  -suspect HAGMA in setting of KANWAL  cr 1.1 today  - kanwal likely pre-renal from dehydration     #HTN  -currently on no meds- BP WNL   - hold ACE in setting of KANWAL    #HLD  - hold statin due to elevated LFTs    #Nephrolithiasis   - notes passing kidney stones in past and had one recently     DVT proph: heparin   GI ppx - no ppi  Diet: regular, low fiber   discussed GOC: full code        The patient is a 77 y/o male w/pmhx of HTN, HLD, 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  Patient ate out on 9/6 and began to experience abdominal pain and diarrhea. Denies any vomiting but endorses nausea, notes having 30-40 loose stools over the past week and over the past day he's had 3-5 loose stools. No melena, no hematochezia, describes stools as watery and filled with mucus. No prior history of this in the past, last colonoscopy was 8 years ago and was normal per patient. Notes 8/10 epigastric and periumbilical pain. Decreased appetite due to pain and diarrhea. No fevers, no chills, no sick contacts, does not endorse eating anything out of the ordinary, does not recall his meal prior to the onset of symptoms. No cp, sob, no loc, no acute vision or hearing changes. No other ros symptoms reported.     #bladder mass  - Dense irregularity within the urinary bladder measuring up to 5.3 cm   - MR abdomen --> Enlarged heterogeneous liver with innumerable bilobar hepatic masses, suspicious for metastatic disease. Multiple subcentimeter and mildly enlarged upper abdominal lymph nodes, as detailed above, suspicious for lymph node involvement. Numerous foci of osseous metastasis, as detailed above. Partially imaged 3.9 cm enhancing lobulated soft tissue mass within the urinary bladder, suspicious for neoplasm.  concerning for an underlying lesion. No hydronephrosis.  -Pt s/p biopsy with IR 9/23: remarkable for cytopathology consistent w/ bladder cancer      -Heme-onc aware.- S/P D1 of  Gemcitabine 650 mg/m2 and Carboplatin AUC 4 (dose reduced), given his elevated bilirubin, on 9/28, due for next dose of Gemcitabine on 10/4/24   - urology evaluated - o/p f/u  -Pt to undergo treatment w/ heme-onc per family decision    #?Liver mets   #transaminitis  diarrhea (resolved)  -pt has history of fentanyl patch use - could be accounting for diarrhea with w/d diarrhea  -pt states two weeks when abdominal pain and diarrhea started, he had shrimp/seafood from [employer]  - CT a/p showing cirrhotic morphology - suspect metastasis from bladder tumor    - RUQ US: cirrhotic liver  - t bili 2.6 --> 2.1 --> 3.1--> 4.3  -  ALT 61  - lipase 81  - hepatitis panel negative  - GI PCR panel negative, C. Diff negative  - AFB ngtd, bcx ngtd, ua negative   - Not on antibx  - Hepatology consult noted  - CA 19-9 +ve, AFP -ve, HIV -ve, IgM 27  - CLD workup neg   -oxycodone 5mg q6hr added for pain control s/p biopsy     #tachypnea   -pt was tachycardic 9/26  -EKG obtained: showed sinus tach  -pt desaturated to 90% on RA.  Further desturated 9/29. On 4L NC   -CXR (9/28): Increasing right basilar opacity/effusion and interstitial opacities.       Repeat CXR (9/30):Right basilar opacity/pleural effusion, increased. Bilateral interstitial opacities, unchanged..  - d-dimer 1119  -V/q scan inclusive for PE. ?subsegmental PE     -heparin subQ restarted  -lasix 40mg started today for pleural effusions on CXR    #dysuria  Pt complaints of burning with urination   -UA +ketones, protein, small LE, blood - unchanged from prior    #urinary urgency  -has frequent urge to urinate but unable to urinate large volume  -c/w flomax     #hematuria  -MRI Abd 9/19 neg for hydro  Uro: Recommend OP f/u w/  for cystoscopy and further hematuria w/u when medically optimal  -Pt had some hematuria today. - heparin d/c'ed. Uro aware.        #kanwal, HAGMA (resolved)  -suspect HAGMA in setting of KANWAL  cr 1.1 on 9/30  - kanwal likely pre-renal from dehydration   -will repeat renal U/S if uptrending     #HTN  -currently on no meds- BP WNL   - hold ACE in setting of KANWAL    #HLD  - hold statin due to elevated LFTs    #Nephrolithiasis   - notes passing kidney stones in past and had one recently     DVT proph: heparin   GI ppx - no ppi  Diet: regular, low fiber   discussed GOC: full code

## 2024-10-01 NOTE — PROGRESS NOTE ADULT - ATTENDING COMMENTS
Medicine Attending Addendum  Patient was seen and examined with medicine team.  Nursing records reviewed. I agree with the resident/PA/NP's note including past medical history, home medications, social history, allergies, surgical history, family history, and review of system. I have reviewed relevant vitals, laboratory values, imaging studies, and microbiology.   - Above resident's note was edited by me  - Poor prognosis  - rest of A/P as per detailed housestaff note above except above modifications    Total time spent to complete patient's bedside assessment, reviewed medical chart, discussed medical plan of care with team was more than 35 minutes with >50% of time spent face to face with patient, discussion with patient/family and/or coordination of care.

## 2024-10-01 NOTE — CONSULT NOTE ADULT - CONVERSATION DETAILS
Introduced palliative care to patient. He is aware that he has incurable cancer. He states that chemotherapy has gone well and he would like to continue with cancer directed therapy. He is accepting of his diagnosis and prognosis, and he hopes that he will be able to live a life that is free of pain. Discussed advance care planning. He would like trial of intubation but would not want to remain on ventilator long-term. He would like to be DNR. Discussed with patient's son, Josué, separately who is supportive of the above.

## 2024-10-01 NOTE — PHYSICAL THERAPY INITIAL EVALUATION ADULT - GENERAL OBSERVATIONS, REHAB EVAL
PT IE [id number]. Chart reviewed. Pt encountered semi reclined in bed. In NAD. + condom cath appeared to be dislodged. Nursing staff made aware.

## 2024-10-01 NOTE — PHYSICAL THERAPY INITIAL EVALUATION ADULT - PHYSICAL ASSIST/NONPHYSICAL ASSIST: SIT/SUPINE, REHAB EVAL
What Type Of Note Output Would You Prefer (Optional)?: Standard Output Is This A New Presentation, Or A Follow-Up?: Rash Additional History: Patient has a rash on her lower and middle back that is itchy and red and sometimes has blisters. verbal cues/1 person assist

## 2024-10-01 NOTE — PHYSICAL THERAPY INITIAL EVALUATION ADULT - PERTINENT HX OF CURRENT PROBLEM, REHAB EVAL
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  Patient ate out on 9/6 and began to experience abdominal pain and diarrhea.

## 2024-10-01 NOTE — CONSULT NOTE ADULT - SUBJECTIVE AND OBJECTIVE BOX
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  Patient ate out on 9/6 and began to experience abdominal pain and diarrhea. Denies any vomiting but endorses nausea, notes having 30-40 loose stools over the past week and over the past day he's had 3-5 loose stools. No melena, no hematochezia, describes stools as watery and filled with mucus. No prior history of this in the past, last colonoscopy was 8 years ago and was normal per patient. Notes 8/10 epigastric and periumbilical pain. Decreased appetite due to pain and diarrhea. No fevers, no chills, no sick contacts, does not endorse eating anything out of the ordinary, does not recall his meal prior to the onset of symptoms. No cp, sob, no loc, no acute vision or hearing changes. No other ros symptoms reported.     ED course:   bp: 92 mm Hg/55 mm Hg  hr: 93 /min  rr: 18 /min  temp;97.7 Degrees F  oral  spo2: 96 %    wbc elevated 12k  lactate 4.4-->3.5-->2.2  cr 2.4 (unknown baseline)     CTAP w/IV contrast:     01. LIVER: Cirrhotic morphology. Hepatic steatosis.  02. SPLEEN: Normal unenhanced.  03. PANCREAS: Atrophic.  04. GALLBLADDER/BILIARY TREE: Gallbladder not definitively visualized.    No biliary duct dilatation.  05. ADRENALS: Normal.  06. KIDNEYS: Symmetric in size.  No hydronephrosis. Bilateral   nonobstructing renal stones measuring 1.5 x 0.7 cm and left renal pole   (series and 601 image 49) and 0.3 cm in the right renal lower pole   (series 3 image 142).  07. LYMPHADENOPATHY/RETROPERITONEUM: Prominent retroperitoneal lymph   nodes measuring upto 1 cm (series 4 image 43).  08. BOWEL: No bowel obstruction.  09. PELVIC VISCERA: Dense irregularity within the urinary bladder   measuring 5.3 x 4.6 cm (series 4 image 89). Prostate measures 3.7 cm in   transverse dimension (series 4 image 93).  10. PELVIC LYMPH NODES: No enlarged lymph nodes.  11. VASCULATURE: Diffuse calcified atherosclerotic disease of the aorta   and its branches. Multiple peripherally calcified vessels at the level of   the mid pancrea    impression:   No bowel obstruction.    Dense irregularity within the urinary bladder measuring up to 5.3 cm   concerning for an underlying lesion. No hydronephrosis.    Prominent retroperitoneal lymph nodes measuring up to 1 cm.    Cirrhotic morphology of the liver. Hepatic steatosis.    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     (17 Sep 2024 03:44)    Patient found to have bladder cancer. Started on chemotherapy.   Discussed at bedside.     ITEMS NOT CHECKED ARE NOT PRESENT    SOCIAL HISTORY:   Significant other/partner[ ]  Children[x ]  Mandaen/Spirituality:  Substance hx:  [ ]   Tobacco hx:  [ ]   Alcohol hx: [ ]   Living Situation: [ ]Home  [ ]Long term care  [ ]Rehab [ ]Other  Home Services: [ ] HHA [ ] Visting RN [ ] Hospice  Occupation:  Home Opioid hx:  [ ] Y [ ] N [ ] I-Stop Reference No:     ADVANCE DIRECTIVES:    [ ] Full Code [ ] DNR  MOLST  [ ]  Living Will  [ ]   DECISION MAKER(s):  [ ] Health Care Proxy(s)  [ ] Surrogate(s)  [ ] Guardian           Name(s): Phone Number(s):    BASELINE (I)ADL(s) (prior to admission):  [place]: [ ]Total  [ ] Moderate [ ]Dependent  Palliative Performance Status Version 2:         %    http://npcrc.org/files/news/palliative_performance_scale_ppsv2.pdf    Allergies    No Known Allergies    Intolerances    MEDICATIONS  (STANDING):  chlorhexidine 2% Cloths 1 Application(s) Topical daily  furosemide   Injectable 40 milliGRAM(s) IV Push daily  lactobacillus acidophilus 1 Tablet(s) Oral daily  polyethylene glycol 3350 17 Gram(s) Oral every 24 hours  senna 2 Tablet(s) Oral at bedtime  tamsulosin 0.4 milliGRAM(s) Oral daily    MEDICATIONS  (PRN):  acetaminophen     Tablet .. 650 milliGRAM(s) Oral every 6 hours PRN Severe Pain (7 - 10)  loperamide 2 milliGRAM(s) Oral every 6 hours PRN Diarrhea  oxyCODONE    IR 5 milliGRAM(s) Oral every 6 hours PRN Severe Pain (7 - 10)      PRESENT SYMPTOMS: [ ]Unable to obtain due to poor mentation   Source if other than patient:  [ ]Family   [ ]Team     Pain: [ ]yes [x ]no  QOL impact -   Location -                    Aggravating factors -  Alleviating factors -   Quality -  Radiation -  Timing -   Severity (0-10 scale):  Minimal acceptable level (0-10 scale):     CPOT:    https://www.[email or web address]/getattachment/[email or web address]/Critical-Care-Pain-Observation-Tool-(CPOT)    PAIN AD Score:   http://geriatrictoolkit.[facility]/cog/painad.pdf     Dyspnea:                           [x ]None[ ]Mild [ ]Moderate [ ]Severe     Respiratory Distress Observation Scale (RDOS):   A score of 0 to 2 signifies little or no respiratory distress, 3 signifies mild distress, scores 4 to 6 indicate moderate distress, and scores greater than 7 signify severe distress  [email or web address]://[email or web address]/sites/default/files/PDFS/893656-xcqjchetvkf-yqnlipwb-yqllgtqopof-kmucq.pdf    Anxiety:                             [ ]None[ ]Mild [ ]Moderate [ ]Severe   Fatigue:                             [ ]None[ ]Mild [ ]Moderate [ ]Severe   Nausea:                             [ ]None[ ]Mild [ ]Moderate [ ]Severe   Loss of appetite:              [ ]None[ ]Mild [ ]Moderate [ ]Severe   Constipation:                    [ ]None[ ]Mild [ ]Moderate [ ]Severe    Other Symptoms:  [x ]All other review of systems negative     Palliative Performance Status Version 2:         %    http://[email or web address]/files/news/palliative_performance_scale_ppsv2.pdf  PHYSICAL EXAM:    GENERAL:  NAD   PULMONARY:  Non labored breathing  NEUROLOGIC: Grossly intact  BEHAVIORAL/PSYCH:  Calm    LABS: I have reviewed daily labs                          10.6   14.85 )-----------( 256      ( 30 Sep 2024 09:25 )             29.9       09-30    141  |  103  |  30[H]  ----------------------------<  101[H]  3.9   |  23  |  1.1    Ca    8.6      30 Sep 2024 09:25  Mg     1.6     09-30    TPro  4.8[L]  /  Alb  2.9[L]  /  TBili  6.7[H]  /  DBili  x   /  AST  412[H]  /  ALT  98[H]  /  AlkPhos  874[H]  09-30          RADIOLOGY & ADDITIONAL STUDIES: I have reviewed new imaging    PROTEIN CALORIE MALNUTRITION PRESENT: [ ]mild [ ]moderate [ ]severe [ ]underweight [ ]morbid obesity  https://www.andeal.org/vault/2440/web/files/ONC/Table_Clinical%20Characteristics%20to%20Document%20Malnutrition-White%20JV%20et%20al%202012.pdf    Height (cm): 177.8 (09-17-24 @ 18:17)  Weight (kg): 57.3 (09-17-24 @ 18:17)  BMI (kg/m2): 18.1 (09-17-24 @ 18:17)    [ ]PPSV2 < or = to 30% [ ]significant weight loss  [ ]poor nutritional intake  [ ]anasarca      [ ]Artificial Nutrition      Palliative Care Spiritual/Emotional Screening Tool Question  Severity (0-4):                    OR                    [ x] Unable to determine. Will assess at later time if appropriate.  Score of 2 or greater indicates recommendation of Chaplaincy and/or SW referral  Chaplaincy Referral: [ ] Yes [ ] Refused [ ] Following     Caregiver [place]:  [ ] Yes [ ] No    OR    [x ] Unable to determine. Will assess at later time if appropriate.  Social Work Referral [ ]  Patient and Family Centered Care Referral [ ]    Anticipatory Grief Present: [ ] Yes [ ] No    OR     [ x] Unable to determine. Will assess at later time if appropriate.  Social Work Referral [ ]  Patient and Family Centered Care Referral [ ]    REFERRALS:   [ ]Chaplaincy  [ ]Hospice  [ ]Child Life  [ ]Social Work  [ ]Case management [ ]Holistic Therapy     Palliative care education provided to patient and/or family

## 2024-10-01 NOTE — PROGRESS NOTE ADULT - SUBJECTIVE AND OBJECTIVE BOX
SUBJECTIVE/OVERNIGHT EVENTS  Today is hospital day 15d. This morning patient was seen and examined at bedside, resting comfortably in bed. No acute or major events overnight.    CODE STATUS: full code     FAMILY COMMUNICATION  Contact date:  Name of person contacted:  Relationship to patient:  Communication details:    MEDICATIONS  STANDING MEDICATIONS  chlorhexidine 2% Cloths 1 Application(s) Topical daily  furosemide   Injectable 40 milliGRAM(s) IV Push daily  heparin   Injectable 5000 Unit(s) SubCutaneous every 12 hours  lactobacillus acidophilus 1 Tablet(s) Oral daily  polyethylene glycol 3350 17 Gram(s) Oral every 24 hours  senna 2 Tablet(s) Oral at bedtime  tamsulosin 0.4 milliGRAM(s) Oral daily    PRN MEDICATIONS  acetaminophen     Tablet .. 650 milliGRAM(s) Oral every 6 hours PRN  loperamide 2 milliGRAM(s) Oral every 6 hours PRN  oxyCODONE    IR 5 milliGRAM(s) Oral every 6 hours PRN    VITALS  T(F): 98.2 (10-01-24 @ 05:33), Max: 98.2 (10-01-24 @ 05:33)  HR: 96 (10-01-24 @ 08:11) (92 - 96)  BP: 126/65 (10-01-24 @ 05:33) (106/54 - 126/65)  RR: 18 (10-01-24 @ 05:33) (18 - 18)  SpO2: 93% (10-01-24 @ 08:11) (88% - 94%)    PHYSICAL EXAM  GENERAL  ( x ) NAD, lying in bed comfortably     (  ) obtunded     (  ) lethargic     (  ) somnolent    HEAD  (x  ) Atraumatic     (  ) hematoma     (  ) laceration (specify location:       )     NECK  ( x ) Supple     (  ) neck stiffness     (  ) nuchal rigidity     (  )  no JVD     (  ) JVD present ( -- cm)    HEART  Rate -->  ( x ) normal rate    (  ) bradycardic    (  ) tachycardic  Rhythm -->  (x  ) regular    (  ) regularly irregular    (  ) irregularly irregular  Murmurs -->  (x  ) normal s1/s2    (  ) systolic murmur    (  ) diastolic murmur    (  ) continuous murmur     (  ) S3 present    (  ) S4 present    LUNGS  (x  )Unlabored respirations     (  ) tachypnea  ( x ) B/L air entry     (  ) decreased breath sounds in:  (location     )    (  x) no adventitious sound     x(  ) crackles     (  ) wheezing      (  ) rhonchi      (specify location:       )  (  ) chest wall tenderness (specify location:       )    ABDOMEN  (x  ) Soft     (  ) tense   |   ( ) nondistended     (x  ) distended   |   (  ) +BS     (  ) hypoactive bowel sounds     (  ) hyperactive bowel sounds  (x  ) nontender     (  ) RUQ tenderness     (  ) RLQ tenderness     (  ) LLQ tenderness     (  ) epigastric tenderness     (  ) diffuse tenderness  (  ) Splenomegaly      (  ) Hepatomegaly      (  ) Jaundice     (  ) ecchymosis     EXTREMITIES  (  ) Normal     (  ) Rash     (  ) ecchymosis     (  ) varicose veins      (  x) pitting edema     (  ) non-pitting edema   (  ) ulceration     (  ) gangrene:     (location:     )    NERVOUS SYSTEM  ( x ) A&Ox3     (  ) confused     (  ) lethargic  CN II-XII:     (  ) Intact     (  ) focal deficits  (Specify:     )   Upper extremities:     (  ) strength X/5     (  ) focal deficit (specify:    )  Lower extremities:     (  ) strength  X/5    (  ) focal deficit (specify:    )    SKIN  ( x ) No rashes or lesions     (  ) maculopapular rash     (  ) pustules     (  ) vesicles     (  ) ulcer     (  ) ecchymosis     (specify location:     )    (  ) Indwelling [place] Catheter   Date insterted:    Reason (  ) Critical illness     (  ) urinary retention    (  ) Accurate Ins/Outs Monitoring     (  ) CMO patient    (  ) Central Line  Date inserted:  Location: (  ) Right IJ   (  ) Left IJ   (  ) Right Fem   (  ) Left Fem    (  ) SPC  (  ) pigtail  (  ) PEG tube  (  ) colostomy  (  ) jejunostomy  (  ) U-Dall    LABS             10.6   14.85 )-----------( 256      ( 09-30-24 @ 09:25 )             29.9     141  |  103  |  30  -------------------------<  101   09-30-24 @ 09:25  3.9  |  23  |  1.1    Ca      8.6     09-30-24 @ 09:25  Mg     1.6     09-30-24 @ 09:25    TPro  4.8  /  Alb  2.9  /  TBili  6.7  /  DBili  x   /  AST  412  /  ALT  98  /  AlkPhos  874  /  GGT  x     09-30-24 @ 09:25        Urinalysis Basic - ( 30 Sep 2024 09:25 )    Color: x / Appearance: x / SG: x / pH: x  Gluc: 101 mg/dL / Ketone: x  / Bili: x / Urobili: x   Blood: x / Protein: x / Nitrite: x   Leuk Esterase: x / RBC: x / WBC x   Sq Epi: x / Non Sq Epi: x / Bacteria: x          IMAGING SUBJECTIVE/OVERNIGHT EVENTS  Today is hospital day 15d. This morning patient was seen and examined at bedside, resting comfortably in bed. No acute or major events overnight.    Pt still complaining of urinary urgency. Hematuric urine noted in condom cath bag.     CODE STATUS: full code     FAMILY COMMUNICATION  Contact date:  Name of person contacted:  Relationship to patient:  Communication details:    MEDICATIONS  STANDING MEDICATIONS  chlorhexidine 2% Cloths 1 Application(s) Topical daily  furosemide   Injectable 40 milliGRAM(s) IV Push daily  heparin   Injectable 5000 Unit(s) SubCutaneous every 12 hours  lactobacillus acidophilus 1 Tablet(s) Oral daily  polyethylene glycol 3350 17 Gram(s) Oral every 24 hours  senna 2 Tablet(s) Oral at bedtime  tamsulosin 0.4 milliGRAM(s) Oral daily    PRN MEDICATIONS  acetaminophen     Tablet .. 650 milliGRAM(s) Oral every 6 hours PRN  loperamide 2 milliGRAM(s) Oral every 6 hours PRN  oxyCODONE    IR 5 milliGRAM(s) Oral every 6 hours PRN    VITALS  T(F): 98.2 (10-01-24 @ 05:33), Max: 98.2 (10-01-24 @ 05:33)  HR: 96 (10-01-24 @ 08:11) (92 - 96)  BP: 126/65 (10-01-24 @ 05:33) (106/54 - 126/65)  RR: 18 (10-01-24 @ 05:33) (18 - 18)  SpO2: 93% (10-01-24 @ 08:11) (88% - 94%)    PHYSICAL EXAM  GENERAL  ( x ) NAD, lying in bed comfortably     (  ) obtunded     (  ) lethargic     (  ) somnolent    HEAD  (x  ) Atraumatic     (  ) hematoma     (  ) laceration (specify location:       )     NECK  ( x ) Supple     (  ) neck stiffness     (  ) nuchal rigidity     (  )  no JVD     (  ) JVD present ( -- cm)    HEART  Rate -->  ( x ) normal rate    (  ) bradycardic    (  ) tachycardic  Rhythm -->  (x  ) regular    (  ) regularly irregular    (  ) irregularly irregular  Murmurs -->  (x  ) normal s1/s2    (  ) systolic murmur    (  ) diastolic murmur    (  ) continuous murmur     (  ) S3 present    (  ) S4 present    LUNGS  (x  )Unlabored respirations     (  ) tachypnea  ( x ) B/L air entry     (  ) decreased breath sounds in:  (location     )    (  x) no adventitious sound     x(  ) crackles     (  ) wheezing      (  ) rhonchi      (specify location:       )  (  ) chest wall tenderness (specify location:       )    ABDOMEN  (x  ) Soft     (  ) tense   |   ( ) nondistended     (x  ) distended   |   (  ) +BS     (  ) hypoactive bowel sounds     (  ) hyperactive bowel sounds  (x  ) nontender     (  ) RUQ tenderness     (  ) RLQ tenderness     (  ) LLQ tenderness     (  ) epigastric tenderness     (  ) diffuse tenderness  (  ) Splenomegaly      (  ) Hepatomegaly      (  ) Jaundice     (  ) ecchymosis     EXTREMITIES  (  ) Normal     (  ) Rash     (  ) ecchymosis     (  ) varicose veins      (  x) pitting edema     (  ) non-pitting edema   (  ) ulceration     (  ) gangrene:     (location:     )    NERVOUS SYSTEM  ( x ) A&Ox3     (  ) confused     (  ) lethargic  CN II-XII:     (  ) Intact     (  ) focal deficits  (Specify:     )   Upper extremities:     (  ) strength X/5     (  ) focal deficit (specify:    )  Lower extremities:     (  ) strength  X/5    (  ) focal deficit (specify:    )    SKIN  ( x ) No rashes or lesions     (  ) maculopapular rash     (  ) pustules     (  ) vesicles     (  ) ulcer     (  ) ecchymosis     (specify location:     )    (  ) Indwelling [place] Catheter   Date insterted:    Reason (  ) Critical illness     (  ) urinary retention    (  ) Accurate Ins/Outs Monitoring     (  ) CMO patient    (  ) Central Line  Date inserted:  Location: (  ) Right IJ   (  ) Left IJ   (  ) Right Fem   (  ) Left Fem    (  ) SPC  (  ) pigtail  (  ) PEG tube  (  ) colostomy  (  ) jejunostomy  (  ) U-Dall    LABS             10.6   14.85 )-----------( 256      ( 09-30-24 @ 09:25 )             29.9     141  |  103  |  30  -------------------------<  101   09-30-24 @ 09:25  3.9  |  23  |  1.1    Ca      8.6     09-30-24 @ 09:25  Mg     1.6     09-30-24 @ 09:25    TPro  4.8  /  Alb  2.9  /  TBili  6.7  /  DBili  x   /  AST  412  /  ALT  98  /  AlkPhos  874  /  GGT  x     09-30-24 @ 09:25        Urinalysis Basic - ( 30 Sep 2024 09:25 )    Color: x / Appearance: x / SG: x / pH: x  Gluc: 101 mg/dL / Ketone: x  / Bili: x / Urobili: x   Blood: x / Protein: x / Nitrite: x   Leuk Esterase: x / RBC: x / WBC x   Sq Epi: x / Non Sq Epi: x / Bacteria: x          IMAGING

## 2024-10-02 NOTE — PROGRESS NOTE ADULT - ATTENDING COMMENTS
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  Patient ate out on 9/6 and began to experience abdominal pain and diarrhea. Denies any vomiting but endorses nausea, notes having 30-40 loose stools over the past week and over the past day he's had 3-5 loose stools. No melena, no hematochezia, describes stools as watery and filled with mucus. No prior history of this in the past, last colonoscopy was 8 years ago and was normal per patient. Notes 8/10 epigastric and periumbilical pain. Decreased appetite due to pain and diarrhea. No fevers, no chills, no sick contacts, does not endorse eating anything out of the ordinary, does not recall his meal prior to the onset of symptoms. No cp, sob, no loc, no acute vision or hearing changes. No other ros symptoms reported.       #Metastatic bladder cancer with liver mets   #Gross Hematuria   #Pseudocirrhosis   - Dense irregularity within the urinary bladder measuring up to 5.3 cm   - MR abdomen --> Enlarged heterogeneous liver with innumerable bilobar hepatic masses, suspicious for metastatic disease. Multiple subcentimeter and mildly enlarged upper abdominal lymph nodes, as detailed above, suspicious for lymph node involvement. Numerous foci of osseous metastasis, as detailed above. Partially imaged 3.9 cm enhancing lobulated soft tissue mass within the urinary bladder, suspicious for neoplasm.  concerning for an underlying lesion. No hydronephrosis.  - Pt s/p biopsy with IR 9/23: remarkable for cytopathology consistent w/ bladder cancer  - S/P D1 of  Gemcitabine 650 mg/m2 and Carboplatin AUC 4 (dose reduced), given his elevated bilirubin, on 9/28, due for next dose of Gemcitabine on 10/4/24   - Uro: Recommend OP f/u w/  for cystoscopy and further hematuria w/u when medically optimal  - pain control   - dvt ppx     #Diarrhea  (resolved)  - GI PCR panel negative, C. Diff negative    #Acute hypoxemic respiratory failure - improving   #Fluid Overload   - CT PE showing artifact vs small PE (less likely)   - LE Duplex negative   - c/w Lasix 40mg IV daily    -currently on 2L NC      #KANWAL, resolved     #HLD  - hold statin due to elevated LFTs    #Nephrolithiasis   - notes passing kidney stones in past and had one recently     DVT proph: heparin    Grave Prognosis

## 2024-10-02 NOTE — PROGRESS NOTE ADULT - ASSESSMENT
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  Patient ate out on 9/6 and began to experience abdominal pain and diarrhea. Denies any vomiting but endorses nausea, notes having 30-40 loose stools over the past week and over the past day he's had 3-5 loose stools. No melena, no hematochezia, describes stools as watery and filled with mucus. No prior history of this in the past, last colonoscopy was 8 years ago and was normal per patient. Notes 8/10 epigastric and periumbilical pain. Decreased appetite due to pain and diarrhea. No fevers, no chills, no sick contacts, does not endorse eating anything out of the ordinary, does not recall his meal prior to the onset of symptoms. No cp, sob, no loc, no acute vision or hearing changes. No other ros symptoms reported.     #bladder mass  - Dense irregularity within the urinary bladder measuring up to 5.3 cm   - MR abdomen --> Enlarged heterogeneous liver with innumerable bilobar hepatic masses, suspicious for metastatic disease. Multiple subcentimeter and mildly enlarged upper abdominal lymph nodes, as detailed above, suspicious for lymph node involvement. Numerous foci of osseous metastasis, as detailed above. Partially imaged 3.9 cm enhancing lobulated soft tissue mass within the urinary bladder, suspicious for neoplasm.  concerning for an underlying lesion. No hydronephrosis.  -Pt s/p biopsy with IR 9/23: remarkable for cytopathology consistent w/ bladder cancer      -Heme-onc aware.- S/P D1 of  Gemcitabine 650 mg/m2 and Carboplatin AUC 4 (dose reduced), given his elevated bilirubin, on 9/28, due for next dose of Gemcitabine on 10/4/24   - urology evaluated - o/p f/u  -Pt to undergo treatment w/ heme-onc per family decision    #?Liver mets   #transaminitis  diarrhea (resolved)  -pt has history of fentanyl patch use - could be accounting for diarrhea with w/d diarrhea  -pt states two weeks when abdominal pain and diarrhea started, he had shrimp/seafood from [place] [employer]  - CT a/p showing cirrhotic morphology - suspect metastasis from bladder tumor    - RUQ US: cirrhotic liver  - t bili 2.6 --> 2.1 --> 3.1--> 4.3  -  ALT 61  - lipase 81  - hepatitis panel negative  - GI PCR panel negative, C. Diff negative  - AFB ngtd, bcx ngtd, ua negative   - Not on antibx  - Hepatology consult noted  - CA 19-9 +ve, AFP -ve, HIV -ve, IgM 27  - CLD workup neg   -oxycodone 5mg q6hr added for pain control s/p biopsy     #tachypnea   -pt was tachycardic 9/26  -EKG obtained: showed sinus tach  -pt desaturated to 90% on RA.  Further desturated 9/29. On 4L NC   -CXR (9/28): Increasing right basilar opacity/effusion and interstitial opacities.       Repeat CXR (9/30):Right basilar opacity/pleural effusion, increased. Bilateral interstitial opacities, unchanged..  - d-dimer 1119  -V/q scan inclusive for PE. ?subsegmental PE     -heparin subQ restarted  -lasix 40mg started today for pleural effusions on CXR    #dysuria  Pt complaints of burning with urination   -UA +ketones, protein, small LE, blood - unchanged from prior    #urinary urgency  -has frequent urge to urinate but unable to urinate large volume  -c/w flomax     #hematuria  -MRI Abd 9/19 neg for hydro  Uro: Recommend OP f/u w/  for cystoscopy and further hematuria w/u when medically optimal  -Pt had some hematuria today. - heparin d/c'ed. Uro aware.        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  Patient ate out on 9/6 and began to experience abdominal pain and diarrhea. Denies any vomiting but endorses nausea, notes having 30-40 loose stools over the past week and over the past day he's had 3-5 loose stools. No melena, no hematochezia, describes stools as watery and filled with mucus. No prior history of this in the past, last colonoscopy was 8 years ago and was normal per patient. Notes 8/10 epigastric and periumbilical pain. Decreased appetite due to pain and diarrhea. No fevers, no chills, no sick contacts, does not endorse eating anything out of the ordinary, does not recall his meal prior to the onset of symptoms. No cp, sob, no loc, no acute vision or hearing changes. No other ros symptoms reported.     #bladder mass  - Dense irregularity within the urinary bladder measuring up to 5.3 cm   - MR abdomen --> Enlarged heterogeneous liver with innumerable bilobar hepatic masses, suspicious for metastatic disease. Multiple subcentimeter and mildly enlarged upper abdominal lymph nodes, as detailed above, suspicious for lymph node involvement. Numerous foci of osseous metastasis, as detailed above. Partially imaged 3.9 cm enhancing lobulated soft tissue mass within the urinary bladder, suspicious for neoplasm.  concerning for an underlying lesion. No hydronephrosis.  -Pt s/p biopsy with IR 9/23: remarkable for cytopathology consistent w/ bladder cancer      -Heme-onc aware.- S/P D1 of  Gemcitabine 650 mg/m2 and Carboplatin AUC 4 (dose reduced), given his elevated bilirubin, on 9/28, due for next dose of Gemcitabine on 10/4/24   - urology evaluated - o/p f/u  -Pt to undergo treatment w/ heme-onc per family decision    #?Liver mets   #transaminitis  diarrhea (resolved)  -pt has history of fentanyl patch use - could be accounting for diarrhea with w/d diarrhea  -pt states two weeks when abdominal pain and diarrhea started, he had shrimp/seafood from [employer]  - CT a/p showing cirrhotic morphology - suspect metastasis from bladder tumor    - RUQ US: cirrhotic liver  - t bili 2.6 --> 2.1 --> 3.1--> 4.3  -  ALT 61  - lipase 81  - hepatitis panel negative  - GI PCR panel negative, C. Diff negative  - AFB ngtd, bcx ngtd, ua negative   - Not on antibx  - Hepatology consult noted  - CA 19-9 +ve, AFP -ve, HIV -ve, IgM 27  - CLD workup neg   -c/w oxycodone 5mg q6hr added for pain control     #tachypnea   -pt was tachycardic 9/26  -EKG obtained: showed sinus tach  -pt desaturated to 90% on RA.  Further desturated 9/29. On 4L NC   -CXR (9/28): Increasing right basilar opacity/effusion and interstitial opacities.       Repeat CXR (9/30):Right basilar opacity/pleural effusion, increased. Bilateral interstitial opacities, unchanged..  - d-dimer 1119  -V/q scan inclusive for PE. ?subsegmental PE     -heparin subQ restarted  -c/w lasix 40mg for pleural effusions on CXR. Will order repeat CXR today  -currently on 2L NC. Will try to titrate to room air     #dysuria  Pt complaints of burning with urination   -UA +ketones, protein, small LE, blood - unchanged from prior    #urinary urgency  -has frequent urge to urinate but unable to urinate large volume  -c/w flomax     #hematuria  -MRI Abd 9/19 neg for hydro  Uro: Recommend OP f/u w/  for cystoscopy and further hematuria w/u when medically optimal  -Pt had some hematuria today. - heparin d/c'ed. Uro aware.        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  Patient ate out on 9/6 and began to experience abdominal pain and diarrhea. Denies any vomiting but endorses nausea, notes having 30-40 loose stools over the past week and over the past day he's had 3-5 loose stools. No melena, no hematochezia, describes stools as watery and filled with mucus. No prior history of this in the past, last colonoscopy was 8 years ago and was normal per patient. Notes 8/10 epigastric and periumbilical pain. Decreased appetite due to pain and diarrhea. No fevers, no chills, no sick contacts, does not endorse eating anything out of the ordinary, does not recall his meal prior to the onset of symptoms. No cp, sob, no loc, no acute vision or hearing changes. No other ros symptoms reported.       #bladder mass  - Dense irregularity within the urinary bladder measuring up to 5.3 cm   - MR abdomen --> Enlarged heterogeneous liver with innumerable bilobar hepatic masses, suspicious for metastatic disease. Multiple subcentimeter and mildly enlarged upper abdominal lymph nodes, as detailed above, suspicious for lymph node involvement. Numerous foci of osseous metastasis, as detailed above. Partially imaged 3.9 cm enhancing lobulated soft tissue mass within the urinary bladder, suspicious for neoplasm.  concerning for an underlying lesion. No hydronephrosis.  -Pt s/p biopsy with IR 9/23: remarkable for cytopathology consistent w/ bladder cancer      -Heme-onc aware.- S/P D1 of  Gemcitabine 650 mg/m2 and Carboplatin AUC 4 (dose reduced), given his elevated bilirubin, on 9/28, due for next dose of Gemcitabine on 10/4/24   - urology evaluated - o/p f/u  -Pt to undergo treatment w/ heme-onc per family decision    #?Liver mets   #transaminitis  diarrhea (resolved)  -pt has history of fentanyl patch use - could be accounting for diarrhea with w/d diarrhea  -pt states two weeks when abdominal pain and diarrhea started, he had shrimp/seafood from [employer]  - CT a/p showing cirrhotic morphology - suspect metastasis from bladder tumor    - RUQ US: cirrhotic liver  - t bili 2.6 --> 2.1 --> 3.1--> 4.3  -  ALT 61  - lipase 81  - hepatitis panel negative  - GI PCR panel negative, C. Diff negative  - AFB ngtd, bcx ngtd, ua negative   - Not on antibx  - Hepatology consult noted  - CA 19-9 +ve, AFP -ve, HIV -ve, IgM 27  - CLD workup neg   -c/w oxycodone 5mg q6hr added for pain control     #tachypnea   -pt was tachycardic 9/26  -EKG obtained: showed sinus tach  -pt desaturated to 90% on RA.  Further desturated 9/29. On 4L NC   -CXR (9/28): Increasing right basilar opacity/effusion and interstitial opacities.       Repeat CXR (9/30):Right basilar opacity/pleural effusion, increased. Bilateral interstitial opacities, unchanged..  - d-dimer 1119  -V/q scan inclusive for PE. ?subsegmental PE     -heparin subQ restarted  -c/w lasix 40mg for pleural effusions on CXR. Will order repeat CXR today  -currently on 2L NC. Will try to titrate to room air     #dysuria  Pt complaints of burning with urination   -UA +ketones, protein, small LE, blood - unchanged from prior    #urinary urgency  -has frequent urge to urinate but unable to urinate large volume  -c/w flomax     #hematuria  -MRI Abd 9/19 neg for hydro  Uro: Recommend OP f/u w/  for cystoscopy and further hematuria w/u when medically optimal  -Pt had some hematuria today. - heparin d/c'ed. Uro aware.        #kanwal, HAGMA (resolved)  -suspect HAGMA in setting of KANWAL  cr 1.1 on 9/30  - kanwal likely pre-renal from dehydration   -will repeat renal U/S if uptrending     #HTN  -currently on no meds- BP WNL   - hold ACE in setting of KANWAL    #HLD  - hold statin due to elevated LFTs    #Nephrolithiasis   - notes passing kidney stones in past and had one recently     DVT proph: heparin   GI ppx - no ppi  Diet: regular, low fiber   discussed GOC: full code

## 2024-10-02 NOTE — PROGRESS NOTE ADULT - SUBJECTIVE AND OBJECTIVE BOX
SUBJECTIVE/OVERNIGHT EVENTS  Today is hospital day 16d. This morning patient was seen and examined at bedside, resting comfortably in bed. No acute or major events overnight.    Pt complains of dizziness and pain on initiating urination. Denies any other complaints.  Of note, patient is more jaundiced today than yesterday. Abd more distended.  He says that is drinking frequently but not eating very often.     CODE STATUS: DNR/intubate     FAMILY COMMUNICATION  Contact date:  Name of person contacted:  Relationship to patient:  Communication details:    MEDICATIONS  STANDING MEDICATIONS  chlorhexidine 2% Cloths 1 Application(s) Topical daily  furosemide   Injectable 40 milliGRAM(s) IV Push daily  lactobacillus acidophilus 1 Tablet(s) Oral daily  polyethylene glycol 3350 17 Gram(s) Oral every 24 hours  senna 2 Tablet(s) Oral at bedtime  tamsulosin 0.4 milliGRAM(s) Oral daily    PRN MEDICATIONS  acetaminophen     Tablet .. 650 milliGRAM(s) Oral every 6 hours PRN  loperamide 2 milliGRAM(s) Oral every 6 hours PRN    VITALS  T(F): 98.1 (10-02-24 @ 06:07), Max: 98.1 (10-02-24 @ 06:07)  HR: 96 (10-02-24 @ 07:50) (96 - 101)  BP: 138/62 (10-02-24 @ 07:50) (110/62 - 142/60)  RR: 18 (10-02-24 @ 06:07) (18 - 18)  SpO2: 99% (10-02-24 @ 07:50) (96% - 99%)    PHYSICAL EXAM  GENERAL  ( x) NAD, lying in bed comfortably     (  ) obtunded     (  ) lethargic     (  ) somnolent    HEAD  ( x Atraumatic     (  ) hematoma     (  ) laceration (specify location:       )     NECK  ( x ) Supple     (  ) neck stiffness     (  ) nuchal rigidity     (  )  no JVD     (  ) JVD present ( -- cm)    HEART  Rate -->  ( x ) normal rate    (  ) bradycardic    (  ) tachycardic  Rhythm -->  (x  ) regular    (  ) regularly irregular    (  ) irregularly irregular  Murmurs -->  (x  ) normal s1/s2    (  ) systolic murmur    (  ) diastolic murmur    (  ) continuous murmur     (  ) S3 present    (  ) S4 present    LUNGS  (x  )Unlabored respirations     (  ) tachypnea  (  x) B/L air entry     (  ) decreased breath sounds in:  (location     )    (  ) no adventitious sound     (  x) crackles     (  ) wheezing      (  ) rhonchi      (specify location:       )  (  ) chest wall tenderness (specify location:   LLL    )    ABDOMEN  (x  ) Soft     (  ) tense   |   (  ) nondistended     ( x ) ++distended   |   (  ) +BS     (  ) hypoactive bowel sounds     (  ) hyperactive bowel sounds  (  x) nontender     (  ) RUQ tenderness     (  ) RLQ tenderness     (  ) LLQ tenderness     (  ) epigastric tenderness     (  ) diffuse tenderness  (  ) Splenomegaly      (  ) Hepatomegaly      ( x ) Jaundice     (  ) ecchymosis     EXTREMITIES  (  ) Normal     (  ) Rash     (  ) ecchymosis     (  ) varicose veins      (  ) pitting edema     (x  ) non-pitting edema   (  ) ulceration     (  ) gangrene:     (location: LLE    )    NERVOUS SYSTEM  ( x) A&Ox3     (  ) confused     (  ) lethargic  CN II-XII:     (  ) Intact     (  ) focal deficits  (Specify:     )   Upper extremities:     (  ) strength X/5     (  ) focal deficit (specify:    )  Lower extremities:     (  ) strength  X/5    (  ) focal deficit (specify:    )    SKIN  ( x ) No rashes or lesions     (  ) maculopapular rash     (  ) pustules     (  ) vesicles     (  ) ulcer     (  ) ecchymosis     (specify location:     )    (  ) Indwelling [place] Catheter   Date insterted:    Reason (  ) Critical illness     (  ) urinary retention    (  ) Accurate Ins/Outs Monitoring     (  ) CMO patient    (  ) Central Line  Date inserted:  Location: (  ) Right IJ   (  ) Left IJ   (  ) Right Fem   (  ) Left Fem    (  ) SPC  (  ) pigtail  (  ) PEG tube  (  ) colostomy  (  ) jejunostomy  (  ) U-Dall    LABS             9.5    6.27  )-----------( 196      ( 10-02-24 @ 01:12 )             26.3     138  |  100  |  32  -------------------------<  110   10-02-24 @ 01:12  3.9  |  22  |  0.9    Ca      8.4     10-02-24 @ 01:12  Mg     1.8     10-01-24 @ 15:32    TPro  4.6  /  Alb  2.8  /  TBili  8.1  /  DBili  x   /  AST  339  /  ALT  91  /  AlkPhos  860  /  GGT  x     10-02-24 @ 01:12        Urinalysis Basic - ( 02 Oct 2024 01:12 )    Color: x / Appearance: x / SG: x / pH: x  Gluc: 110 mg/dL / Ketone: x  / Bili: x / Urobili: x   Blood: x / Protein: x / Nitrite: x   Leuk Esterase: x / RBC: x / WBC x   Sq Epi: x / Non Sq Epi: x / Bacteria: x          IMAGING

## 2024-10-03 NOTE — PROGRESS NOTE ADULT - SUBJECTIVE AND OBJECTIVE BOX
SUBJECTIVE/OVERNIGHT EVENTS  Today is hospital day 17d. This morning patient was seen and examined at bedside, resting in bed. No acute or major events overnight. Pt complains that he is urinating frequently.     MEDICATIONS  STANDING MEDICATIONS  chlorhexidine 2% Cloths 1 Application(s) Topical daily  dronabinol 2.5 milliGRAM(s) Oral three times a day  furosemide   Injectable 40 milliGRAM(s) IV Push daily  heparin   Injectable 5000 Unit(s) SubCutaneous every 12 hours  lactobacillus acidophilus 1 Tablet(s) Oral daily  magnesium gluconate 500 milliGRAM(s) Oral at bedtime  polyethylene glycol 3350 17 Gram(s) Oral every 24 hours  senna 2 Tablet(s) Oral at bedtime  tamsulosin 0.4 milliGRAM(s) Oral daily    PRN MEDICATIONS  acetaminophen     Tablet .. 650 milliGRAM(s) Oral every 6 hours PRN  loperamide 2 milliGRAM(s) Oral every 6 hours PRN  oxyCODONE    IR 5 milliGRAM(s) Oral every 6 hours PRN    VITALS  T(F): 98.1 (10-03-24 @ 12:40), Max: 98.4 (10-02-24 @ 21:44)  HR: 99 (10-03-24 @ 12:40) (90 - 101)  BP: 124/67 (10-03-24 @ 12:40) (119/49 - 137/63)  RR: 18 (10-03-24 @ 12:40) (18 - 18)  SpO2: 96% (10-03-24 @ 12:40) (89% - 100%)    PHYSICAL EXAM  GENERAL: NAD, lying in bed   HEAD:  Atraumatic, normocephalic  EYES: EOMI, sclera yellow  ENT: Moist mucous membranes  NECK: Supple, no JVD  HEART: Regular rate and rhythm  LUNGS: Unlabored respirations.  Clear to auscultation bilaterally  ABDOMEN: Abd firm, liver palpable, non-distended  EXTREMITIES: 2+ peripheral pulses bilaterally. No clubbing, cyanosis, or edema  NERVOUS SYSTEM:  A&Ox3, no focal deficits   SKIN: jaundice     LABS             9.8    3.95  )-----------( 152      ( 10-03-24 @ 07:42 )             27.9     139  |  97  |  28  -------------------------<  122   10-03-24 @ 07:42  3.3  |  24  |  0.8    Ca      8.4     10-03-24 @ 07:42  Mg     1.7     10-03-24 @ 07:42    TPro  4.8  /  Alb  2.6  /  TBili  8.4  /  DBili  x   /  AST  331  /  ALT  91  /  AlkPhos  947  /  GGT  x     10-03-24 @ 07:42        Urinalysis Basic - ( 03 Oct 2024 07:42 )    Color: x / Appearance: x / SG: x / pH: x  Gluc: 122 mg/dL / Ketone: x  / Bili: x / Urobili: x   Blood: x / Protein: x / Nitrite: x   Leuk Esterase: x / RBC: x / WBC x   Sq Epi: x / Non Sq Epi: x / Bacteria: x          IMAGING

## 2024-10-03 NOTE — PROGRESS NOTE ADULT - ASSESSMENT
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  Patient ate out on 9/6 and began to experience abdominal pain and diarrhea. Denies any vomiting but endorses nausea, notes having 30-40 loose stools over the past week and over the past day he's had 3-5 loose stools. No melena, no hematochezia, describes stools as watery and filled with mucus. No prior history of this in the past, last colonoscopy was 8 years ago and was normal per patient. Notes 8/10 epigastric and periumbilical pain. Decreased appetite due to pain and diarrhea. No fevers, no chills, no sick contacts, does not endorse eating anything out of the ordinary, does not recall his meal prior to the onset of symptoms. No cp, sob, no loc, no acute vision or hearing changes. No other ros symptoms reported.     #Bladder mass, bladder carcinoma  - Dense irregularity within the urinary bladder measuring up to 5.3 cm   - MR abdomen: liver mets, lymph node involvement, numerous foci of osseous mets, 3.9 cm enhancing lobulated soft tissue bladder mass suspicious for neoplasm  - IR bx 9/23: remarkable for cytopathology consistent w/ bladder cancer  - Heme-onc aware.- S/P D1 of  Gemcitabine 650 mg/m2 and Carboplatin AUC 4 (dose reduced), given his elevated bilirubin, on 9/28  -Pt to undergo treatment w/ heme-onc per family decision  - urology evaluated - o/p f/u  - due for next dose of Gemcitabine on 10/4/24     #Liver mets   #transaminitis  #Diarrhea-->resolved  - RUQ US: cirrhotic liver  - lipase 81  - hepatitis panel negative  - GI PCR panel negative, C. Diff negative  - AFB ngtd, bcx ngtd, ua negative   - Not on antibx  - Hepatology consult noted  - CA 19-9 +ve, AFP -ve, HIV -ve, IgM 27  - CLD workup neg   - c/w oxycodone 5mg q6hr added for pain control   - t bili trending up, 8.4 10/3  - AST/ALT trending up 331/91 10/3    #Tachypnea --> stable  - pt was tachycardic 9/26  - EKG obtained: showed sinus tach  - pt desaturated to 90% on RA.  Further desturated 9/29. On 4L NC   - CXR (9/28): Increasing right basilar opacity/effusion and interstitial opacities.   - Repeat CXR (9/30): unchanged  - d-dimer 1119  - V/q scan inclusive for PE. ?subsegmental PE  - heparin subQ restarted  - c/w lasix 40mg for pleural effusions on CXR  - currently on 2L NC. Will try to titrate to room air     #dysuria  #urinary urgency  - UA +ketones, protein, small LE, blood - unchanged from prior  - has frequent urge to urinate but unable to urinate large volume  - c/w flomax     #hematuria  - MRI Abd 9/19 neg for hydro  - Uro: Recommend OP f/u w/  for cystoscopy and further hematuria w/u when medically optimal    #KANWAL, HAGMA (resolved)  - suspect HAGMA in setting of KANWAL  - KANWAL likely pre-renal from dehydration     #HTN  - currently on no meds- BP WNL   - hold ACE in setting of KANWAL    #HLD  - hold statin due to elevated LFTs    #Hx of Nephrolithiasis   - notes passing kidney stones in past and had one recently     #MISC  DVT proph: heparin   GI ppx - no ppi  Diet: regular, low fiber   discussed GOC: full code     #PENDING  - due for next dose of Gemcitabine on 10/4/24   - t bili trending up, 8.4 10/3  - AST/ALT trending up 331/91 10/3  - c/w lasix 40mg for pleural effusions on CXR  - currently on 2L NC. Will try to titrate to room air   - daily CBC with diff, CMP

## 2024-10-03 NOTE — PROGRESS NOTE ADULT - ATTENDING COMMENTS
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  Patient ate out on 9/6 and began to experience abdominal pain and diarrhea. Denies any vomiting but endorses nausea, notes having 30-40 loose stools over the past week and over the past day he's had 3-5 loose stools. No melena, no hematochezia, describes stools as watery and filled with mucus. No prior history of this in the past, last colonoscopy was 8 years ago and was normal per patient. Notes 8/10 epigastric and periumbilical pain. Decreased appetite due to pain and diarrhea. No fevers, no chills, no sick contacts, does not endorse eating anything out of the ordinary, does not recall his meal prior to the onset of symptoms. No cp, sob, no loc, no acute vision or hearing changes. No other ros symptoms reported.       #Metastatic bladder cancer with liver mets   #Gross Hematuria   #Pseudocirrhosis   - Dense irregularity within the urinary bladder measuring up to 5.3 cm   - MR abdomen --> Enlarged heterogeneous liver with innumerable bilobar hepatic masses, suspicious for metastatic disease. Multiple subcentimeter and mildly enlarged upper abdominal lymph nodes, as detailed above, suspicious for lymph node involvement. Numerous foci of osseous metastasis, as detailed above. Partially imaged 3.9 cm enhancing lobulated soft tissue mass within the urinary bladder, suspicious for neoplasm.  concerning for an underlying lesion. No hydronephrosis.  - Pt s/p biopsy with IR 9/23: remarkable for cytopathology consistent w/ bladder cancer  - S/P D1 of  Gemcitabine 650 mg/m2 and Carboplatin AUC 4 (dose reduced), given his elevated bilirubin, on 9/28, due for next dose of Gemcitabine on 10/4/24   - Uro: Recommend OP f/u w/  for cystoscopy and further hematuria w/u when medically optimal  - pain control   - dvt ppx     #Diarrhea  (resolved)  - GI PCR panel negative, C. Diff negative    #Acute hypoxemic respiratory failure - improving   #Fluid Overload   - CT PE showing artifact vs small PE (less likely)   - LE Duplex negative   - c/w Lasix 40mg IV daily    -currently on 2L NC      #KANWAL, resolved     #HLD  - hold statin due to elevated LFTs    #Nephrolithiasis   - notes passing kidney stones in past and had one recently     DVT proph: heparin    Grave Prognosis    Pending: chemo tomorrow

## 2024-10-04 NOTE — PROGRESS NOTE ADULT - ASSESSMENT
The patient is a 80 y/o male with HTN, HLD, hx of frequent nephrolithiases, and recent passage of a stone a few days ago w/trace hematuria, cervical neck arthritis previous use of opioid pain medication, presenting for 10 days of diarrhea, abdominal pain. Notes having 30-40 loose stools over the past week and over the past day he's had 3-5 loose stools.     #Metastatic high grade urothelial cancer  #Innumerable bilobar hepatic masses,   #Mildly enlarged upper abdominal lymph nodes  #Foci of osseous metastasis  -Presented with c/o diarrhea   -CT A/p IMPRESSION: Dense irregularity within the urinary bladder measuring up to 5.3 cm   concerning for an underlying lesion. No hydronephrosis. Prominent retroperitoneal lymph nodes measuring up to 1 cm. Cirrhotic morphology of the liver. Hepatic steatosis. 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   49 , AFP 2.4,   Evaluated by urology, Pt will need to F/U with Dr. Caldwell for further  management of bladder mass.  MR 9.24: Enlarged heterogeneous liver with innumerable bilobar hepatic masses, suspicious for metastatic disease. Multiple subcentimeter and mildly enlarged upper abdominal lymph nodes, as detailed above, suspicious for lymph node involvement. Numerous foci of osseous metastases within the sacrum, bilateral iliac bones and several vertebral bodies. Partially imaged 3.9 cm enhancing lobulated soft tissue mass within the urinary bladder, suspicious for neoplasm.  Liver biopsy showed:   - Metastatic high grade carcinoma, poorly differentiated with urothelial and neuroendocrine features. See comment.      #Leucocytosis  #Normocytic anemia   K/L 2.2 K 2.13, L 0.97     # Worsening bilirubinemia/transaminitis:  Likely d/t liver mets    # Hematuria:  Urology following  Having difficulty voiding today, difficult [place] insertion    Recommendations:     - S/P D1 of  Gemcitabine 650 mg/m2 and Carboplatin AUC 4 (dose reduced), given his elevated bilirubin, on 9/28, due for next dose of Gemcitabine on 10/4/24. He tolerated D1 quite well without particular complaints or complications. The patient is a 78 y/o male with HTN, HLD, hx of frequent nephrolithiases, and recent passage of a stone a few days ago w/trace hematuria, cervical neck arthritis previous use of opioid pain medication, presenting for 10 days of diarrhea, abdominal pain. Notes having 30-40 loose stools over the past week and over the past day he's had 3-5 loose stools.     #Metastatic high grade urothelial cancer  #Innumerable bilobar hepatic masses,   #Mildly enlarged upper abdominal lymph nodes  #Foci of osseous metastasis    -Presented with c/o diarrhea   -CT A/p IMPRESSION: Dense irregularity within the urinary bladder measuring up to 5.3 cm   concerning for an underlying lesion. No hydronephrosis. Prominent retroperitoneal lymph nodes measuring up to 1 cm. Cirrhotic morphology of the liver. Hepatic steatosis. 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   49 , AFP 2.4,   Evaluated by urology, Pt will need to F/U with Dr. Caldwell for further  management of bladder mass.  MR 9.24: Enlarged heterogeneous liver with innumerable bilobar hepatic masses, suspicious for metastatic disease. Multiple subcentimeter and mildly enlarged upper abdominal lymph nodes, as detailed above, suspicious for lymph node involvement. Numerous foci of osseous metastases within the sacrum, bilateral iliac bones and several vertebral bodies. Partially imaged 3.9 cm enhancing lobulated soft tissue mass within the urinary bladder, suspicious for neoplasm.  Liver biopsy showed:   - Metastatic high grade carcinoma, poorly differentiated with urothelial and neuroendocrine features. See comment.      #Leucocytosis  #Normocytic anemia   K/L 2.2 K 2.13, L 0.97     # Worsening bilirubinemia/transaminitis:  Likely d/t liver mets    # Hematuria:  Urology following    Recommendations:     - S/P D1 of  Gemcitabine 650 mg/m2 and Carboplatin AUC 4 (dose reduced), given his elevated bilirubin, on 9/28, due for next dose of Gemcitabine on 10/4/24. He tolerated D1 quite well without particular complaints or complications.  -Due for D8 dose of chemotherapy 10/5, however due to worsening bilirubin and platelet count downtrending, will hold off on chemotherapy for now  Will follow back on 10/7 to see if his counts/bilirubin is improving.

## 2024-10-04 NOTE — PROGRESS NOTE ADULT - ATTENDING COMMENTS
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  Patient ate out on 9/6 and began to experience abdominal pain and diarrhea. Denies any vomiting but endorses nausea, notes having 30-40 loose stools over the past week and over the past day he's had 3-5 loose stools. No melena, no hematochezia, describes stools as watery and filled with mucus. No prior history of this in the past, last colonoscopy was 8 years ago and was normal per patient. Notes 8/10 epigastric and periumbilical pain. Decreased appetite due to pain and diarrhea. No fevers, no chills, no sick contacts, does not endorse eating anything out of the ordinary, does not recall his meal prior to the onset of symptoms. No cp, sob, no loc, no acute vision or hearing changes. No other ros symptoms reported.       #Metastatic bladder cancer with liver mets   #Gross Hematuria   #Pseudocirrhosis   - Dense irregularity within the urinary bladder measuring up to 5.3 cm   - MR abdomen --> Enlarged heterogeneous liver with innumerable bilobar hepatic masses, suspicious for metastatic disease. Multiple subcentimeter and mildly enlarged upper abdominal lymph nodes, as detailed above, suspicious for lymph node involvement. Numerous foci of osseous metastasis, as detailed above. Partially imaged 3.9 cm enhancing lobulated soft tissue mass within the urinary bladder, suspicious for neoplasm.  concerning for an underlying lesion. No hydronephrosis.  - Pt s/p biopsy with IR 9/23: remarkable for cytopathology consistent w/ bladder cancer  - S/P D1 of  Gemcitabine 650 mg/m2 and Carboplatin AUC 4 (dose reduced), given his elevated bilirubin, on 9/28, due for next dose of Gemcitabine on 10/4/24   - Uro: Recommend OP f/u w/  for cystoscopy and further hematuria w/u when medically optimal  - pain control   - DVT ppx     #Diarrhea  (resolved)  - GI PCR panel negative, C. Diff negative    #Acute hypoxemic respiratory failure - worsening, today requiring 4L, saturating 92%  #Fluid Overload   #Sinus tachcardia   - CT PE showing artifact vs small PE (less likely)   - LE Duplex negative   - c/w Lasix 40mg IV daily   - start broad spectrum abx and check CT PE to rule out PE   - MRSA nares negative      #KANWAL, resolved     #HLD  - hold statin due to elevated LFTs    #Nephrolithiasis   - notes passing kidney stones in past and had one recently     DVT proph: heparin    Grave Prognosis    Pending: chemo tomorrow, CT PE protocol   Plan of care d/w pt

## 2024-10-04 NOTE — PROGRESS NOTE ADULT - SUBJECTIVE AND OBJECTIVE BOX
NATHANAEL MCCLENDON 79y Male  MRN#: [id number]   Hospital Day: 18d    SUBJECTIVE  Patient is a 79y old Male who presents with a chief complaint of diarrhea (03 Oct 2024 13:24)  Currently admitted to medicine with the primary diagnosis of Abdominal pain      INTERVAL HPI AND OVERNIGHT EVENTS:  Patient was examined and seen at bedside. This morning he is resting comfortably in bed and reports no issues or overnight events.    REVIEW OF SYMPTOMS:  CONSTITUTIONAL: No weakness, fevers or chills; No headaches  EYES: No visual changes, eye pain, or discharge  ENT: No vertigo; No ear pain or change in hearing; No sore throat or difficulty swallowing  NECK: No pain or stiffness  RESPIRATORY: No cough, wheezing, or hemoptysis; No shortness of breath  CARDIOVASCULAR: No chest pain or palpitations  GASTROINTESTINAL: No abdominal or epigastric pain; No nausea, vomiting, or hematemesis; No diarrhea or constipation; No melena or hematochezia  GENITOURINARY: No dysuria, frequency or hematuria  MUSCULOSKELETAL: No joint pain, no muscle pain, no weakness  NEUROLOGICAL: No numbness or weakness  SKIN: No itching or rashes    OBJECTIVE  PAST MEDICAL & SURGICAL HISTORY  HTN (hypertension)    HLD (hyperlipidemia)    Kidney stone    No significant past surgical history      ALLERGIES:  No Known Allergies    MEDICATIONS:  STANDING MEDICATIONS  chlorhexidine 2% Cloths 1 Application(s) Topical daily  dronabinol 2.5 milliGRAM(s) Oral three times a day  heparin   Injectable 5000 Unit(s) SubCutaneous every 12 hours  lactobacillus acidophilus 1 Tablet(s) Oral daily  magnesium gluconate 500 milliGRAM(s) Oral at bedtime  polyethylene glycol 3350 17 Gram(s) Oral every 24 hours  senna 2 Tablet(s) Oral at bedtime  tamsulosin 0.4 milliGRAM(s) Oral daily    PRN MEDICATIONS  acetaminophen     Tablet .. 650 milliGRAM(s) Oral every 6 hours PRN  loperamide 2 milliGRAM(s) Oral every 6 hours PRN  oxyCODONE    IR 5 milliGRAM(s) Oral every 6 hours PRN      VITAL SIGNS: Last 24 Hours  T(C): 37.2 (04 Oct 2024 04:45), Max: 37.2 (04 Oct 2024 04:45)  T(F): 98.9 (04 Oct 2024 04:45), Max: 98.9 (04 Oct 2024 04:45)  HR: 104 (04 Oct 2024 05:46) (97 - 109)  BP: 135/80 (04 Oct 2024 05:46) (124/67 - 135/80)  BP(mean): 58 (04 Oct 2024 04:45) (58 - 58)  RR: 18 (04 Oct 2024 04:45) (17 - 18)  SpO2: 91% (04 Oct 2024 04:45) (91% - 98%)    LABS:                        9.6    5.12  )-----------( 96       ( 04 Oct 2024 08:13 )             26.7     10-04    139  |  99  |  24[H]  ----------------------------<  132[H]  3.9   |  24  |  0.7    Ca    8.1[L]      04 Oct 2024 08:13  Phos  2.5     10-04  Mg     2.1     10-04    TPro  4.6[L]  /  Alb  2.5[L]  /  TBili  7.0[H]  /  DBili  x   /  AST  289[H]  /  ALT  81[H]  /  AlkPhos  856[H]  10-04      Urinalysis Basic - ( 04 Oct 2024 08:13 )    Color: x / Appearance: x / SG: x / pH: x  Gluc: 132 mg/dL / Ketone: x  / Bili: x / Urobili: x   Blood: x / Protein: x / Nitrite: x   Leuk Esterase: x / RBC: x / WBC x   Sq Epi: x / Non Sq Epi: x / Bacteria: x                RADIOLOGY:      PHYSICAL EXAM:  CONSTITUTIONAL: No acute distress, well-developed, well-groomed, AAOx3  HEAD: Atraumatic, normocephalic  EYES: EOM intact, PERRLA, conjunctiva and sclera clear  ENT: Supple, no masses, no thyromegaly, no bruits, no JVD; moist mucous membranes  PULMONARY: Clear to auscultation bilaterally; no wheezes, rales, or rhonchi  CARDIOVASCULAR: Regular rate and rhythm; no murmurs, rubs, or gallops  GASTROINTESTINAL: Soft, non-tender, non-distended; bowel sounds present  MUSCULOSKELETAL: 2+ peripheral pulses; no clubbing, no cyanosis, no edema  NEUROLOGY: non-focal  SKIN: No rashes or lesions; warm and dry    ASSESSMENT & PLAN   NATHANAEL MCCLENDON 79y Male  MRN#: [id number]   Hospital Day: 18d    SUBJECTIVE  Patient is a 79y old Male who presents with a chief complaint of diarrhea (03 Oct 2024 13:24)  Currently admitted to medicine with the primary diagnosis of Abdominal pain      INTERVAL HPI AND OVERNIGHT EVENTS:  Patient was examined and seen at bedside.   Reports having discomfort in his groin  Not eating much    REVIEW OF SYMPTOMS:  No nausea and vomiting  Did not eat much    OBJECTIVE  PAST MEDICAL & SURGICAL HISTORY  HTN (hypertension)    HLD (hyperlipidemia)    Kidney stone    No significant past surgical history      ALLERGIES:  No Known Allergies    MEDICATIONS:  STANDING MEDICATIONS  chlorhexidine 2% Cloths 1 Application(s) Topical daily  dronabinol 2.5 milliGRAM(s) Oral three times a day  heparin   Injectable 5000 Unit(s) SubCutaneous every 12 hours  lactobacillus acidophilus 1 Tablet(s) Oral daily  magnesium gluconate 500 milliGRAM(s) Oral at bedtime  polyethylene glycol 3350 17 Gram(s) Oral every 24 hours  senna 2 Tablet(s) Oral at bedtime  tamsulosin 0.4 milliGRAM(s) Oral daily    PRN MEDICATIONS  acetaminophen     Tablet .. 650 milliGRAM(s) Oral every 6 hours PRN  loperamide 2 milliGRAM(s) Oral every 6 hours PRN  oxyCODONE    IR 5 milliGRAM(s) Oral every 6 hours PRN      VITAL SIGNS: Last 24 Hours  T(C): 37.2 (04 Oct 2024 04:45), Max: 37.2 (04 Oct 2024 04:45)  T(F): 98.9 (04 Oct 2024 04:45), Max: 98.9 (04 Oct 2024 04:45)  HR: 104 (04 Oct 2024 05:46) (97 - 109)  BP: 135/80 (04 Oct 2024 05:46) (124/67 - 135/80)  BP(mean): 58 (04 Oct 2024 04:45) (58 - 58)  RR: 18 (04 Oct 2024 04:45) (17 - 18)  SpO2: 91% (04 Oct 2024 04:45) (91% - 98%)    LABS:                        9.6    5.12  )-----------( 96       ( 04 Oct 2024 08:13 )             26.7     10-04    139  |  99  |  24[H]  ----------------------------<  132[H]  3.9   |  24  |  0.7    Ca    8.1[L]      04 Oct 2024 08:13  Phos  2.5     10-04  Mg     2.1     10-04    TPro  4.6[L]  /  Alb  2.5[L]  /  TBili  7.0[H]  /  DBili  x   /  AST  289[H]  /  ALT  81[H]  /  AlkPhos  856[H]  10-04      Urinalysis Basic - ( 04 Oct 2024 08:13 )    Color: x / Appearance: x / SG: x / pH: x  Gluc: 132 mg/dL / Ketone: x  / Bili: x / Urobili: x   Blood: x / Protein: x / Nitrite: x   Leuk Esterase: x / RBC: x / WBC x   Sq Epi: x / Non Sq Epi: x / Bacteria: x    PHYSICAL EXAM:  CONSTITUTIONAL: No acute distress, well-developed, well-groomed, AAOx3  PULMONARY: Clear to auscultation bilaterally; no wheezes, rales, or rhonchi  CARDIOVASCULAR: Regular rate and rhythm; no murmurs, rubs, or gallops  GASTROINTESTINAL: Soft, non-tender, non-distended; bowel sounds present  MUSCULOSKELETAL: 2+ peripheral pulses; no clubbing, no cyanosis, no edema  NEUROLOGY: non-focal  SKIN: No rashes or lesions; warm and dry

## 2024-10-04 NOTE — PROGRESS NOTE ADULT - ATTENDING COMMENTS
The patient was seen. Agree with above.  T bili is trending up and elevated AST and ALT. Will hold chemo.  Check abdominal US.  Supportive care.

## 2024-10-04 NOTE — PROGRESS NOTE ADULT - SUBJECTIVE AND OBJECTIVE BOX
SUBJECTIVE/OVERNIGHT EVENTS  Today is hospital day 18d. This morning patient was seen and examined at bedside, resting in bed. No acute or major events overnight. Pt says he feels the same. This morning pt became hypoxic to 86 on 2L, improved to 92 on 4L, pt also tachycardic.     MEDICATIONS  STANDING MEDICATIONS  cefepime   IVPB 2000 milliGRAM(s) IV Intermittent every 12 hours  cefepime   IVPB      cefepime   IVPB 2000 milliGRAM(s) IV Intermittent once  chlorhexidine 2% Cloths 1 Application(s) Topical daily  dronabinol 2.5 milliGRAM(s) Oral three times a day  heparin   Injectable 5000 Unit(s) SubCutaneous every 12 hours  lactobacillus acidophilus 1 Tablet(s) Oral daily  magnesium gluconate 500 milliGRAM(s) Oral at bedtime  polyethylene glycol 3350 17 Gram(s) Oral every 24 hours  senna 2 Tablet(s) Oral at bedtime  tamsulosin 0.4 milliGRAM(s) Oral daily    PRN MEDICATIONS  acetaminophen     Tablet .. 650 milliGRAM(s) Oral every 6 hours PRN  loperamide 2 milliGRAM(s) Oral every 6 hours PRN  oxyCODONE    IR 5 milliGRAM(s) Oral every 6 hours PRN    VITALS  T(F): 98.9 (10-04-24 @ 04:45), Max: 98.9 (10-04-24 @ 04:45)  HR: 117 (10-04-24 @ 10:35) (97 - 117)  BP: 135/80 (10-04-24 @ 05:46) (124/67 - 135/80)  RR: 18 (10-04-24 @ 10:35) (17 - 18)  SpO2: 94% (10-04-24 @ 10:35) (87% - 98%)    PHYSICAL EXAM  GENERAL: NAD, lying in bed   HEAD:  Atraumatic, normocephalic  EYES: EOMI, sclera yellow  ENT: Moist mucous membranes  NECK: Supple, no JVD  HEART: Regular rate and rhythm  LUNGS: Unlabored respirations.  Clear to auscultation bilaterally  ABDOMEN: Abd firm, liver palpable, non-distended  EXTREMITIES: 2+ peripheral pulses bilaterally. No clubbing, cyanosis, or edema  NERVOUS SYSTEM:  A&Ox3, no focal deficits   SKIN: jaundice     LABS             9.6    5.12  )-----------( 96       ( 10-04-24 @ 08:13 )             26.7     139  |  99  |  24  -------------------------<  132   10-04-24 @ 08:13  3.9  |  24  |  0.7    Ca      8.1     10-04-24 @ 08:13  Phos   2.5     10-04-24 @ 08:13  Mg     2.1     10-04-24 @ 08:13    TPro  4.6  /  Alb  2.5  /  TBili  7.0  /  DBili  x   /  AST  289  /  ALT  81  /  AlkPhos  856  /  GGT  x     10-04-24 @ 08:13        Urinalysis Basic - ( 04 Oct 2024 08:13 )    Color: x / Appearance: x / SG: x / pH: x  Gluc: 132 mg/dL / Ketone: x  / Bili: x / Urobili: x   Blood: x / Protein: x / Nitrite: x   Leuk Esterase: x / RBC: x / WBC x   Sq Epi: x / Non Sq Epi: x / Bacteria: x          IMAGING

## 2024-10-04 NOTE — PROGRESS NOTE ADULT - ASSESSMENT
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  Patient ate out on 9/6 and began to experience abdominal pain and diarrhea. Denies any vomiting but endorses nausea, notes having 30-40 loose stools over the past week and over the past day he's had 3-5 loose stools. No melena, no hematochezia, describes stools as watery and filled with mucus. No prior history of this in the past, last colonoscopy was 8 years ago and was normal per patient. Notes 8/10 epigastric and periumbilical pain. Decreased appetite due to pain and diarrhea. No fevers, no chills, no sick contacts, does not endorse eating anything out of the ordinary, does not recall his meal prior to the onset of symptoms. No cp, sob, no loc, no acute vision or hearing changes. No other ros symptoms reported.     #Bladder mass, bladder carcinoma  - Dense irregularity within the urinary bladder measuring up to 5.3 cm   - MR abdomen: liver mets, lymph node involvement, numerous foci of osseous mets, 3.9 cm enhancing lobulated soft tissue bladder mass suspicious for neoplasm  - IR bx 9/23: remarkable for cytopathology consistent w/ bladder cancer  - Heme-onc aware.- S/P D1 of  Gemcitabine 650 mg/m2 and Carboplatin AUC 4 (dose reduced), given his elevated bilirubin, on 9/28  -Pt to undergo treatment w/ heme-onc per family decision  - urology evaluated - o/p f/u  - due for next dose of Gemcitabine on 10/4/24     #Liver mets   #transaminitis  #Diarrhea-->resolved  - RUQ US: cirrhotic liver  - lipase 81  - hepatitis panel negative  - GI PCR panel negative, C. Diff negative  - AFB ngtd, bcx ngtd, ua negative   - Not on antibx  - Hepatology consult noted  - CA 19-9 +ve, AFP -ve, HIV -ve, IgM 27  - CLD workup neg   - c/w oxycodone 5mg q6hr added for pain control   - trend t bili   - trend AST/ALT     #Tachypnea  #Hpoxia  - pt was tachycardic 9/26  - EKG obtained: showed sinus tach  - pt desaturated to 90% on RA.  Further desturated 9/29. On 4L NC   - CXR (9/28): Increasing right basilar opacity/effusion and interstitial opacities.   - Repeat CXR (9/30): unchanged  - d-dimer 1119  - V/q scan inclusive for PE. ?subsegmental PE  - heparin subQ restarted  - c/w lasix 40mg for pleural effusions on CXR  - currently on 2L NC. Will try to titrate to room air   - on 10/4 AM: hypoxic to 86 on 2L, improved to 92 on 4L, pt also tachycardic  - f/u CTPE  - start cefepime  - f/u MRSA    #dysuria  #urinary urgency  - UA +ketones, protein, small LE, blood - unchanged from prior  - has frequent urge to urinate but unable to urinate large volume  - c/w flomax     #hematuria  - MRI Abd 9/19 neg for hydro  - Uro: Recommend OP f/u w/  for cystoscopy and further hematuria w/u when medically optimal    #KANWAL, HAGMA (resolved)  - suspect HAGMA in setting of KANWAL  - KANWAL likely pre-renal from dehydration     #HTN  - currently on no meds- BP WNL   - hold ACE in setting of KANWAL    #HLD  - hold statin due to elevated LFTs    #Hx of Nephrolithiasis   - notes passing kidney stones in past and had one recently     #MISC  DVT proph: heparin   GI ppx - no ppi  Diet: regular, low fiber   discussed GOC: full code     #PENDING  - due for next dose of Gemcitabine on 10/4/24   - trend t bili   - trend AST/ALT   - c/w lasix 40mg for pleural effusions on CXR  - currently on 2L NC. Will try to titrate to room air   - on 10/4 AM: hypoxic to 86 on 2L, improved to 92 on 4L, pt also tachycardic  - f/u CTPE  - start cefepime  - f/u MRSA  - daily CBC with diff, CMP

## 2024-10-05 NOTE — PROGRESS NOTE ADULT - ATTENDING COMMENTS
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  Patient ate out on 9/6 and began to experience abdominal pain and diarrhea. Denies any vomiting but endorses nausea, notes having 30-40 loose stools over the past week and over the past day he's had 3-5 loose stools. No melena, no hematochezia, describes stools as watery and filled with mucus. No prior history of this in the past, last colonoscopy was 8 years ago and was normal per patient. Notes 8/10 epigastric and periumbilical pain. Decreased appetite due to pain and diarrhea. No fevers, no chills, no sick contacts, does not endorse eating anything out of the ordinary, does not recall his meal prior to the onset of symptoms. No cp, sob, no loc, no acute vision or hearing changes. No other ros symptoms reported.       #Metastatic bladder cancer with liver mets   #Gross Hematuria   #Pseudocirrhosis   - Dense irregularity within the urinary bladder measuring up to 5.3 cm   - MR abdomen --> Enlarged heterogeneous liver with innumerable bilobar hepatic masses, suspicious for metastatic disease. Multiple subcentimeter and mildly enlarged upper abdominal lymph nodes, as detailed above, suspicious for lymph node involvement. Numerous foci of osseous metastasis, as detailed above. Partially imaged 3.9 cm enhancing lobulated soft tissue mass within the urinary bladder, suspicious for neoplasm.  concerning for an underlying lesion. No hydronephrosis.  - Pt s/p biopsy with IR 9/23: remarkable for cytopathology consistent w/ bladder cancer  - S/P D1 of  Gemcitabine 650 mg/m2 and Carboplatin AUC 4 (dose reduced), given his elevated bilirubin, on 9/28, due for next dose of Gemcitabine on 10/4/24   - Uro: Recommend OP f/u w/  for cystoscopy and further hematuria w/u when medically optimal  - pain control   - DVT ppx     #Diarrhea  (resolved)  - GI PCR panel negative, C. Diff negative    #Acute hypoxemic respiratory failure - worsening, today requiring 4L, saturating 92%  #Fluid Overload   #Sinus tachcardia   - CT PE showing artifact vs small PE (less likely)   - LE Duplex negative   - c/w Lasix 40mg IV daily   - start broad spectrum abx and check CT PE to rule out PE   - MRSA nares negative      #KANWAL, resolved     #HLD  - hold statin due to elevated LFTs    #Nephrolithiasis   - notes passing kidney stones in past and had one recently     DVT proph: heparin    Grave Prognosis    Pending: chemo tomorrow, CT PE protocol   Plan of care d/w pt

## 2024-10-05 NOTE — PROGRESS NOTE ADULT - ASSESSMENT
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  Patient ate out on 9/6 and began to experience abdominal pain and diarrhea. Denies any vomiting but endorses nausea, notes having 30-40 loose stools over the past week and over the past day he's had 3-5 loose stools. No melena, no hematochezia, describes stools as watery and filled with mucus. No prior history of this in the past, last colonoscopy was 8 years ago and was normal per patient. Notes 8/10 epigastric and periumbilical pain. Decreased appetite due to pain and diarrhea. No fevers, no chills, no sick contacts, does not endorse eating anything out of the ordinary, does not recall his meal prior to the onset of symptoms. No cp, sob, no loc, no acute vision or hearing changes. No other ros symptoms reported.     #Acute hypoxemic respiratory failure 2/2 RUL infiltrate with fibrotic changes   #Bilateral R > L pleural effusions   #Multiple small pulmonary nodules   #Left pulmonary embolism   - LE Duplex negative   - s/p trial of Lasix   on HFNC 40/40  Repeat CT noted - Worsening infiltrates/fibrotic changes on the right   c/w Cefepime; nasal MRSA negative;    On Solumedrol 60 BID; wean off slowly   Not on AC due to gross hematuria, worsening thrombocytopenia     #Metastatic bladder cancer with liver mets   #Gross Hematuria   #Pseudocirrhosis   - Dense irregularity within the urinary bladder measuring up to 5.3 cm   - MR abdomen --> Enlarged heterogeneous liver with innumerable bilobar hepatic masses, suspicious for metastatic disease. Multiple subcentimeter and mildly enlarged upper abdominal lymph nodes, as detailed above, suspicious for lymph node involvement. Numerous foci of osseous metastasis, as detailed above. Partially imaged 3.9 cm enhancing lobulated soft tissue mass within the urinary bladder, suspicious for neoplasm.  concerning for an underlying lesion. No hydronephrosis.  - Pt s/p biopsy with IR 9/23: remarkable for cytopathology consistent w/ bladder cancer  - S/P D1 of  Gemcitabine 650 mg/m2 and Carboplatin AUC 4 (dose reduced), given his elevated bilirubin, on 9/28, due for next dose of Gemcitabine on 10/4/24 however postponed   - Uro: Recommend OP f/u w/  for cystoscopy and further hematuria w/u when medically optimal  - pain control   - DVT ppx     #Diarrhea  (resolved)  - GI PCR panel negative, C. Diff negative    #KANWAL, resolved     #HLD  - hold statin due to elevated LFTs    #Nephrolithiasis   - notes passing kidney stones in past and had one recently     DVT proph: heparin    Grave Prognosis    Pending: clinical improvement   Plan of care d/w pt  Dispo: [email or web address]

## 2024-10-05 NOTE — CONSULT NOTE ADULT - SUBJECTIVE AND OBJECTIVE BOX
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  Patient ate out on 9/6 and began to experience abdominal pain and diarrhea. Denies any vomiting but endorses nausea, notes having 30-40 loose stools over the past week and over the past day he's had 3-5 loose stools. No melena, no hematochezia, describes stools as watery and filled with mucus. No prior history of this in the past, last colonoscopy was 8 years ago and was normal per patient. Notes 8/10 epigastric and periumbilical pain. Decreased appetite due to pain and diarrhea. No fevers, no chills, no sick contacts, does not endorse eating anything out of the ordinary, does not recall his meal prior to the onset of symptoms. No cp, sob, no loc, no acute vision or hearing changes. No other ros symptoms reported.     ED course:   bp: 92 mm Hg/55 mm Hg  hr: 93 /min  rr: 18 /min  temp;97.7 Degrees F  oral  spo2: 96 %    wbc elevated 12k  lactate 4.4-->3.5-->2.2  cr 2.4 (unknown baseline)     CTAP w/IV contrast:     01. LIVER: Cirrhotic morphology. Hepatic steatosis.  02. SPLEEN: Normal unenhanced.  03. PANCREAS: Atrophic.  04. GALLBLADDER/BILIARY TREE: Gallbladder not definitively visualized.    No biliary duct dilatation.  05. ADRENALS: Normal.  06. KIDNEYS: Symmetric in size.  No hydronephrosis. Bilateral   nonobstructing renal stones measuring 1.5 x 0.7 cm and left renal pole   (series and 601 image 49) and 0.3 cm in the right renal lower pole   (series 3 image 142).  07. LYMPHADENOPATHY/RETROPERITONEUM: Prominent retroperitoneal lymph   nodes measuring upto 1 cm (series 4 image 43).  08. BOWEL: No bowel obstruction.  09. PELVIC VISCERA: Dense irregularity within the urinary bladder   measuring 5.3 x 4.6 cm (series 4 image 89). Prostate measures 3.7 cm in   transverse dimension (series 4 image 93).  10. PELVIC LYMPH NODES: No enlarged lymph nodes.  11. VASCULATURE: Diffuse calcified atherosclerotic disease of the aorta   and its branches. Multiple peripherally calcified vessels at the level of   the mid pancrea    impression:   No bowel obstruction.    Dense irregularity within the urinary bladder measuring up to 5.3 cm   concerning for an underlying lesion. No hydronephrosis.    Prominent retroperitoneal lymph nodes measuring up to 1 cm.    Cirrhotic morphology of the liver. Hepatic steatosis.    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     (17 Sep 2024 03:44)      PAST MEDICAL & SURGICAL HISTORY:  HTN (hypertension)      HLD (hyperlipidemia)      Kidney stone      No significant past surgical history          SOCIAL HX:   Smoking                         ETOH                            Other    FAMILY HISTORY:  FH: CHF (congestive heart failure) (Father)    :  No known cardiovacular family hisotry     ROS:  See HPI     Allergies    No Known Allergies    Intolerances          PHYSICAL EXAM    ICU Vital Signs Last 24 Hrs  T(C): 37.3 (05 Oct 2024 14:09), Max: 37.5 (04 Oct 2024 20:51)  T(F): 99.1 (05 Oct 2024 14:09), Max: 99.5 (04 Oct 2024 20:51)  HR: 105 (05 Oct 2024 14:09) (105 - 110)  BP: 111/57 (05 Oct 2024 14:09) (111/57 - 143/63)  BP(mean): --  ABP: --  ABP(mean): --  RR: 18 (05 Oct 2024 14:09) (17 - 18)  SpO2: 98% (05 Oct 2024 14:09) (87% - 98%)    O2 Parameters below as of 05 Oct 2024 14:09  Patient On (Oxygen Delivery Method): nasal cannula, high flow            General: In NAD   HEENT:  EVELYN              Lymphatic system: No cervical LN   Lungs: Bilateral BS  Cardiovascular: Regular  Gastrointestinal: Soft, Positive BS  Musculoskeletal: No clubbing.  Moves all extremities.  Full range of motion   Skin: Warm.  Intact  Neurological: No motor or sensory deficit       10-04-24 @ 07:01  -  10-05-24 @ 07:00  --------------------------------------------------------  IN:    Oral Fluid: 340 mL  Total IN: 340 mL    OUT:    Voided (mL): 400 mL  Total OUT: 400 mL    Total NET: -60 mL          LABS:                          9.4    9.87  )-----------( 73       ( 05 Oct 2024 08:26 )             26.2                                               10-05    137  |  97[L]  |  23[H]  ----------------------------<  122[H]  3.9   |  25  |  0.7    Ca    8.0[L]      05 Oct 2024 08:26  Phos  2.7     10-05  Mg     2.0     10-05    TPro  4.6[L]  /  Alb  2.2[L]  /  TBili  7.5[H]  /  DBili  x   /  AST  252[H]  /  ALT  81[H]  /  AlkPhos  899[H]  10-05                                             Urinalysis Basic - ( 05 Oct 2024 08:26 )    Color: x / Appearance: x / SG: x / pH: x  Gluc: 122 mg/dL / Ketone: x  / Bili: x / Urobili: x   Blood: x / Protein: x / Nitrite: x   Leuk Esterase: x / RBC: x / WBC x   Sq Epi: x / Non Sq Epi: x / Bacteria: x                                                  LIVER FUNCTIONS - ( 05 Oct 2024 08:26 )  Alb: 2.2 g/dL / Pro: 4.6 g/dL / ALK PHOS: 899 U/L / ALT: 81 U/L / AST: 252 U/L / GGT: x                                                                                                                                       X-Rays                                                                                     ECHO    MEDICATIONS  (STANDING):  cefepime   IVPB 2000 milliGRAM(s) IV Intermittent every 12 hours  cefepime   IVPB      chlorhexidine 2% Cloths 1 Application(s) Topical daily  dronabinol 2.5 milliGRAM(s) Oral three times a day  heparin   Injectable 5000 Unit(s) SubCutaneous every 12 hours  lactobacillus acidophilus 1 Tablet(s) Oral daily  magnesium gluconate 500 milliGRAM(s) Oral at bedtime  methylPREDNISolone sodium succinate Injectable 60 milliGRAM(s) IV Push two times a day  polyethylene glycol 3350 17 Gram(s) Oral every 24 hours  senna 2 Tablet(s) Oral at bedtime  tamsulosin 0.4 milliGRAM(s) Oral daily    MEDICATIONS  (PRN):  acetaminophen     Tablet .. 650 milliGRAM(s) Oral every 6 hours PRN Severe Pain (7 - 10)  loperamide 2 milliGRAM(s) Oral every 6 hours PRN Diarrhea  oxyCODONE    IR 5 milliGRAM(s) Oral every 6 hours PRN Severe Pain (7 - 10)

## 2024-10-05 NOTE — CONSULT NOTE ADULT - CONSULT REQUESTED DATE/TIME
05-Oct-2024 14:22
01-Oct-2024 15:10
21-Sep-2024 13:19
18-Sep-2024 14:31
20-Sep-2024 13:01
17-Sep-2024 18:18

## 2024-10-05 NOTE — CONSULT NOTE ADULT - ASSESSMENT
Impression:     RUL infiltrate with fibrotic changes   Differential: Infectious, vs inflammatory vs malignant   Bilateral R > L pleural effusions   AHRF on HFNC now 40/40  Metastatic bladder cancer  Multiple small pulmonary nodules   Left pulmonary embolism     Plan:     Repeat CT noted   Worsening infiltrates/fibrotic changes on the right   Bilateral right more than left effusions noted   Check BNP again; trial of diuretics; lasix 40mg IV once  Check procal  On Cefepime; nasal MRSA negative; infiltrate worsening; switch to Zosyn   On Solumedrol 60 BID; wean off slowly   Malignant infiltrate and effusion is also a consideration   Not stable for bronchoscopy with BAL however   Remains on HFNC now down to 40/40; Trial of NC at 6LPM with goal spo2 more than 92%  On SQ heparin; consider therapeutic AC for the LLL filling defect if no further hematuria; duplex negative   Prognosis is very poor; pall care follow up   DNR; trial of intubation     Will follow

## 2024-10-05 NOTE — PROGRESS NOTE ADULT - SUBJECTIVE AND OBJECTIVE BOX
Brief Summarry:    Pt seen and examined at bedside.    VITAL SIGNS (Last 24 hrs):  T(C): 37.3 (10-05-24 @ 14:09), Max: 37.5 (10-04-24 @ 20:51)  HR: 105 (10-05-24 @ 14:09) (105 - 110)  BP: 111/57 (10-05-24 @ 14:09) (111/57 - 143/63)  RR: 18 (10-05-24 @ 14:09) (17 - 18)  SpO2: 98% (10-05-24 @ 15:45) (87% - 98%)  Wt(kg): --  Daily     Daily     I&O's Summary    04 Oct 2024 07:01  -  05 Oct 2024 07:00  --------------------------------------------------------  IN: 340 mL / OUT: 400 mL / NET: -60 mL        PHYSICAL EXAM:  GENERAL: NAD, well-developed  HEAD:  Atraumatic, Normocephalic  EYES: EOMI, PERRLA, conjunctiva and sclera clear  NECK: Supple, No JVD  CHEST/LUNG: Clear to auscultation bilaterally; No wheeze  HEART: Regular rate and rhythm; No murmurs, rubs, or gallops  ABDOMEN: Soft, Nontender, Nondistended; Bowel sounds present  EXTREMITIES:  2+ Peripheral Pulses, No clubbing, cyanosis, or edema  PSYCH: AAOx3  NEUROLOGY: non-focal  SKIN: No rashes or lesions    Labs Reviewed  Spoke to patient in regards to abnormal labs.    CBC Full  -  ( 05 Oct 2024 08:26 )  WBC Count : 9.87 K/uL  Hemoglobin : 9.4 g/dL  Hematocrit : 26.2 %  Platelet Count - Automated : 73 K/uL  Mean Cell Volume : 86.5 fL  Mean Cell Hemoglobin : 31.0 pg  Mean Cell Hemoglobin Concentration : 35.9 g/dL  Auto Neutrophil # : 9.02 K/uL  Auto Lymphocyte # : 0.63 K/uL  Auto Monocyte # : 0.11 K/uL  Auto Eosinophil # : 0.00 K/uL  Auto Basophil # : 0.02 K/uL  Auto Neutrophil % : 91.4 %  Auto Lymphocyte % : 6.4 %  Auto Monocyte % : 1.1 %  Auto Eosinophil % : 0.0 %  Auto Basophil % : 0.2 %    BMP:    10-05 @ 08:26    Blood Urea Nitrogen - 23  Calcium - 8.0  Carbond Dioxide - 25  Chloride - 97  Creatinine - 0.7  Glucose - 122  Potassium - 3.9  Sodium - 137      Hemoglobin A1c -     Urine Culture:            MEDICATIONS  (STANDING):  cefepime   IVPB 2000 milliGRAM(s) IV Intermittent every 12 hours  cefepime   IVPB      chlorhexidine 2% Cloths 1 Application(s) Topical daily  dronabinol 2.5 milliGRAM(s) Oral three times a day  heparin   Injectable 5000 Unit(s) SubCutaneous every 8 hours  lactobacillus acidophilus 1 Tablet(s) Oral daily  magnesium gluconate 500 milliGRAM(s) Oral at bedtime  methylPREDNISolone sodium succinate Injectable 60 milliGRAM(s) IV Push two times a day  polyethylene glycol 3350 17 Gram(s) Oral every 24 hours  senna 2 Tablet(s) Oral at bedtime  tamsulosin 0.4 milliGRAM(s) Oral daily    MEDICATIONS  (PRN):  acetaminophen     Tablet .. 650 milliGRAM(s) Oral every 6 hours PRN Severe Pain (7 - 10)  loperamide 2 milliGRAM(s) Oral every 6 hours PRN Diarrhea  oxyCODONE    IR 5 milliGRAM(s) Oral every 6 hours PRN Severe Pain (7 - 10)      Pt seen and examined at bedside. Reports periods of SOB. Also reports fatigue and anorexia.       VITAL SIGNS (Last 24 hrs):  T(C): 37.3 (10-05-24 @ 14:09), Max: 37.5 (10-04-24 @ 20:51)  HR: 105 (10-05-24 @ 14:09) (105 - 110)  BP: 111/57 (10-05-24 @ 14:09) (111/57 - 143/63)  RR: 18 (10-05-24 @ 14:09) (17 - 18)  SpO2: 98% (10-05-24 @ 15:45) (87% - 98%)  Wt(kg): --  Daily     Daily     I&O's Summary    04 Oct 2024 07:01  -  05 Oct 2024 07:00  --------------------------------------------------------  IN: 340 mL / OUT: 400 mL / NET: -60 mL        PHYSICAL EXAM:  GENERAL: NAD, ill appearing male   HEAD:  Atraumatic, Normocephalic  EYES:  conjunctiva and sclera clear  NECK: Supple, No JVD  CHEST/LUNG: right sided crackles   HEART: Regular rate and rhythm; No murmurs, rubs, or gallops  ABDOMEN: Soft, Nontender, Nondistended; Bowel sounds present  EXTREMITIES:  2+ Peripheral Pulses, No clubbing, cyanosis + trace edema  PSYCH: AAOx3  NEUROLOGY: non-focal  SKIN: No rashes or lesions    Labs Reviewed  Spoke to patient in regards to abnormal labs.    CBC Full  -  ( 05 Oct 2024 08:26 )  WBC Count : 9.87 K/uL  Hemoglobin : 9.4 g/dL  Hematocrit : 26.2 %  Platelet Count - Automated : 73 K/uL  Mean Cell Volume : 86.5 fL  Mean Cell Hemoglobin : 31.0 pg  Mean Cell Hemoglobin Concentration : 35.9 g/dL  Auto Neutrophil # : 9.02 K/uL  Auto Lymphocyte # : 0.63 K/uL  Auto Monocyte # : 0.11 K/uL  Auto Eosinophil # : 0.00 K/uL  Auto Basophil # : 0.02 K/uL  Auto Neutrophil % : 91.4 %  Auto Lymphocyte % : 6.4 %  Auto Monocyte % : 1.1 %  Auto Eosinophil % : 0.0 %  Auto Basophil % : 0.2 %    BMP:    10-05 @ 08:26    Blood Urea Nitrogen - 23  Calcium - 8.0  Carbond Dioxide - 25  Chloride - 97  Creatinine - 0.7  Glucose - 122  Potassium - 3.9  Sodium - 137      Hemoglobin A1c -     Urine Culture:            MEDICATIONS  (STANDING):  cefepime   IVPB 2000 milliGRAM(s) IV Intermittent every 12 hours  cefepime   IVPB      chlorhexidine 2% Cloths 1 Application(s) Topical daily  dronabinol 2.5 milliGRAM(s) Oral three times a day  heparin   Injectable 5000 Unit(s) SubCutaneous every 8 hours  lactobacillus acidophilus 1 Tablet(s) Oral daily  magnesium gluconate 500 milliGRAM(s) Oral at bedtime  methylPREDNISolone sodium succinate Injectable 60 milliGRAM(s) IV Push two times a day  polyethylene glycol 3350 17 Gram(s) Oral every 24 hours  senna 2 Tablet(s) Oral at bedtime  tamsulosin 0.4 milliGRAM(s) Oral daily    MEDICATIONS  (PRN):  acetaminophen     Tablet .. 650 milliGRAM(s) Oral every 6 hours PRN Severe Pain (7 - 10)  loperamide 2 milliGRAM(s) Oral every 6 hours PRN Diarrhea  oxyCODONE    IR 5 milliGRAM(s) Oral every 6 hours PRN Severe Pain (7 - 10)

## 2024-10-06 NOTE — PROGRESS NOTE ADULT - ASSESSMENT
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  Patient ate out on 9/6 and began to experience abdominal pain and diarrhea. Denies any vomiting but endorses nausea, notes having 30-40 loose stools over the past week and over the past day he's had 3-5 loose stools. No melena, no hematochezia, describes stools as watery and filled with mucus. No prior history of this in the past, last colonoscopy was 8 years ago and was normal per patient. Notes 8/10 epigastric and periumbilical pain. Decreased appetite due to pain and diarrhea. No fevers, no chills, no sick contacts, does not endorse eating anything out of the ordinary, does not recall his meal prior to the onset of symptoms. No cp, sob, no loc, no acute vision or hearing changes. No other ros symptoms reported.       #Acute hypoxemic respiratory failure 2/2 RUL infiltrate   #Bilateral pleural effusions   #Multiple lung mets 2/2 bladder CA  - pt was tachycardic 9/26  - EKG obtained: showed sinus tach  - pt desaturated to 90% on RA.  Further desturated 9/29. On 4L NC   - CXR (9/28): Increasing right basilar opacity/effusion and interstitial opacities.   - Repeat CXR (9/30): unchanged  - d-dimer 1119  - V/q scan inclusive for PE. ?subsegmental PE. CT PE neg for PE   CT PE: Stable filling defect in left lower lobe segmental branches. No new   definite filling defects.Numerous stable pulmonary nodule described above. Sable cardiophrenic lymphadenopathy. nnumerable hepatic hypodensities suspicious for malignancy.  -Pt placed on High-flow due to worsening respiratory status     #Bladder mass, bladder carcinoma  - Dense irregularity within the urinary bladder measuring up to 5.3 cm   - MR abdomen: liver mets, lymph node involvement, numerous foci of osseous mets, 3.9 cm enhancing lobulated soft tissue bladder mass suspicious for neoplasm  - IR bx 9/23: remarkable for cytopathology consistent w/ bladder cancer  - Heme-onc aware.- S/P D1 of  Gemcitabine 650 mg/m2 and Carboplatin AUC 4 (dose reduced), given his elevated bilirubin, on 9/28. Next dose of chemo for 10/4 postponed   -Pt to undergo treatment w/ heme-onc per family decision  - urology evaluated - o/p f/u  - due for next dose of Gemcitabine on 10/4/24. Postponed. Heme-onc to reevaluate 10/7   - c/w cefepime  -On solumedrol 60BID. Will taper if possible  - MRSA neg     #Liver mets   #transaminitis  #Diarrhea-->resolved  - RUQ US: cirrhotic liver  - lipase 81  - hepatitis panel negative  - GI PCR panel negative, C. Diff negative  - AFB ngtd, bcx ngtd, ua negative   - Not on antibx  - Hepatology consult noted  - CA 19-9 +ve, AFP -ve, HIV -ve, IgM 27  - CLD workup neg   - c/w oxycodone 5mg q6hr added for pain control   - trend t bili   - trend AST/ALT       #dysuria  #urinary urgency  - UA +ketones, protein, small LE, blood - unchanged from prior  - has frequent urge to urinate but unable to urinate large volume  - c/w flomax     #hematuria  - MRI Abd 9/19 neg for hydro  - Uro: Recommend OP f/u w/  for cystoscopy and further hematuria w/u when medically optimal  -A/c held     #KANWAL, HAGMA (resolved)  - suspect HAGMA in setting of KANWAL  - KANWAL likely pre-renal from dehydration     #HTN  - currently on no meds- BP WNL   - hold ACE in setting of KANWAL    #HLD  - hold statin due to elevated LFTs    #Hx of Nephrolithiasis   - notes passing kidney stones in past and had one recently     #MISC  DVT proph: None -hematuria  GIppx - no ppi  Diet: regular, low fiber   discussed GOC: full code     #PENDING  - trend t bili   - trend AST/ALT   - daily CBC with diff, CMP  -f/u heme-onc recs        77 y/o male w/pmhx of HTN, HLD, 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  Patient ate out on 9/6 and began to experience abdominal pain and diarrhea. Denies any vomiting but endorses nausea, notes having 30-40 loose stools over the past week and over the past day he's had 3-5 loose stools. No melena, no hematochezia, describes stools as watery and filled with mucus. No prior history of this in the past, last colonoscopy was 8 years ago and was normal per patient. Notes 8/10 epigastric and periumbilical pain. Decreased appetite due to pain and diarrhea. No fevers, no chills, no sick contacts, does not endorse eating anything out of the ordinary, does not recall his meal prior to the onset of symptoms. No cp, sob, no loc, no acute vision or hearing changes. No other ros symptoms reported.       #Acute hypoxemic respiratory failure 2/2 RUL infiltrate   #Bilateral pleural effusions   #Multiple lung mets 2/2 bladder CA  - pt was tachycardic 9/26  - EKG obtained: showed sinus tach  - pt desaturated to 90% on RA.  Further desturated 9/29. On 4L NC   - CXR (9/28): Increasing right basilar opacity/effusion and interstitial opacities.   - Repeat CXR (9/30): unchanged  - d-dimer 1119  - V/q scan inclusive for PE. ?subsegmental PE. CT PE neg for PE   CT PE: Stable filling defect in left lower lobe segmental branches. No new   definite filling defects.Numerous stable pulmonary nodule described above. Sable cardiophrenic lymphadenopathy. nnumerable hepatic hypodensities suspicious for malignancy.  -Pt placed on High-flow due to worsening respiratory status     #Bladder mass, bladder carcinoma  - Dense irregularity within the urinary bladder measuring up to 5.3 cm   - MR abdomen: liver mets, lymph node involvement, numerous foci of osseous mets, 3.9 cm enhancing lobulated soft tissue bladder mass suspicious for neoplasm  - IR bx 9/23: remarkable for cytopathology consistent w/ bladder cancer  - Heme-onc aware.- S/P D1 of  Gemcitabine 650 mg/m2 and Carboplatin AUC 4 (dose reduced), given his elevated bilirubin, on 9/28. Next dose of chemo for 10/4 postponed   -Pt to undergo treatment w/ heme-onc per family decision  - urology evaluated - o/p f/u  - due for next dose of Gemcitabine on 10/4/24. Postponed. Heme-onc to reevaluate 10/7   - c/w cefepime  -On solumedrol 60BID. Will taper if possible  - MRSA neg     #Liver mets   #transaminitis  #Diarrhea-->resolved  - RUQ US: cirrhotic liver  - lipase 81  - hepatitis panel negative  - GI PCR panel negative, C. Diff negative  - AFB ngtd, bcx ngtd, ua negative   - Not on antibx  - Hepatology consult noted  - CA 19-9 +ve, AFP -ve, HIV -ve, IgM 27  - CLD workup neg   - c/w oxycodone 5mg q6hr added for pain control   - trend t bili   - trend AST/ALT       #dysuria  #urinary urgency  - UA +ketones, protein, small LE, blood - unchanged from prior  - has frequent urge to urinate but unable to urinate large volume  - c/w flomax     #hematuria  - MRI Abd 9/19 neg for hydro  - Uro: Recommend OP f/u w/  for cystoscopy and further hematuria w/u when medically optimal    #KANWAL, HAGMA (resolved)  - suspect HAGMA in setting of KANWAL  - KANWAL likely pre-renal from dehydration     #HTN  - currently on no meds- BP WNL   - hold ACE in setting of KANWAL    #HLD  - hold statin due to elevated LFTs    #Hx of Nephrolithiasis   - notes passing kidney stones in past and had one recently     #MISC  DVT proph: None -hematuria  GIppx - no ppi  Diet: regular, low fiber   discussed GOC: full code     #PENDING  - trend t bili   - trend AST/ALT   - daily CBC with diff, CMP  -f/u heme-onc recs

## 2024-10-06 NOTE — PROVIDER CONTACT NOTE (OTHER) - ACTION/TREATMENT ORDERED:
MD Hernandez came to bedside to assess patient. Rapid response called at [phone]
Provider notified via teams & bedside to assess.

## 2024-10-06 NOTE — PROGRESS NOTE ADULT - ATTENDING COMMENTS
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  Patient ate out on 9/6 and began to experience abdominal pain and diarrhea. Denies any vomiting but endorses nausea, notes having 30-40 loose stools over the past week and over the past day he's had 3-5 loose stools. No melena, no hematochezia, describes stools as watery and filled with mucus. No prior history of this in the past, last colonoscopy was 8 years ago and was normal per patient. Notes 8/10 epigastric and periumbilical pain. Decreased appetite due to pain and diarrhea. No fevers, no chills, no sick contacts, does not endorse eating anything out of the ordinary, does not recall his meal prior to the onset of symptoms. No cp, sob, no loc, no acute vision or hearing changes. No other ros symptoms reported.       #Acute hypoxemic respiratory failure 2/2 RUL infiltrate with fibrotic changes   #Bilateral R > L pleural effusions   #Multiple small pulmonary nodules   #Left pulmonary embolism   - LE Duplex negative   - s/p trial of Lasix   on HFNC 60/40L  Repeat CT noted - Worsening infiltrates/fibrotic changes on the right   c/w Cefepime; nasal MRSA negative;    On Solumedrol 60 BID; wean off slowly   Not on therapeutic AC due to gross hematuria, worsening thrombocytopenia   LE Duplex negative: not a candidate for IVC filter    #Metastatic bladder cancer with liver mets   #Gross Hematuria   #Pseudocirrhosis   - Dense irregularity within the urinary bladder measuring up to 5.3 cm   - MR abdomen --> Enlarged heterogeneous liver with innumerable bilobar hepatic masses, suspicious for metastatic disease. Multiple subcentimeter and mildly enlarged upper abdominal lymph nodes, as detailed above, suspicious for lymph node involvement. Numerous foci of osseous metastasis, as detailed above. Partially imaged 3.9 cm enhancing lobulated soft tissue mass within the urinary bladder, suspicious for neoplasm.  concerning for an underlying lesion. No hydronephrosis.  - Pt s/p biopsy with IR 9/23: remarkable for cytopathology consistent w/ bladder cancer  - S/P D1 of  Gemcitabine 650 mg/m2 and Carboplatin AUC 4 (dose reduced), given his elevated bilirubin, on 9/28, due for next dose of Gemcitabine on 10/4/24 however postponed   - Uro: Recommend OP f/u w/  for cystoscopy    - pain control   - DVT ppx     #Diarrhea  (resolved)  - GI PCR panel negative, C. Diff negative    #KANWAL, resolved     #HLD  - hold statin due to elevated LFTs    #Nephrolithiasis   - notes passing kidney stones in past and had one recently     DVT proph: heparin BID    Grave Prognosis    Pending: clinical improvement   Plan of care d/w pt  Dispo: TBD

## 2024-10-06 NOTE — PROGRESS NOTE ADULT - SUBJECTIVE AND OBJECTIVE BOX
SUBJECTIVE/OVERNIGHT EVENTS  Today is hospital day 20d. This morning patient was seen and examined at bedside, resting comfortably in bed. No acute or major events overnight.    CODE STATUS: DNR/intubate    FAMILY COMMUNICATION  Contact date:  Name of person contacted:  Relationship to patient:  Communication details:    MEDICATIONS  STANDING MEDICATIONS  cefepime   IVPB 2000 milliGRAM(s) IV Intermittent every 12 hours  cefepime   IVPB      chlorhexidine 2% Cloths 1 Application(s) Topical daily  dronabinol 2.5 milliGRAM(s) Oral three times a day  heparin   Injectable 5000 Unit(s) SubCutaneous every 8 hours  lactobacillus acidophilus 1 Tablet(s) Oral daily  magnesium gluconate 500 milliGRAM(s) Oral at bedtime  methylPREDNISolone sodium succinate Injectable 60 milliGRAM(s) IV Push two times a day  polyethylene glycol 3350 17 Gram(s) Oral every 24 hours  senna 2 Tablet(s) Oral at bedtime  tamsulosin 0.4 milliGRAM(s) Oral daily    PRN MEDICATIONS  acetaminophen     Tablet .. 650 milliGRAM(s) Oral every 6 hours PRN  loperamide 2 milliGRAM(s) Oral every 6 hours PRN  oxyCODONE    IR 5 milliGRAM(s) Oral every 6 hours PRN    VITALS  T(F): 98.6 (10-05-24 @ 21:12), Max: 99.1 (10-05-24 @ 14:09)  HR: 106 (10-05-24 @ 21:12) (105 - 110)  BP: 117/64 (10-05-24 @ 21:12) (111/57 - 138/72)  RR: 18 (10-05-24 @ 21:12) (18 - 18)  SpO2: 98% (10-05-24 @ 21:12) (87% - 98%)    PHYSICAL EXAM  GENERAL  ( x ) NAD, lying in bed comfortably     (  ) obtunded     (  ) lethargic     (  ) somnolent    HEAD  ( x ) Atraumatic     (  ) hematoma     (  ) laceration (specify location:       )     NECK  x(  ) Supple     (  ) neck stiffness     (  ) nuchal rigidity     (  )  no JVD     (  ) JVD present ( -- cm)    HEART  Rate -->  ( x ) normal rate    (  ) bradycardic    (  ) tachycardic  Rhythm -->  (x  ) regular    (  ) regularly irregular    (  ) irregularly irregular  Murmurs -->  (x  ) normal s1/s2    (  ) systolic murmur    (  ) diastolic murmur    (  ) continuous murmur     (  ) S3 present    (  ) S4 present    LUNGS  (x  )Unlabored respirations     (  ) tachypnea  ( x ) B/L air entry     (  ) decreased breath sounds in:  (location     )    (x  ) no adventitious sound     (  ) crackles     (  ) wheezing      (  ) rhonchi      (specify location:       )  (  ) chest wall tenderness (specify location:       )    ABDOMEN  (x  ) Soft     (  ) tense   |   (  ) nondistended     (x  ) distended   |   (  ) +BS     (  ) hypoactive bowel sounds     (  ) hyperactive bowel sounds  (  ) nontender     (  ) RUQ tenderness     (  ) RLQ tenderness     (  ) LLQ tenderness     (  ) epigastric tenderness     (  ) diffuse tenderness  ( x ) Splenomegaly      (  ) Hepatomegaly      (  ) Jaundice     (  ) ecchymosis     EXTREMITIES  (  ) Normal     (  ) Rash     (  ) ecchymosis     (  ) varicose veins      (x  ) pitting edema     (  ) non-pitting edema   (  ) ulceration     (  ) gangrene:     (location:     )    NERVOUS SYSTEM  ( x ) A&Ox3     (  ) confused     (  ) lethargic  CN II-XII:     (  ) Intact     (  ) focal deficits  (Specify:     )   Upper extremities:     (  ) strength X/5     (  ) focal deficit (specify:    )  Lower extremities:     (  ) strength  X/5    (  ) focal deficit (specify:    )    SKIN  ( x ) No rashes or lesions     (  ) maculopapular rash     (  ) pustules     (  ) vesicles     (  ) ulcer     (  ) ecchymosis     (specify location:     )    (  ) Indwelling [place] Catheter   Date insterted:    Reason (  ) Critical illness     (  ) urinary retention    (  ) Accurate Ins/Outs Monitoring     (  ) CMO patient    (  ) Central Line  Date inserted:  Location: (  ) Right IJ   (  ) Left IJ   (  ) Right Fem   (  ) Left Fem    (  ) SPC  (  ) pigtail  (  ) PEG tube  (  ) colostomy  (  ) jejunostomy  (  ) U-Dall    LABS             9.4    9.87  )-----------( 73       ( 10-05-24 @ 08:26 )             26.2     137  |  97  |  23  -------------------------<  122   10-05-24 @ 08:26  3.9  |  25  |  0.7    Ca      8.0     10-05-24 @ 08:26  Phos   2.7     10-05-24 @ 08:26  Mg     2.0     10-05-24 @ 08:26    TPro  4.6  /  Alb  2.2  /  TBili  7.5  /  DBili  x   /  AST  252  /  ALT  81  /  AlkPhos  899  /  GGT  x     10-05-24 @ 08:26        Urinalysis Basic - ( 05 Oct 2024 08:26 )    Color: x / Appearance: x / SG: x / pH: x  Gluc: 122 mg/dL / Ketone: x  / Bili: x / Urobili: x   Blood: x / Protein: x / Nitrite: x   Leuk Esterase: x / RBC: x / WBC x   Sq Epi: x / Non Sq Epi: x / Bacteria: x          IMAGING

## 2024-10-06 NOTE — PROVIDER CONTACT NOTE (OTHER) - SITUATION
patient desaturation to 88% on RA
Patient had a dark tarry colored bowel movement. Patient noted to be desaturating

## 2024-10-06 NOTE — PROGRESS NOTE ADULT - PROVIDER SPECIALTY LIST ADULT
Heme/Onc
Heme/Onc
Hepatology
Hospitalist
Internal Medicine
Heme/Onc
Heme/Onc
Hepatology
Hepatology
Hospitalist
Internal Medicine
Heme/Onc
Internal Medicine
Urology
Urology
Internal Medicine
Urology

## 2024-10-06 NOTE — PROGRESS NOTE ADULT - ATTENDING SUPERVISION STATEMENT
Resident
Fellow
Resident
Fellow
Resident
Fellow
Resident
Fellow
Resident
Fellow
Fellow

## 2024-10-06 NOTE — DISCHARGE NOTE FOR THE EXPIRED PATIENT - HOSPITAL COURSE
This 78-year-old male with a history of hypertension, hyperlipidemia, and nephrolithiasis presented with gastrointestinal complaints that led to a diagnosis of metastatic bladder cancer complicated by acute hypoxemic respiratory failure, likely due to both his malignancy and possible infection. Despite aggressive medical management, including chemotherapy initiation, his condition deteriorated, and he  in the hospital. Cause of death was respiratory failure. This 78-year-old male with a history of hypertension, hyperlipidemia, and nephrolithiasis presented with gastrointestinal complaints that led to a diagnosis of metastatic bladder cancer complicated by acute hypoxemic respiratory failure, likely due to both his malignancy and possible infection. Despite aggressive medical management, including chemotherapy initiation, his condition deteriorated. On 10/6 his O2 sat dropped requiring intubation and he subsequently . This 78-year-old male with a history of hypertension, hyperlipidemia, and nephrolithiasis presented with gastrointestinal complaints that led to a diagnosis of metastatic bladder cancer complicated by acute hypoxemic respiratory failure, likely due to both his malignancy and possible infection. Despite aggressive medical management, including chemotherapy initiation, his condition deteriorated.     On 10/6 RR called for SpO2 in the 60s, where patient was intubated per MOLST form. Peripheral levo was subsequently started but patient unfortunately  soon after while trying to place central line to start central pressors as patient's for pressors support rapidly increased.

## 2024-10-07 LAB — PROCALCITONIN SERPL-MCNC: 9.87 NG/ML — HIGH (ref 0.02–0.1)

## 2024-10-17 DIAGNOSIS — R00.0 TACHYCARDIA, UNSPECIFIED: ICD-10-CM

## 2024-10-17 DIAGNOSIS — E87.70 FLUID OVERLOAD, UNSPECIFIED: ICD-10-CM

## 2024-10-17 DIAGNOSIS — J96.01 ACUTE RESPIRATORY FAILURE WITH HYPOXIA: ICD-10-CM

## 2024-10-17 DIAGNOSIS — I10 ESSENTIAL (PRIMARY) HYPERTENSION: ICD-10-CM

## 2024-10-17 DIAGNOSIS — Z66 DO NOT RESUSCITATE: ICD-10-CM

## 2024-10-17 DIAGNOSIS — C79.51 SECONDARY MALIGNANT NEOPLASM OF BONE: ICD-10-CM

## 2024-10-17 DIAGNOSIS — N17.9 ACUTE KIDNEY FAILURE, UNSPECIFIED: ICD-10-CM

## 2024-10-17 DIAGNOSIS — E78.5 HYPERLIPIDEMIA, UNSPECIFIED: ICD-10-CM

## 2024-10-17 DIAGNOSIS — I26.99 OTHER PULMONARY EMBOLISM WITHOUT ACUTE COR PULMONALE: ICD-10-CM

## 2024-10-17 DIAGNOSIS — C7B.8 OTHER SECONDARY NEUROENDOCRINE TUMORS: ICD-10-CM

## 2024-10-17 DIAGNOSIS — K74.60 UNSPECIFIED CIRRHOSIS OF LIVER: ICD-10-CM

## 2024-10-17 DIAGNOSIS — C78.7 SECONDARY MALIGNANT NEOPLASM OF LIVER AND INTRAHEPATIC BILE DUCT: ICD-10-CM

## 2024-10-17 DIAGNOSIS — C67.9 MALIGNANT NEOPLASM OF BLADDER, UNSPECIFIED: ICD-10-CM

## 2024-10-17 DIAGNOSIS — R65.10 SYSTEMIC INFLAMMATORY RESPONSE SYNDROME (SIRS) OF NON-INFECTIOUS ORIGIN WITHOUT ACUTE ORGAN DYSFUNCTION: ICD-10-CM

## 2024-10-17 DIAGNOSIS — K76.0 FATTY (CHANGE OF) LIVER, NOT ELSEWHERE CLASSIFIED: ICD-10-CM

## 2024-10-17 DIAGNOSIS — D63.0 ANEMIA IN NEOPLASTIC DISEASE: ICD-10-CM

## 2024-10-17 DIAGNOSIS — J90 PLEURAL EFFUSION, NOT ELSEWHERE CLASSIFIED: ICD-10-CM

## 2024-10-17 DIAGNOSIS — B17.9 ACUTE VIRAL HEPATITIS, UNSPECIFIED: ICD-10-CM

## 2024-10-17 DIAGNOSIS — J98.11 ATELECTASIS: ICD-10-CM

## 2024-10-17 DIAGNOSIS — E87.20 ACIDOSIS, UNSPECIFIED: ICD-10-CM

## 2024-10-17 DIAGNOSIS — K72.00 ACUTE AND SUBACUTE HEPATIC FAILURE WITHOUT COMA: ICD-10-CM

## 2024-10-17 DIAGNOSIS — F17.210 NICOTINE DEPENDENCE, CIGARETTES, UNCOMPLICATED: ICD-10-CM

## 2024-10-17 DIAGNOSIS — N39.0 URINARY TRACT INFECTION, SITE NOT SPECIFIED: ICD-10-CM

## 2024-10-17 DIAGNOSIS — K52.9 NONINFECTIVE GASTROENTERITIS AND COLITIS, UNSPECIFIED: ICD-10-CM

## 2024-10-17 DIAGNOSIS — N20.0 CALCULUS OF KIDNEY: ICD-10-CM

## 2024-11-02 LAB
CULTURE RESULTS: SIGNIFICANT CHANGE UP
SPECIMEN SOURCE: SIGNIFICANT CHANGE UP
